# Patient Record
Sex: FEMALE | Race: BLACK OR AFRICAN AMERICAN | NOT HISPANIC OR LATINO | Employment: PART TIME | URBAN - METROPOLITAN AREA
[De-identification: names, ages, dates, MRNs, and addresses within clinical notes are randomized per-mention and may not be internally consistent; named-entity substitution may affect disease eponyms.]

---

## 2017-03-29 ENCOUNTER — OFFICE VISIT (OUTPATIENT)
Dept: MIDWIFE SERVICES | Facility: CLINIC | Age: 32
End: 2017-03-29
Payer: COMMERCIAL

## 2017-03-29 VITALS
BODY MASS INDEX: 27.83 KG/M2 | DIASTOLIC BLOOD PRESSURE: 70 MMHG | OXYGEN SATURATION: 100 % | HEART RATE: 65 BPM | TEMPERATURE: 97.8 F | SYSTOLIC BLOOD PRESSURE: 104 MMHG | HEIGHT: 66 IN | WEIGHT: 173.2 LBS

## 2017-03-29 DIAGNOSIS — Z30.432 ENCOUNTER FOR IUD REMOVAL: ICD-10-CM

## 2017-03-29 DIAGNOSIS — Z00.00 ROUTINE GENERAL MEDICAL EXAMINATION AT A HEALTH CARE FACILITY: Primary | ICD-10-CM

## 2017-03-29 DIAGNOSIS — Z31.69 ENCOUNTER FOR PRECONCEPTION CONSULTATION: ICD-10-CM

## 2017-03-29 PROCEDURE — 58301 REMOVE INTRAUTERINE DEVICE: CPT | Performed by: ADVANCED PRACTICE MIDWIFE

## 2017-03-29 PROCEDURE — 99385 PREV VISIT NEW AGE 18-39: CPT | Mod: 25 | Performed by: ADVANCED PRACTICE MIDWIFE

## 2017-03-29 NOTE — MR AVS SNAPSHOT
"              After Visit Summary   3/29/2017    Mark Cervantes    MRN: [id number]           Patient Information     Date Of Birth          1985        Visit Information        Provider Department      3/29/2017 10:45 AM Jazmin Valiente CNM [facility] [place]        Today's Diagnoses     Routine general medical examination at a health care facility    -  1    Encounter for IUD removal        Encounter for preconception consultation           Follow-ups after your visit        Follow-up notes from your care team     Return if symptoms worsen or fail to improve.      Who to contact     If you have questions or need follow up information about today's clinic visit or your schedule please contact [facility] directly at [phone].  Normal or non-critical lab and imaging results will be communicated to you by Space [employer]pehart, letter or phone within 4 business days after the clinic has received the results. If you do not hear from us within 7 days, please contact the clinic through [employer] or phone. If you have a critical or abnormal lab result, we will notify you by phone as soon as possible.  Submit refill requests through [employer] or call your pharmacy and they will forward the refill request to us. Please allow 3 business days for your refill to be completed.          Additional Information About Your Visit        MyChart Information     EndoEvolution lets you send messages to your doctor, view your test results, renew your prescriptions, schedule appointments and more. To sign up, go to [email or web address] . Click on \"Log in\" on the left side of the screen, which will take you to the Welcome page. Then click on \"Sign up Now\" on the right side of the page.     You will be asked to enter the access code listed below, as well as some personal information. Please follow the directions to create your username and password.     Your access code is: [email or web address]  Expires: 6/27/2017 11:33 AM   " "  Your access code will  in 90 days. If you need help or a new code, please call your [place] clinic or [phone].        Care EveryWhere ID     This is your Care EveryWhere ID. This could be used by other organizations to access your [place] medical records  [id number]        Your Vitals Were     Pulse Temperature Height Last Period Pulse Oximetry Breastfeeding?    65 97.8  F (36.6  C) (Oral) 5' 5.5\" (1.664 m) 2017 100% No    BMI (Body Mass Index)                   28.38 kg/m2            Blood Pressure from Last 3 Encounters:   17 104/70   09/27/15 106/66   07/14/15 120/77    Weight from Last 3 Encounters:   17 173 lb 3.2 oz (78.6 kg)   07/14/15 189 lb (85.7 kg)   14 167 lb 1.6 oz (75.8 kg)              We Performed the Following     REMOVE INTRAUTERINE DEVICE          Today's Medication Changes          These changes are accurate as of: 3/29/17 11:33 AM.  If you have any questions, ask your nurse or doctor.               Stop taking these medicines if you haven't already. Please contact your care team if you have questions.     ibuprofen 400 MG tablet   Commonly known as:  ADVIL/MOTRIN   Stopped by:  Jazmin Valiente CNM           PRENATAL 1 PO   Stopped by:  Jazmin Valiente CNM           VITAMIN D (CHOLECALCIFEROL) PO   Stopped by:  Jazmin Valiente CNM                    Primary Care Provider Office Phone # Fax #    Kathy Ely [phone]       [facility] [address] [facility] [address]  [facility] [address]        Thank you!     Thank you for choosing [facility]  for your care. Our goal is always to provide you with excellent care. Hearing back from our patients is one way we can continue to improve our services. Please take a few minutes to complete the written survey that you may receive in the mail after your visit with us. Thank you!             Your Updated Medication List - Protect others around you: Learn how to " safely use, store and throw away your medicines at www.disposemymeds.org.          This list is accurate as of: 3/29/17 11:33 AM.  Always use your most recent med list.                   Brand Name Dispense Instructions for use    SLOW  (45 FE) MG Tbcr   Generic drug:  ferrous sulfate      Take 142 mg by mouth daily Reported on 3/29/2017
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
## 2017-03-29 NOTE — Clinical Note
Pap smear done on this date: 9/2015 (approximately), by this group: [facility], results were NIL.

## 2017-12-07 ENCOUNTER — HOSPITAL ENCOUNTER (EMERGENCY)
Facility: CLINIC | Age: 32
Discharge: HOME | End: 2017-12-07
Attending: EMERGENCY MEDICINE | Admitting: EMERGENCY MEDICINE
Payer: COMMERCIAL

## 2017-12-07 ENCOUNTER — OFFICE VISIT (OUTPATIENT)
Dept: URGENT CARE | Facility: URGENT CARE | Age: 32
End: 2017-12-07
Payer: COMMERCIAL

## 2017-12-07 VITALS
RESPIRATION RATE: 18 BRPM | OXYGEN SATURATION: 98 % | HEIGHT: 66 IN | HEART RATE: 81 BPM | SYSTOLIC BLOOD PRESSURE: 105 MMHG | TEMPERATURE: 98.7 F | WEIGHT: 192 LBS | DIASTOLIC BLOOD PRESSURE: 68 MMHG | BODY MASS INDEX: 30.86 KG/M2

## 2017-12-07 VITALS
TEMPERATURE: 98.5 F | WEIGHT: 196 LBS | OXYGEN SATURATION: 98 % | HEART RATE: 94 BPM | BODY MASS INDEX: 32.12 KG/M2 | SYSTOLIC BLOOD PRESSURE: 108 MMHG | DIASTOLIC BLOOD PRESSURE: 65 MMHG | RESPIRATION RATE: 16 BRPM

## 2017-12-07 DIAGNOSIS — S05.02XA ABRASION OF LEFT CORNEA, INITIAL ENCOUNTER: ICD-10-CM

## 2017-12-07 DIAGNOSIS — H53.9 VISUAL DISTURBANCE: Primary | ICD-10-CM

## 2017-12-07 PROCEDURE — 99283 EMERGENCY DEPT VISIT LOW MDM: CPT

## 2017-12-07 PROCEDURE — 99214 OFFICE O/P EST MOD 30 MIN: CPT

## 2017-12-07 RX ORDER — PROPARACAINE HYDROCHLORIDE 5 MG/ML
1 SOLUTION/ DROPS OPHTHALMIC ONCE
Status: DISCONTINUED | OUTPATIENT
Start: 2017-12-07 | End: 2017-12-07 | Stop reason: HOSPADM

## 2017-12-07 RX ORDER — TOBRAMYCIN 3 MG/ML
2 SOLUTION/ DROPS OPHTHALMIC 4 TIMES DAILY
Qty: 3 ML | Refills: 0 | Status: SHIPPED | OUTPATIENT
Start: 2017-12-07 | End: 2017-12-14

## 2017-12-07 ASSESSMENT — ENCOUNTER SYMPTOMS
EYE PAIN: 1
PHOTOPHOBIA: 1
FEVER: 0

## 2017-12-07 ASSESSMENT — VISUAL ACUITY
OD: 20/50
OS: 20/70

## 2017-12-07 NOTE — ED AVS SNAPSHOT
Emergency Department    [address] [facility] [address]    Phone:  [phone]    Fax:  [phone]                                       Mark Cervantes   MRN: [id number]    Department:   Emergency Department   Date of Visit:  12/7/2017           Patient Information     Date Of Birth          1985        Your diagnoses for this visit were:     Abrasion of left cornea, initial encounter        You were seen by Sera Pemberton MD.      Follow-up Information     Follow up with Orquidea Varela MD.    Specialty:  Ophthalmology    Why:  As needed, If symptoms worsen    Contact information:    [facility]  [address] [facility]  [address]  [facility] [id number] [phone]          Follow up with  Emergency Department.    Specialty:  EMERGENCY MEDICINE    Why:  As needed, If symptoms worsen    Contact information:    [address] [facility] [id number] [phone]        Discharge Instructions         Contact Lens Injury    Your contact lens can cause injury to the cornea. The cornea is the clear part in the front of the eye. This injury can occur if you sleep with a hard or soft contact lens in place. Or it can happen if you wear a contact lens longer than advised. There is also a greater risk of injury if your eyes dry out too much while wearing a contact lens.  The cornea is very painful when injured, but it usually heals quickly. It usually improves within 24 to 48 hours. Your healthcare provider may apply an eye patch. This is to reduce pain and speed up the healing process. An antibiotic ointment or eye drops may also be used. Healing is complete when the pain stops and there are no other symptoms, such as eye redness, tearing or discharge, or blurred vision.  Home care    Don't wear contacts until you are pain free.    A cold pack may be applied over the eye for 20 minutes at a time to reduce pain. To make a cold pack, put ice cubes in a  plastic bag that seals at the top. Wrap the bag in a clean, thin towel or cloth.        Acetaminophen or ibuprofen can be used for pain, unless another medicine was prescribed. (Note: If you have chronic liver or kidney disease, or have ever had a stomach ulcer or gastrointestinal bleeding, talk with your healthcare provider before using these medicines.)    If an eye patch was applied:    Apply the cold pack directly over the eye patch as described above.    If you were given a follow-up appointment for patch removal and re-exam, don t miss it. An eye patch should not be left in place for more than 48 hours, unless you are advised to do so by your healthcare provider.    Don t drive a motor vehicle or operate machinery with the patch in place. You will have trouble judging distances with only one eye.  Follow-up care  Follow up with your healthcare provider, or as advised.  When to seek medical advice  Call your healthcare provider right away if any of these occur:    Increasing eye pain or pain that doesn't get better after 24 hours    Discharge from the eye    Increasing redness of the eye or swelling of the eyelids    Worsening vision  Date Last Reviewed: 7/1/2017 2000-2017 [employer]. [address] [place] [address]. All rights reserved. This information is not intended as a substitute for professional medical care. Always follow your healthcare professional's instructions.          24 Hour Appointment Hotline       To make an appointment at any [facility], call [phone] ([phone]). If you don't have a family doctor or clinic, we will help you find one. [facility] clinics are conveniently located to serve the needs of you and your family.             Review of your medicines      START taking        Dose / Directions Last dose taken    tobramycin 0.3 % ophthalmic solution   Commonly known as:  TOBREX   Dose:  2 drop   Quantity:  3 mL        Place 2 drops Into the left  eye 4 times daily for 7 days   Refills:  0          Our records show that you are taking the medicines listed below. If these are incorrect, please call your family doctor or clinic.        Dose / Directions Last dose taken    SLOW  (45 FE) MG Tbcr   Dose:  142 mg   Generic drug:  ferrous sulfate        Take 142 mg by mouth daily Reported on 3/29/2017   Refills:  0                Prescriptions were sent or printed at these locations (1 Prescription)                   Other Prescriptions                Printed at Department/Unit printer (1 of 1)         tobramycin (TOBREX) 0.3 % ophthalmic solution                Orders Needing Specimen Collection     None      Pending Results     No orders found from 12/5/2017 to 12/8/2017.            Pending Culture Results     No orders found from 12/5/2017 to 12/8/2017.            Pending Results Instructions     If you had any lab results that were not finalized at the time of your Discharge, you can call the ED Lab Result RN at [phone]. You will be contacted by this team for any positive Lab results or changes in treatment. The nurses are available 7 days a week from 10A to 6:30P.  You can leave a message 24 hours per day and they will return your call.        Test Results From Your Hospital Stay               Clinical Quality Measure: Blood Pressure Screening     Your blood pressure was checked while you were in the emergency department today. The last reading we obtained was  BP: 109/65 . Please read the guidelines below about what these numbers mean and what you should do about them.  If your systolic blood pressure (the top number) is less than 120 and your diastolic blood pressure (the bottom number) is less than 80, then your blood pressure is normal. There is nothing more that you need to do about it.  If your systolic blood pressure (the top number) is 120-139 or your diastolic blood pressure (the bottom number) is 80-89, your blood pressure may be higher  "than it should be. You should have your blood pressure rechecked within a year by a primary care provider.  If your systolic blood pressure (the top number) is 140 or greater or your diastolic blood pressure (the bottom number) is 90 or greater, you may have high blood pressure. High blood pressure is treatable, but if left untreated over time it can put you at risk for heart attack, stroke, or kidney failure. You should have your blood pressure rechecked by a primary care provider within the next 4 weeks.  If your provider in the emergency department today gave you specific instructions to follow-up with your doctor or provider even sooner than that, you should follow that instruction and not wait for up to 4 weeks for your follow-up visit.        Thank you for choosing [facility]       Thank you for choosing [facility] for your care. Our goal is always to provide you with excellent care. Hearing back from our patients is one way we can continue to improve our services. Please take a few minutes to complete the written survey that you may receive in the mail after you visit with us. Thank you!        Remedify Information     Rem[employer]ify lets you send messages to your doctor, view your test results, renew your prescriptions, schedule appointments and more. To sign up, go to [email or web address] . Click on \"Log in\" on the left side of the screen, which will take you to the Welcome page. Then click on \"Sign up Now\" on the right side of the page.     You will be asked to enter the access code listed below, as well as some personal information. Please follow the directions to create your username and password.     Your access code is: [email or web address]  Expires: 3/7/2018  8:10 PM     Your access code will  in 90 days. If you need help or a new code, please call your [facility] clinic or [phone].        Care EveryWhere ID     This is your Care EveryWhere ID. This could be used by other organizations to access your [facility] " medical records  [id number]        Equal Access to Services     PEDRITO CAST : Ignacio Maldonado, lakisha tierney, johnathan balubena. [facility] [phone].    ATENCIÓN: Si habla español, tiene a batista disposición servicios gratuitos de asistencia lingüística. Llame al [phone].    We comply with applicable federal civil rights laws and [place] laws. We do not discriminate on the basis of race, color, national origin, age, disability, sex, sexual orientation, or gender identity.            After Visit Summary       This is your record. Keep this with you and show to your community pharmacist(s) and doctor(s) at your next visit.

## 2017-12-07 NOTE — ED AVS SNAPSHOT
Emergency Department    [address] [place] [address]    Phone:  [phone]    Fax:  [phone]                                       Mark Cervantes   MRN: [id number]    Department:   Emergency Department   Date of Visit:  12/7/2017           After Visit Summary Signature Page     I have received my discharge instructions, and my questions have been answered. I have discussed any challenges I see with this plan with the nurse or doctor.    ..........................................................................................................................................  Patient/Patient Representative Signature      ..........................................................................................................................................  Patient Representative Print Name and Relationship to Patient    ..................................................               ................................................  Date                                            Time    ..........................................................................................................................................  Reviewed by Signature/Title    ...................................................              ..............................................  Date                                                            Time

## 2017-12-08 NOTE — ED PROVIDER NOTES
"  History     Chief Complaint:  Eye Pain    HPI   Mark Cervantes is a 32 year old female who presents with concerns for left eye pain. Today the patient awoke with left eye pain and removed her contact which was worn overnight. Throughout the day her eye has been hurting her and is worsened with movement and bright light. The patient had a prenatal appointment this morning and her eye pain made it difficult to drive. She attempted eye drops and resting though these were ineffective. She was seen at urgent care and referred here to the ED.     Allergies:  No Known Drug Allergies    Medications:    Slow Fe    Past Medical History:    Anemia  Diabetes mellitus  Tuberculosis    Past Surgical History:    Gyn surgery: ovarian cyst    Family History:    Eye surgery    Social History:  Smoking Status: never  Smokeless Tobacco: never  Alcohol Use: no    Marital Status:   [2]    Review of Systems   Constitutional: Negative for fever.   Eyes: Positive for photophobia, pain and visual disturbance.   All other systems reviewed and are negative.    Physical Exam   First Vitals:  BP: 109/65  Pulse: 80  Temp: 98.7  F (37.1  C)  Resp: 20  Height: 167.6 cm (5' 6\")  Weight: 87.1 kg (192 lb)  SpO2: 100 %  Visual Acuity-Left: 20/70  Visual Acuity-Right: 20/50      Physical Exam  General/Appearance: appears stated age, well-groomed, appears uncomfortable  Eyes: EOMI, mild L scleral injection, no icterus, PERRL, constant tearing from L eye, no periorbital edema/erythema/warmth, VA L 20/70 and R 20/50, not wearing contacts currently, 1mm increased fluoroscein uptake at 9 o'clock  ENT: MMM  Neck: supple, nl ROM, no stiffness  Skin: warm and well-perfused, no rash, no edema, no ecchymosis, nl turgor  Neuro: GCS 15, alert and oriented, no gross focal neuro deficits    Emergency Department Course   Interventions:  Fluorescein 1 mg  Emergency Department Course:  Nursing notes and vitals reviewed. I performed an exam of the patient as " documented above.     Medicine administered as documented above.     2035 I rechecked the patient and discussed the results of her workup thus far.     Findings and plan explained to the Patient. Patient discharged home with instructions regarding supportive care, medications, and reasons to return. The importance of close follow-up was reviewed. The patient was prescribed Tobrex.    I personally answered all related questions prior to discharge.     Impression & Plan    Medical Decision Making:  This pt presents for evaluation of eye pain. A broad differential diagnosis was considered including bacterial conjunctivitis, viral conjunctivitis, foreign body, corneal abrasion/ulcer, chemical vs allergic conjunctivitis, HSV, herpes zoster opthalmicus, endopthalmitis, (leila)orbital cellulitis, etc.  Exam is consistent with a corneal abrasion. Remainder of opthalmologic exam is neg for concerning findings such as focal swelling, erythema/warmth, pain with eye movement or light, visualized FB, amada sign, anterior chamber abnormalities. The patient was treated with antibiotics and is to follow-up with Optho for a 24 hour recheck if sxs worsen.  They were instructed to return for visual acuity changes, fever, orbital swelling or any other new and concerning sxs.  Patient is a contact lens wearer so  strict instructions were given to not wear contacts until cleared by optho.  She also is pregnant so Tobramycin gtt were chosen.    Diagnosis:    ICD-10-CM    1. Abrasion of left cornea, initial encounter S05.02XA        Disposition:  discharged to home    Discharge Medications:  Discharge Medication List as of 12/7/2017  8:10 PM      START taking these medications    Details   tobramycin (TOBREX) 0.3 % ophthalmic solution Place 2 drops Into the left eye 4 times daily for 7 days, Disp-3 mL, R-0, Local Print             I, Dangelo Delcid, delroy serving as a scribe on 12/7/2017 at 7:37 PM to personally document services performed by  Sera Pemberton* based on my observations and the provider's statements to me.       Dangelo Declid  12/7/2017    EMERGENCY DEPARTMENT       Sera Pemberton MD  12/07/17 [id number]

## 2017-12-08 NOTE — DISCHARGE INSTRUCTIONS
Contact Lens Injury    Your contact lens can cause injury to the cornea. The cornea is the clear part in the front of the eye. This injury can occur if you sleep with a hard or soft contact lens in place. Or it can happen if you wear a contact lens longer than advised. There is also a greater risk of injury if your eyes dry out too much while wearing a contact lens.  The cornea is very painful when injured, but it usually heals quickly. It usually improves within 24 to 48 hours. Your healthcare provider may apply an eye patch. This is to reduce pain and speed up the healing process. An antibiotic ointment or eye drops may also be used. Healing is complete when the pain stops and there are no other symptoms, such as eye redness, tearing or discharge, or blurred vision.  Home care    Don't wear contacts until you are pain free.    A cold pack may be applied over the eye for 20 minutes at a time to reduce pain. To make a cold pack, put ice cubes in a plastic bag that seals at the top. Wrap the bag in a clean, thin towel or cloth.        Acetaminophen or ibuprofen can be used for pain, unless another medicine was prescribed. (Note: If you have chronic liver or kidney disease, or have ever had a stomach ulcer or gastrointestinal bleeding, talk with your healthcare provider before using these medicines.)    If an eye patch was applied:    Apply the cold pack directly over the eye patch as described above.    If you were given a follow-up appointment for patch removal and re-exam, don t miss it. An eye patch should not be left in place for more than 48 hours, unless you are advised to do so by your healthcare provider.    Don t drive a motor vehicle or operate machinery with the patch in place. You will have trouble judging distances with only one eye.  Follow-up care  Follow up with your healthcare provider, or as advised.  When to seek medical advice  Call your healthcare provider right away if any of these  occur:    Increasing eye pain or pain that doesn't get better after 24 hours    Discharge from the eye    Increasing redness of the eye or swelling of the eyelids    Worsening vision  Date Last Reviewed: 7/1/2017 2000-2017 [employer] [address] [place] [address], [place] [address]. All rights reserved. This information is not intended as a substitute for professional medical care. Always follow your healthcare professional's instructions.

## 2017-12-22 DIAGNOSIS — D50.8 OTHER IRON DEFICIENCY ANEMIA: Primary | ICD-10-CM

## 2017-12-22 PROBLEM — Z3A.28 28 WEEKS GESTATION OF PREGNANCY: Status: ACTIVE | Noted: 2017-12-22

## 2017-12-25 NOTE — PROGRESS NOTES
[facility] Urgent Care Progress Note        Zaira Roche MD, MPH  12-7-2017        History:      Mark Cervantes is a pleasant 32 year old year old female with Irritation and redness, pain and blurry vision of the left eye without whitish/yellowish Drainage. No change in visual accuity. No fever or illness. No cough or shortness of breath. No headache or neck pain. No trauma to the eye. She reports photophobia . No headache is referred. No ocular trauma is referred. No nasal drainage. She wears contact lenses.         Assessment and Plan:      Left ocular pain and visual disturbance:  The patient is directed to [facility] ER for further evaluation. The patient expresses understanding and agrees w this plan.                   Physical Exam:      /65  Pulse 94  Temp 98.5  F (36.9  C)  Resp 16  Wt 196 lb (88.9 kg)  LMP 05/30/2016  SpO2 98%  BMI 32.12 kg/m2     Constitutional: Patient is in no distress The patient is pleasant and cooperative.   HEENT: Head:  Head is atraumatic, normocephalic.    Eyes: Pupils are equal, round and reactive to light and accomodation.  Sclera is non-icteric. No conjunctival injection, or exudate noted. Extraocular motion is intact. Visual acuity is intact bilaterally. Left eye is sensitive to light.  Ears:  External acoustic canals are patent and clear.  There is no erythema and bulging( exudate)  of the ( R/L ) tympanic membrane(s ).   Nose:  No Nasal congestion w/o drainage or mucosal ulceration is noted.  Throat:  Oral mucosa is moist.  No oral lesions are noted.  No posterior pharyngeal hyperemia nor exudate noted.     Neck Supple.  There is no cervical lymphadenopathy.  No nuchal rigidity noted.  There is no meningismus.     Cardiovascular: Heart is regular to rate and rhythm.  No murmur is noted.     Lungs: Clear in the anterior and posterior pulmonary fields.   Abdomen: Soft and non-tender.    Back No flank tenderness is noted.   Extremeties No edema, no calf  tenderness.   Neuro: No focal deficit.   Skin No petechiae or purpura is noted.  There is no rash.   Mood Normal              Data:      All new lab and imaging data was reviewed.   Results for orders placed or performed during the hospital encounter of 09/24/15   CBC with platelets differential   Result Value Ref Range    WBC 15.1 (H) 4.0 - 11.0 10e9/L    RBC Count 4.40 3.8 - 5.2 10e12/L    Hemoglobin 11.7 11.7 - 15.7 g/dL    Hematocrit 35.0 35.0 - 47.0 %    MCV 80 78 - 100 fl    MCH 26.6 26.5 - 33.0 pg    MCHC 33.4 31.5 - 36.5 g/dL    RDW 16.4 (H) 10.0 - 15.0 %    Platelet Count 184 150 - 450 10e9/L    Diff Method Automated Method     % Neutrophils 76.8 %    % Lymphocytes 11.7 %    % Monocytes 10.8 %    % Eosinophils 0.1 %    % Basophils 0.1 %    % Immature Granulocytes 0.5 %    Absolute Neutrophil 11.6 (H) 1.6 - 8.3 10e9/L    Absolute Lymphocytes 1.8 0.8 - 5.3 10e9/L    Absolute Monocytes 1.6 (H) 0.0 - 1.3 10e9/L    Absolute Eosinophils 0.0 0.0 - 0.7 10e9/L    Absolute Basophils 0.0 0.0 - 0.2 10e9/L    Abs Immature Granulocytes 0.1 0 - 0.4 10e9/L   ABO/Rh type and screen   Result Value Ref Range    ABO O     RH(D)  Pos     Antibody Screen Neg     Test Valid Only At [facility]     Specimen Expires 09/28/2015    Group B strep PCR   Result Value Ref Range    Group B Strep PCR positive

## 2018-01-03 ENCOUNTER — INFUSION THERAPY VISIT (OUTPATIENT)
Dept: INFUSION THERAPY | Facility: CLINIC | Age: 33
End: 2018-01-03
Attending: OBSTETRICS & GYNECOLOGY
Payer: COMMERCIAL

## 2018-01-03 VITALS
OXYGEN SATURATION: 96 % | DIASTOLIC BLOOD PRESSURE: 66 MMHG | RESPIRATION RATE: 16 BRPM | SYSTOLIC BLOOD PRESSURE: 112 MMHG | TEMPERATURE: 98.4 F | HEART RATE: 82 BPM

## 2018-01-03 DIAGNOSIS — Z3A.28 28 WEEKS GESTATION OF PREGNANCY: ICD-10-CM

## 2018-01-03 DIAGNOSIS — D50.8 OTHER IRON DEFICIENCY ANEMIA: Primary | ICD-10-CM

## 2018-01-03 PROCEDURE — 25000128 H RX IP 250 OP 636: Performed by: OBSTETRICS & GYNECOLOGY

## 2018-01-03 PROCEDURE — 96365 THER/PROPH/DIAG IV INF INIT: CPT

## 2018-01-03 PROCEDURE — 96366 THER/PROPH/DIAG IV INF ADDON: CPT

## 2018-01-03 RX ADMIN — IRON SUCROSE 300 MG: 20 INJECTION, SOLUTION INTRAVENOUS at 11:29

## 2018-01-03 ASSESSMENT — PAIN SCALES - GENERAL: PAINLEVEL: NO PAIN (0)

## 2018-01-03 NOTE — PROGRESS NOTES
Infusion Nursing Note:  Mark Cervantes presents today for Venofer.    Patient seen by provider today: No   present during visit today: Not Applicable.    Note: N/A.    Intravenous Access:  Peripheral IV placed.    Treatment Conditions:  Not Applicable.      Post Infusion Assessment:  Patient tolerated infusion without incident.  Blood return noted pre and post infusion.  Site patent and intact, free from redness, edema or discomfort.  No evidence of extravasations.  Access discontinued per protocol.    Discharge Plan:   Patient declined prescription refills.  Discharge instructions reviewed with: Patient.  Patient verbalized understanding of discharge instructions and all questions answered.  Copy of AVS reviewed with patient.  Patient will return 1/8/18 for next appointment.  Patient discharged in stable condition accompanied by: self.  Departure Mode: Ambulatory.    Rola Lezama RN

## 2018-01-03 NOTE — MR AVS SNAPSHOT
"              After Visit Summary   1/3/2018    Mark Cervantes    MRN: [id number]           Patient Information     Date Of Birth          1985        Visit Information        Provider Department      1/3/2018 10:30 AM  INFUSION CHAIR 17 [facility]        Today's Diagnoses     Other iron deficiency anemia    -  1    28 weeks gestation of pregnancy           Follow-ups after your visit        Your next 10 appointments already scheduled     Jan 08, 2018 10:30 AM CST   Level 2 with  INFUSION CHAIR 19   [facility] ([facility])    [facility]  [address]  [facility] [id number] [phone]              Who to contact     If you have questions or need follow up information about today's clinic visit or your schedule please contact [facility] directly at [phone].  Normal or non-critical lab and imaging results will be communicated to you by [employer], letter or phone within 4 business days after the clinic has received the results. If you do not hear from us within 7 days, please contact the clinic through [employer] or phone. If you have a critical or abnormal lab result, we will notify you by phone as soon as possible.  Submit refill requests through [employer] or call your pharmacy and they will forward the refill request to us. Please allow 3 business days for your refill to be completed.          Additional Information About Your Visit        MyChart Information     [employer] lets you send messages to your doctor, view your test results, renew your prescriptions, schedule appointments and more. To sign up, go to [email or web address] . Click on \"Log in\" on the left side of the screen, which will take you to the Welcome page. Then click on \"Sign up Now\" on the right side of the page.     You will be asked to enter the access code listed below, as well as some " personal information. Please follow the directions to create your username and password.     Your access code is: [email or web address]  Expires: 3/7/2018  8:10 PM     Your access code will  in 90 days. If you need help or a new code, please call your [facility] or [phone].        Care EveryWhere ID     This is your Care EveryWhere ID. This could be used by other organizations to access your [place] medical records  [id number]        Your Vitals Were     Pulse Temperature Respirations Last Period Pulse Oximetry       82 98.4  F (36.9  C) (Oral) 16 2016 96%        Blood Pressure from Last 3 Encounters:   18 112/66   17 105/68   17 108/65    Weight from Last 3 Encounters:   17 87.1 kg (192 lb)   17 88.9 kg (196 lb)   17 78.6 kg (173 lb 3.2 oz)              Today, you had the following     No orders found for display       Primary Care Provider Office Phone # Fax #    Kathy Ely [phone]       [facility] [address]  [place] [address]        Equal Access to Services     MAY [facility] HospitalALESSANDRA : Hadii aad ku hadasho Soomaali, waaxda luqadaha, qaybta kaalmada adeegyada, johnathan white . So [facility] [phone].    ATENCIÓN: Si habla español, tiene a batista disposición servicios gratuitos de asistencia lingüística. Llame al [phone].    We comply with applicable federal civil rights laws and [place] laws. We do not discriminate on the basis of race, color, national origin, age, disability, sex, sexual orientation, or gender identity.            Thank you!     Thank you for choosing [facility]  for your care. Our goal is always to provide you with excellent care. Hearing back from our patients is one way we can continue to improve our services. Please take a few minutes to complete the written survey that you may receive in the mail after your visit with us. Thank you!              Your Updated Medication List - Protect others around you: Learn how to safely use, store and throw away your medicines at www.disposemymeds.org.          This list is accurate as of: 1/3/18  1:16 PM.  Always use your most recent med list.                   Brand Name Dispense Instructions for use Diagnosis    SLOW  (45 FE) MG Tbcr   Generic drug:  ferrous sulfate      Take 142 mg by mouth daily Reported on 3/29/2017

## 2018-01-08 ENCOUNTER — INFUSION THERAPY VISIT (OUTPATIENT)
Dept: INFUSION THERAPY | Facility: CLINIC | Age: 33
End: 2018-01-08
Attending: OBSTETRICS & GYNECOLOGY
Payer: COMMERCIAL

## 2018-01-08 VITALS
RESPIRATION RATE: 16 BRPM | DIASTOLIC BLOOD PRESSURE: 62 MMHG | SYSTOLIC BLOOD PRESSURE: 105 MMHG | HEART RATE: 80 BPM | TEMPERATURE: 98.6 F

## 2018-01-08 DIAGNOSIS — D50.8 OTHER IRON DEFICIENCY ANEMIA: Primary | ICD-10-CM

## 2018-01-08 DIAGNOSIS — Z3A.28 28 WEEKS GESTATION OF PREGNANCY: ICD-10-CM

## 2018-01-08 PROCEDURE — 96365 THER/PROPH/DIAG IV INF INIT: CPT

## 2018-01-08 PROCEDURE — 96366 THER/PROPH/DIAG IV INF ADDON: CPT

## 2018-01-08 PROCEDURE — 25000128 H RX IP 250 OP 636: Performed by: OBSTETRICS & GYNECOLOGY

## 2018-01-08 RX ADMIN — IRON SUCROSE 300 MG: 20 INJECTION, SOLUTION INTRAVENOUS at 12:58

## 2018-01-08 RX ADMIN — SODIUM CHLORIDE 250 ML: 9 INJECTION, SOLUTION INTRAVENOUS at 12:58

## 2018-01-08 ASSESSMENT — PAIN SCALES - GENERAL: PAINLEVEL: NO PAIN (0)

## 2018-01-08 NOTE — PROGRESS NOTES
Infusion Nursing Note:  Mark Cervantes presents today for Venofer.    Patient seen by provider today: Yes: Dr. Navarro   present during visit today: Not Applicable.    Note: No concerns or changes to report. 33 weeks pregnant..    Intravenous Access:  Peripheral IV placed.    Treatment Conditions:  Not Applicable.      Post Infusion Assessment:  Patient tolerated infusion without incident.  Blood return noted pre and post infusion.  Site patent and intact, free from redness, edema or discomfort.  No evidence of extravasations.  Access discontinued per protocol.    Discharge Plan:   Discharge instructions reviewed with: Patient.  Patient and/or family verbalized understanding of discharge instructions and all questions answered.  Copy of AVS reviewed with patient and/or family.  Patient will return: None needed  Patient discharged in stable condition accompanied by: self.  Departure Mode: Ambulatory.    Alaina Whitmore RN

## 2018-01-08 NOTE — MR AVS SNAPSHOT
"              After Visit Summary   2018    Mark Cervantes    MRN: [id number]           Patient Information     Date Of Birth          1985        Visit Information        Provider Department      2018 10:30 AM  INFUSION CHAIR 19 [facility]        Today's Diagnoses     Other iron deficiency anemia    -  1    28 weeks gestation of pregnancy           Follow-ups after your visit        Who to contact     If you have questions or need follow up information about today's clinic visit or your schedule please contact [facility] directly at [phone].  Normal or non-critical lab and imaging results will be communicated to you by [employer], letter or phone within 4 business days after the clinic has received the results. If you do not hear from us within 7 days, please contact the clinic through [employer] or phone. If you have a critical or abnormal lab result, we will notify you by phone as soon as possible.  Submit refill requests through [employer] or call your pharmacy and they will forward the refill request to us. Please allow 3 business days for your refill to be completed.          Additional Information About Your Visit        MyChart Information     [employer] lets you send messages to your doctor, view your test results, renew your prescriptions, schedule appointments and more. To sign up, go to [email or web address] . Click on \"Log in\" on the left side of the screen, which will take you to the Welcome page. Then click on \"Sign up Now\" on the right side of the page.     You will be asked to enter the access code listed below, as well as some personal information. Please follow the directions to create your username and password.     Your access code is: [email or web address]  Expires: 3/7/2018  8:10 PM     Your access code will  in 90 days. If you need help or a new code, please call your [place] clinic or [phone].        Care " EveryWhere ID     This is your Care EveryWhere ID. This could be used by other organizations to access your [place] medical records  [id number]        Your Vitals Were     Pulse Temperature Respirations Last Period          80 98.6  F (37  C) (Oral) 16 05/30/2016         Blood Pressure from Last 3 Encounters:   01/08/18 105/62   01/03/18 112/66   12/07/17 105/68    Weight from Last 3 Encounters:   12/07/17 87.1 kg (192 lb)   12/07/17 88.9 kg (196 lb)   03/29/17 78.6 kg (173 lb 3.2 oz)              Today, you had the following     No orders found for display         Today's Medication Changes          These changes are accurate as of: 1/8/18  2:37 PM.  If you have any questions, ask your nurse or doctor.               Stop taking these medicines if you haven't already. Please contact your care team if you have questions.     SLOW  (45 FE) MG Tbcr   Generic drug:  ferrous sulfate                    Primary Care Provider Office Phone # Fax #    Kathy Ely [phone]       [facility] [address]  [place] [address]        Equal Access to Services     MAY CAST AH: Hadii latricia Maldonado, waaxda niall, qaybta kaalmada kenya, johnathan winchester. So [facility] [phone].    ATENCIÓN: Si habla español, tiene a batista disposición servicios gratuitos de asistencia lingüística. Ll[facility]ville Hospital [phone].    We comply with applicable federal civil rights laws and [place] laws. We do not discriminate on the basis of race, color, national origin, age, disability, sex, sexual orientation, or gender identity.            Thank you!     Thank you for choosing [facility]  for your care. Our goal is always to provide you with excellent care. Hearing back from our patients is one way we can continue to improve our services. Please take a few minutes to complete the written survey that you may receive in the mail after your  visit with us. Thank you!             Your Updated Medication List - Protect others around you: Learn how to safely use, store and throw away your medicines at www.disposemymeds.org.          This list is accurate as of: 1/8/18  2:37 PM.  Always use your most recent med list.                   Brand Name Dispense Instructions for use Diagnosis    ONE-A-DAY WOMENS PRENATAL 1 PO      Take by mouth daily        VITRON-C PO      Take by mouth daily

## 2018-01-26 ENCOUNTER — TELEPHONE (OUTPATIENT)
Dept: INFUSION THERAPY | Facility: CLINIC | Age: 33
End: 2018-01-26

## 2018-01-30 DIAGNOSIS — D50.8 OTHER IRON DEFICIENCY ANEMIA: Primary | ICD-10-CM

## 2018-01-31 ENCOUNTER — INFUSION THERAPY VISIT (OUTPATIENT)
Dept: INFUSION THERAPY | Facility: CLINIC | Age: 33
End: 2018-01-31
Attending: OBSTETRICS & GYNECOLOGY
Payer: COMMERCIAL

## 2018-01-31 VITALS
DIASTOLIC BLOOD PRESSURE: 66 MMHG | OXYGEN SATURATION: 99 % | RESPIRATION RATE: 16 BRPM | SYSTOLIC BLOOD PRESSURE: 110 MMHG | HEART RATE: 75 BPM | TEMPERATURE: 98.1 F

## 2018-01-31 DIAGNOSIS — Z3A.28 28 WEEKS GESTATION OF PREGNANCY: ICD-10-CM

## 2018-01-31 DIAGNOSIS — D50.8 OTHER IRON DEFICIENCY ANEMIA: Primary | ICD-10-CM

## 2018-01-31 PROCEDURE — 96366 THER/PROPH/DIAG IV INF ADDON: CPT

## 2018-01-31 PROCEDURE — 25000128 H RX IP 250 OP 636: Performed by: OBSTETRICS & GYNECOLOGY

## 2018-01-31 PROCEDURE — 96365 THER/PROPH/DIAG IV INF INIT: CPT

## 2018-01-31 RX ADMIN — IRON SUCROSE 300 MG: 20 INJECTION, SOLUTION INTRAVENOUS at 10:56

## 2018-01-31 ASSESSMENT — PAIN SCALES - GENERAL: PAINLEVEL: NO PAIN (0)

## 2018-01-31 NOTE — PROGRESS NOTES
Infusion Nursing Note:  Mark Cervantes presents today for Venofer.    Patient seen by provider today: No   present during visit today: Not Applicable.    Note: 37 weeks pregnant. No concerns to report..    Intravenous Access:  Peripheral IV placed.    Treatment Conditions:  Not Applicable.      Post Infusion Assessment:  Patient tolerated infusion without incident.  Blood return noted pre and post infusion.  Site patent and intact, free from redness, edema or discomfort.  No evidence of extravasations.  Access discontinued per protocol.    Discharge Plan:   Discharge instructions reviewed with: Patient.  Patient and/or family verbalized understanding of discharge instructions and all questions answered.  Copy of AVS reviewed with patient and/or family.  Patient will return only as needed for next appointment.  Patient discharged in stable condition accompanied by: self.  Departure Mode: Ambulatory.    Alaina Whitmore RN

## 2018-01-31 NOTE — MR AVS SNAPSHOT
"              After Visit Summary   2018    Mark Cervantes    MRN: [id number]           Patient Information     Date Of Birth          1985        Visit Information        Provider Department      2018 10:30 AM  INFUSION CHAIR [facility]        Today's Diagnoses     Other iron deficiency anemia    -  1    28 weeks gestation of pregnancy           Follow-ups after your visit        Who to contact     If you have questions or need follow up information about today's clinic visit or your schedule please contact [facility] directly at [phone].  Normal or non-critical lab and imaging results will be communicated to you by Colabohart, letter or phone within 4 business days after the clinic has received the results. If you do not hear from us within 7 days, please contact the clinic through NantMobilet or phone. If you have a critical or abnormal lab result, we will notify you by phone as soon as possible.  Submit refill requests through [employer] or call your pharmacy and they will forward the refill request to us. Please allow 3 business days for your refill to be completed.          Additional Information About Your Visit        MyChart Information     [employer] lets you send messages to your doctor, view your test results, renew your prescriptions, schedule appointments and more. To sign up, go to [email or web address] . Click on \"Log in\" on the left side of the screen, which will take you to the Welcome page. Then click on \"Sign up Now\" on the right side of the page.     You will be asked to enter the access code listed below, as well as some personal information. Please follow the directions to create your username and password.     Your access code is: [email or web address]  Expires: 3/7/2018  8:10 PM     Your access code will  in 90 days. If you need help or a new code, please call your [facility] clinic or [phone].        Care " EveryWhere ID     This is your Care EveryWhere ID. This could be used by other organizations to access your [facility] medical records  [id number]        Your Vitals Were     Pulse Temperature Respirations Last Period Pulse Oximetry       75 98.1  F (36.7  C) 16 03/27/2017 99%        Blood Pressure from Last 3 Encounters:   01/31/18 110/66   01/08/18 105/62   01/03/18 112/66    Weight from Last 3 Encounters:   12/07/17 87.1 kg (192 lb)   12/07/17 88.9 kg (196 lb)   03/29/17 78.6 kg (173 lb 3.2 oz)              Today, you had the following     No orders found for display       Primary Care Provider Office Phone # Fax #    Kathy Ely [phone]       [facility] [address]  [place] [address]        Equal Access to Services     [facility]: Hadii aad ku hadasho Solauriali, waaxda luqadaha, qaybta kaalmada adeegyada, waxay edmundoin haybashirn salvatore white . So [facility] [phone].    ATENCIÓN: Si habla español, tiene a batista disposición servicios gratuitos de asistencia lingüística. Eric al [phone].    We comply with applicable federal civil rights laws and [place] laws. We do not discriminate on the basis of race, color, national origin, age, disability, sex, sexual orientation, or gender identity.            Thank you!     Thank you for choosing [facility]  for your care. Our goal is always to provide you with excellent care. Hearing back from our patients is one way we can continue to improve our services. Please take a few minutes to complete the written survey that you may receive in the mail after your visit with us. Thank you!             Your Updated Medication List - Protect others around you: Learn how to safely use, store and throw away your medicines at www.disposemymeds.org.          This list is accurate as of 1/31/18 12:30 PM.  Always use your most recent med list.                   Brand Name Dispense Instructions for use  Diagnosis    ONE-A-DAY WOMENS PRENATAL 1 PO      Take by mouth daily        VITRON-C PO      Take by mouth daily

## 2018-02-01 LAB — GROUP B STREP PCR: POSITIVE

## 2018-03-04 ENCOUNTER — HOSPITAL ENCOUNTER (OUTPATIENT)
Facility: CLINIC | Age: 33
Discharge: HOME | End: 2018-03-04
Attending: OBSTETRICS & GYNECOLOGY | Admitting: OBSTETRICS & GYNECOLOGY
Payer: COMMERCIAL

## 2018-03-04 VITALS — TEMPERATURE: 98.4 F | SYSTOLIC BLOOD PRESSURE: 134 MMHG | DIASTOLIC BLOOD PRESSURE: 85 MMHG

## 2018-03-04 PROCEDURE — 59025 FETAL NON-STRESS TEST: CPT

## 2018-03-04 RX ORDER — ONDANSETRON 2 MG/ML
4 INJECTION INTRAMUSCULAR; INTRAVENOUS EVERY 6 HOURS PRN
Status: DISCONTINUED | OUTPATIENT
Start: 2018-03-04 | End: 2018-03-04 | Stop reason: HOSPADM

## 2018-03-04 NOTE — IP AVS SNAPSHOT
[address], [address]    [facility] [address]    Phone:  [phone]                                       After Visit Summary   3/4/2018    Mark Cervantes    MRN: [id number]           After Visit Summary Signature Page     I have received my discharge instructions, and my questions have been answered. I have discussed any challenges I see with this plan with the nurse or doctor.    ..........................................................................................................................................  Patient/Patient Representative Signature      ..........................................................................................................................................  Patient Representative Print Name and Relationship to Patient    ..................................................               ................................................  Date                                            Time    ..........................................................................................................................................  Reviewed by Signature/Title    ...................................................              ..............................................  Date                                                            Time

## 2018-03-04 NOTE — IP AVS SNAPSHOT
MRN:[id number]                      After Visit Summary   3/4/2018    Mark Cervantes    MRN: [id number]           Thank you!     Thank you for choosing [facility] for your care. Our goal is always to provide you with excellent care. Hearing back from our patients is one way we can continue to improve our services. Please take a few minutes to complete the written survey that you may receive in the mail after you visit with us. Thank you!        Patient Information     Date Of Birth          1985        About your hospital stay     You were admitted on:  2018 You last received care in the:  [facility]    You were discharged on:  2018       Who to Call     For medical emergencies, please call 911.  For non-urgent questions about your medical care, please call your primary care provider or clinic, [phone]          Attending Provider     Provider Specialty    Jie Navarro MD OB/Gyn       Primary Care Provider Office Phone # Fax #    Jie Navarro [phone]      Further instructions from your care team       Data: [facility]   Reason for maternal/fetal assessment per patient is favorable cervix for induction. Patient is a . Prenatal record reviewed.      Gestational Age 41w0d. VSS. Cervix: posterior.  Fetal movement present. Patient denies cramping, backache, vaginal discharge, pelvic pressure, UTI symptoms, GI problems, bloody show, vaginal bleeding, edema, headache, visual disturbances, epigastric or URQ pain, abdominal pain, rupture of membranes. Support persons  present.  Action: Verbal consent for EFM. Triage assessment completed. EFM applied for reactive NST. Uterine assessment no ctx. Fetal assessment: Presumed adequate fetal oxygenation documented (see flow record). Patient education pamphlets given on fetal movement counts . Patient instructed to report change in fetal movement, vaginal leaking  "of fluid or bleeding, abdominal pain, or any concerns related to the pregnancy to her nurse/physician.   Response: Dr. Valenzuela informed of SVE. Plan per provider is return in AM for scheduled induction with Dr Sullivan. Patient verbalized understanding of education and verbalized agreement with plan. Discharged ambulatory at 1910.    Face to face time: 0-15 minutes      Pending Results     No orders found from 3/2/2018 to 3/5/2018.            Admission Information     Date & Time Provider Department Dept. Phone    3/4/2018 Jie Navarro MD [facility] [phone]      Your Vitals Were     Blood Pressure Temperature Last Period             134/85 98.4  F (36.9  C) 2017         [facility] Information     [employer] lets you send messages to your doctor, view your test results, renew your prescriptions, schedule appointments and more. To sign up, go to [email or web address] . Click on \"Log in\" on the left side of the screen, which will take you to the Welcome page. Then click on \"Sign up Now\" on the right side of the page.     You will be asked to enter the access code listed below, as well as some personal information. Please follow the directions to create your username and password.     Your access code is: [email or web address]  Expires: 3/7/2018  8:10 PM     Your access code will  in 90 days. If you need help or a new code, please call your [facility] clinic or [phone].        Care EveryWhere ID     This is your Care EveryWhere ID. This could be used by other organizations to access your [facility] medical records  [id number]        Equal Access to Services     [facility]: Hadii latricia smith Sodeborah, waaxda luqadaha, qaybta kaaljohnathan shultz. So [facility] [phone].    ATENCIÓN: Si habla español, tiene a batista disposición servicios gratuitos de asistencia lingüística. Llame al [phone].    We comply with applicable federal civil rights laws and [place] " laws. We do not discriminate on the basis of race, color, national origin, age, disability, sex, sexual orientation, or gender identity.               Review of your medicines      UNREVIEWED medicines. Ask your doctor about these medicines        Dose / Directions    ONE-A-DAY WOMENS PRENATAL 1 PO        Take by mouth daily   Refills:  0       VITRON-C PO        Take by mouth daily   Refills:  0                Protect others around you: Learn how to safely use, store and throw away your medicines at www.disposemymeds.org.             Medication List: This is a list of all your medications and when to take them. Check marks below indicate your daily home schedule. Keep this list as a reference.      Medications           Morning Afternoon Evening Bedtime As Needed    ONE-A-DAY WOMENS PRENATAL 1 PO   Take by mouth daily                                VITRON-C PO   Take by mouth daily

## 2018-03-05 ENCOUNTER — ANESTHESIA EVENT (OUTPATIENT)
Dept: OBGYN | Facility: CLINIC | Age: 33
End: 2018-03-05
Payer: COMMERCIAL

## 2018-03-05 ENCOUNTER — HOSPITAL ENCOUNTER (INPATIENT)
Facility: CLINIC | Age: 33
LOS: 3 days | Discharge: HOME HEALTH | End: 2018-03-08
Attending: OBSTETRICS & GYNECOLOGY | Admitting: OBSTETRICS & GYNECOLOGY
Payer: COMMERCIAL

## 2018-03-05 ENCOUNTER — ANESTHESIA (OUTPATIENT)
Dept: OBGYN | Facility: CLINIC | Age: 33
End: 2018-03-05
Payer: COMMERCIAL

## 2018-03-05 PROBLEM — Z34.90 PREGNANCY: Status: ACTIVE | Noted: 2018-03-05

## 2018-03-05 LAB
ABO + RH BLD: NORMAL
ABO + RH BLD: NORMAL
ALT SERPL W P-5'-P-CCNC: 15 U/L (ref 0–50)
AST SERPL W P-5'-P-CCNC: 20 U/L (ref 0–45)
BLD GP AB SCN SERPL QL: NORMAL
BLOOD BANK CMNT PATIENT-IMP: NORMAL
BUN SERPL-MCNC: 9 MG/DL (ref 7–30)
CREAT SERPL-MCNC: 0.51 MG/DL (ref 0.52–1.04)
ERYTHROCYTE [DISTWIDTH] IN BLOOD BY AUTOMATED COUNT: 19.9 % (ref 10–15)
GFR SERPL CREATININE-BSD FRML MDRD: >90 ML/MIN/1.7M2
GLUCOSE BLDC GLUCOMTR-MCNC: 118 MG/DL (ref 70–99)
GLUCOSE BLDC GLUCOMTR-MCNC: 75 MG/DL (ref 70–99)
GLUCOSE BLDC GLUCOMTR-MCNC: 77 MG/DL (ref 70–99)
GLUCOSE BLDC GLUCOMTR-MCNC: 90 MG/DL (ref 70–99)
GLUCOSE SERPL-MCNC: 90 MG/DL (ref 70–99)
HCT VFR BLD AUTO: 35 % (ref 35–47)
HGB BLD-MCNC: 11.4 G/DL (ref 11.7–15.7)
HGB BLD-MCNC: NORMAL G/DL (ref 11.7–15.7)
MCH RBC QN AUTO: 25.6 PG (ref 26.5–33)
MCHC RBC AUTO-ENTMCNC: 32.6 G/DL (ref 31.5–36.5)
MCV RBC AUTO: 79 FL (ref 78–100)
PLATELET # BLD AUTO: 172 10E9/L (ref 150–450)
RBC # BLD AUTO: 4.46 10E12/L (ref 3.8–5.2)
SPECIMEN EXP DATE BLD: NORMAL
T PALLIDUM IGG+IGM SER QL: NEGATIVE
URATE SERPL-MCNC: 5.9 MG/DL (ref 2.6–6)
WBC # BLD AUTO: 8.6 10E9/L (ref 4–11)

## 2018-03-05 PROCEDURE — 82565 ASSAY OF CREATININE: CPT | Performed by: OBSTETRICS & GYNECOLOGY

## 2018-03-05 PROCEDURE — 86850 RBC ANTIBODY SCREEN: CPT | Performed by: OBSTETRICS & GYNECOLOGY

## 2018-03-05 PROCEDURE — 27110038 ZZH RX 271: Performed by: ANESTHESIOLOGY

## 2018-03-05 PROCEDURE — 10907ZC DRAINAGE OF AMNIOTIC FLUID, THERAPEUTIC FROM PRODUCTS OF CONCEPTION, VIA NATURAL OR ARTIFICIAL OPENING: ICD-10-PCS | Performed by: OBSTETRICS & GYNECOLOGY

## 2018-03-05 PROCEDURE — 3E033VJ INTRODUCTION OF OTHER HORMONE INTO PERIPHERAL VEIN, PERCUTANEOUS APPROACH: ICD-10-PCS | Performed by: OBSTETRICS & GYNECOLOGY

## 2018-03-05 PROCEDURE — 72200001 ZZH LABOR CARE VAGINAL DELIVERY SINGLE

## 2018-03-05 PROCEDURE — 84520 ASSAY OF UREA NITROGEN: CPT | Performed by: OBSTETRICS & GYNECOLOGY

## 2018-03-05 PROCEDURE — 25000132 ZZH RX MED GY IP 250 OP 250 PS 637: Performed by: OBSTETRICS & GYNECOLOGY

## 2018-03-05 PROCEDURE — 85027 COMPLETE CBC AUTOMATED: CPT | Performed by: OBSTETRICS & GYNECOLOGY

## 2018-03-05 PROCEDURE — 25000125 ZZHC RX 250: Performed by: OBSTETRICS & GYNECOLOGY

## 2018-03-05 PROCEDURE — 25000125 ZZHC RX 250: Performed by: ANESTHESIOLOGY

## 2018-03-05 PROCEDURE — 86780 TREPONEMA PALLIDUM: CPT | Performed by: OBSTETRICS & GYNECOLOGY

## 2018-03-05 PROCEDURE — 36415 COLL VENOUS BLD VENIPUNCTURE: CPT | Performed by: OBSTETRICS & GYNECOLOGY

## 2018-03-05 PROCEDURE — 84450 TRANSFERASE (AST) (SGOT): CPT | Performed by: OBSTETRICS & GYNECOLOGY

## 2018-03-05 PROCEDURE — 82947 ASSAY GLUCOSE BLOOD QUANT: CPT | Performed by: OBSTETRICS & GYNECOLOGY

## 2018-03-05 PROCEDURE — 25000128 H RX IP 250 OP 636: Performed by: OBSTETRICS & GYNECOLOGY

## 2018-03-05 PROCEDURE — 86901 BLOOD TYPING SEROLOGIC RH(D): CPT | Performed by: OBSTETRICS & GYNECOLOGY

## 2018-03-05 PROCEDURE — 3E0R3BZ INTRODUCTION OF ANESTHETIC AGENT INTO SPINAL CANAL, PERCUTANEOUS APPROACH: ICD-10-PCS | Performed by: ANESTHESIOLOGY

## 2018-03-05 PROCEDURE — 86900 BLOOD TYPING SEROLOGIC ABO: CPT | Performed by: OBSTETRICS & GYNECOLOGY

## 2018-03-05 PROCEDURE — 12000037 ZZH R&B POSTPARTUM INTERMEDIATE

## 2018-03-05 PROCEDURE — 84460 ALANINE AMINO (ALT) (SGPT): CPT | Performed by: OBSTETRICS & GYNECOLOGY

## 2018-03-05 PROCEDURE — 37000011 ZZH ANESTHESIA WARD SERVICE

## 2018-03-05 PROCEDURE — 0HQ9XZZ REPAIR PERINEUM SKIN, EXTERNAL APPROACH: ICD-10-PCS | Performed by: OBSTETRICS & GYNECOLOGY

## 2018-03-05 PROCEDURE — 25000128 H RX IP 250 OP 636: Performed by: ANESTHESIOLOGY

## 2018-03-05 PROCEDURE — 00000146 ZZHCL STATISTIC GLUCOSE BY METER IP

## 2018-03-05 PROCEDURE — 84550 ASSAY OF BLOOD/URIC ACID: CPT | Performed by: OBSTETRICS & GYNECOLOGY

## 2018-03-05 PROCEDURE — 00HU33Z INSERTION OF INFUSION DEVICE INTO SPINAL CANAL, PERCUTANEOUS APPROACH: ICD-10-PCS | Performed by: ANESTHESIOLOGY

## 2018-03-05 RX ORDER — NICOTINE POLACRILEX 4 MG
15-30 LOZENGE BUCCAL
Status: DISCONTINUED | OUTPATIENT
Start: 2018-03-05 | End: 2018-03-05

## 2018-03-05 RX ORDER — OXYTOCIN/0.9 % SODIUM CHLORIDE 30/500 ML
340 PLASTIC BAG, INJECTION (ML) INTRAVENOUS CONTINUOUS PRN
Status: DISCONTINUED | OUTPATIENT
Start: 2018-03-05 | End: 2018-03-08 | Stop reason: HOSPADM

## 2018-03-05 RX ORDER — FENTANYL CITRATE 50 UG/ML
50-100 INJECTION, SOLUTION INTRAMUSCULAR; INTRAVENOUS
Status: DISCONTINUED | OUTPATIENT
Start: 2018-03-05 | End: 2018-03-05

## 2018-03-05 RX ORDER — OXYTOCIN 10 [USP'U]/ML
10 INJECTION, SOLUTION INTRAMUSCULAR; INTRAVENOUS
Status: DISCONTINUED | OUTPATIENT
Start: 2018-03-05 | End: 2018-03-05

## 2018-03-05 RX ORDER — OXYTOCIN/0.9 % SODIUM CHLORIDE 30/500 ML
1-24 PLASTIC BAG, INJECTION (ML) INTRAVENOUS CONTINUOUS
Status: DISCONTINUED | OUTPATIENT
Start: 2018-03-05 | End: 2018-03-05

## 2018-03-05 RX ORDER — BISACODYL 10 MG
10 SUPPOSITORY, RECTAL RECTAL DAILY PRN
Status: DISCONTINUED | OUTPATIENT
Start: 2018-03-07 | End: 2018-03-08 | Stop reason: HOSPADM

## 2018-03-05 RX ORDER — SODIUM CHLORIDE 9 MG/ML
INJECTION, SOLUTION INTRAVENOUS CONTINUOUS
Status: DISCONTINUED | OUTPATIENT
Start: 2018-03-05 | End: 2018-03-05

## 2018-03-05 RX ORDER — PENICILLIN G POTASSIUM 5000000 [IU]/1
5 INJECTION, POWDER, FOR SOLUTION INTRAMUSCULAR; INTRAVENOUS ONCE
Status: COMPLETED | OUTPATIENT
Start: 2018-03-05 | End: 2018-03-05

## 2018-03-05 RX ORDER — ACETAMINOPHEN 325 MG/1
650 TABLET ORAL EVERY 4 HOURS PRN
Status: DISCONTINUED | OUTPATIENT
Start: 2018-03-05 | End: 2018-03-08 | Stop reason: HOSPADM

## 2018-03-05 RX ORDER — OXYCODONE AND ACETAMINOPHEN 5; 325 MG/1; MG/1
1 TABLET ORAL
Status: DISCONTINUED | OUTPATIENT
Start: 2018-03-05 | End: 2018-03-05

## 2018-03-05 RX ORDER — ACETAMINOPHEN 325 MG/1
650 TABLET ORAL EVERY 4 HOURS PRN
Status: DISCONTINUED | OUTPATIENT
Start: 2018-03-05 | End: 2018-03-05

## 2018-03-05 RX ORDER — MISOPROSTOL 200 UG/1
400 TABLET ORAL
Status: DISCONTINUED | OUTPATIENT
Start: 2018-03-05 | End: 2018-03-08 | Stop reason: HOSPADM

## 2018-03-05 RX ORDER — LIDOCAINE HYDROCHLORIDE AND EPINEPHRINE 15; 5 MG/ML; UG/ML
INJECTION, SOLUTION EPIDURAL PRN
Status: DISCONTINUED | OUTPATIENT
Start: 2018-03-05 | End: 2018-03-06 | Stop reason: HOSPADM

## 2018-03-05 RX ORDER — OXYTOCIN/0.9 % SODIUM CHLORIDE 30/500 ML
100-340 PLASTIC BAG, INJECTION (ML) INTRAVENOUS CONTINUOUS PRN
Status: COMPLETED | OUTPATIENT
Start: 2018-03-05 | End: 2018-03-05

## 2018-03-05 RX ORDER — OXYTOCIN 10 [USP'U]/ML
10 INJECTION, SOLUTION INTRAMUSCULAR; INTRAVENOUS
Status: DISCONTINUED | OUTPATIENT
Start: 2018-03-05 | End: 2018-03-08 | Stop reason: HOSPADM

## 2018-03-05 RX ORDER — DEXTROSE MONOHYDRATE 25 G/50ML
25-50 INJECTION, SOLUTION INTRAVENOUS
Status: DISCONTINUED | OUTPATIENT
Start: 2018-03-05 | End: 2018-03-08 | Stop reason: HOSPADM

## 2018-03-05 RX ORDER — ROPIVACAINE HYDROCHLORIDE 2 MG/ML
10 INJECTION, SOLUTION EPIDURAL; INFILTRATION; PERINEURAL ONCE
Status: COMPLETED | OUTPATIENT
Start: 2018-03-05 | End: 2018-03-05

## 2018-03-05 RX ORDER — DEXTROSE, SODIUM CHLORIDE, SODIUM LACTATE, POTASSIUM CHLORIDE, AND CALCIUM CHLORIDE 5; .6; .31; .03; .02 G/100ML; G/100ML; G/100ML; G/100ML; G/100ML
INJECTION, SOLUTION INTRAVENOUS CONTINUOUS
Status: DISCONTINUED | OUTPATIENT
Start: 2018-03-05 | End: 2018-03-05

## 2018-03-05 RX ORDER — SODIUM CHLORIDE, SODIUM LACTATE, POTASSIUM CHLORIDE, CALCIUM CHLORIDE 600; 310; 30; 20 MG/100ML; MG/100ML; MG/100ML; MG/100ML
INJECTION, SOLUTION INTRAVENOUS CONTINUOUS
Status: DISCONTINUED | OUTPATIENT
Start: 2018-03-05 | End: 2018-03-05

## 2018-03-05 RX ORDER — HYDROCORTISONE 2.5 %
CREAM (GRAM) TOPICAL 3 TIMES DAILY PRN
Status: DISCONTINUED | OUTPATIENT
Start: 2018-03-05 | End: 2018-03-08 | Stop reason: HOSPADM

## 2018-03-05 RX ORDER — DEXTROSE MONOHYDRATE 25 G/50ML
25-50 INJECTION, SOLUTION INTRAVENOUS
Status: DISCONTINUED | OUTPATIENT
Start: 2018-03-05 | End: 2018-03-05

## 2018-03-05 RX ORDER — ROPIVACAINE HYDROCHLORIDE 2 MG/ML
INJECTION, SOLUTION EPIDURAL; INFILTRATION; PERINEURAL
Status: COMPLETED
Start: 2018-03-05 | End: 2018-03-05

## 2018-03-05 RX ORDER — ONDANSETRON 2 MG/ML
4 INJECTION INTRAMUSCULAR; INTRAVENOUS EVERY 6 HOURS PRN
Status: DISCONTINUED | OUTPATIENT
Start: 2018-03-05 | End: 2018-03-05

## 2018-03-05 RX ORDER — CARBOPROST TROMETHAMINE 250 UG/ML
250 INJECTION, SOLUTION INTRAMUSCULAR
Status: DISCONTINUED | OUTPATIENT
Start: 2018-03-05 | End: 2018-03-05

## 2018-03-05 RX ORDER — LANOLIN 100 %
OINTMENT (GRAM) TOPICAL
Status: DISCONTINUED | OUTPATIENT
Start: 2018-03-05 | End: 2018-03-08 | Stop reason: HOSPADM

## 2018-03-05 RX ORDER — NICOTINE POLACRILEX 4 MG
15-30 LOZENGE BUCCAL
Status: DISCONTINUED | OUTPATIENT
Start: 2018-03-05 | End: 2018-03-08 | Stop reason: HOSPADM

## 2018-03-05 RX ORDER — AMOXICILLIN 250 MG
1 CAPSULE ORAL 2 TIMES DAILY PRN
Status: DISCONTINUED | OUTPATIENT
Start: 2018-03-05 | End: 2018-03-08 | Stop reason: HOSPADM

## 2018-03-05 RX ORDER — EPHEDRINE SULFATE 50 MG/ML
5 INJECTION, SOLUTION INTRAMUSCULAR; INTRAVENOUS; SUBCUTANEOUS
Status: DISCONTINUED | OUTPATIENT
Start: 2018-03-05 | End: 2018-03-05

## 2018-03-05 RX ORDER — EPHEDRINE SULFATE 50 MG/ML
INJECTION, SOLUTION INTRAMUSCULAR; INTRAVENOUS; SUBCUTANEOUS
Status: DISCONTINUED
Start: 2018-03-05 | End: 2018-03-05 | Stop reason: WASHOUT

## 2018-03-05 RX ORDER — NALOXONE HYDROCHLORIDE 0.4 MG/ML
.1-.4 INJECTION, SOLUTION INTRAMUSCULAR; INTRAVENOUS; SUBCUTANEOUS
Status: DISCONTINUED | OUTPATIENT
Start: 2018-03-05 | End: 2018-03-05

## 2018-03-05 RX ORDER — LIDOCAINE 40 MG/G
CREAM TOPICAL
Status: DISCONTINUED | OUTPATIENT
Start: 2018-03-05 | End: 2018-03-05

## 2018-03-05 RX ORDER — IBUPROFEN 400 MG/1
800 TABLET, FILM COATED ORAL
Status: DISCONTINUED | OUTPATIENT
Start: 2018-03-05 | End: 2018-03-05

## 2018-03-05 RX ORDER — IBUPROFEN 600 MG/1
600 TABLET, FILM COATED ORAL EVERY 6 HOURS PRN
Status: DISCONTINUED | OUTPATIENT
Start: 2018-03-05 | End: 2018-03-06

## 2018-03-05 RX ORDER — NALBUPHINE HYDROCHLORIDE 10 MG/ML
2.5-5 INJECTION, SOLUTION INTRAMUSCULAR; INTRAVENOUS; SUBCUTANEOUS EVERY 6 HOURS PRN
Status: DISCONTINUED | OUTPATIENT
Start: 2018-03-05 | End: 2018-03-05

## 2018-03-05 RX ORDER — METHYLERGONOVINE MALEATE 0.2 MG/ML
200 INJECTION INTRAVENOUS
Status: DISCONTINUED | OUTPATIENT
Start: 2018-03-05 | End: 2018-03-05

## 2018-03-05 RX ORDER — AMOXICILLIN 250 MG
2 CAPSULE ORAL 2 TIMES DAILY PRN
Status: DISCONTINUED | OUTPATIENT
Start: 2018-03-05 | End: 2018-03-08 | Stop reason: HOSPADM

## 2018-03-05 RX ORDER — NALOXONE HYDROCHLORIDE 0.4 MG/ML
.1-.4 INJECTION, SOLUTION INTRAMUSCULAR; INTRAVENOUS; SUBCUTANEOUS
Status: DISCONTINUED | OUTPATIENT
Start: 2018-03-05 | End: 2018-03-08 | Stop reason: HOSPADM

## 2018-03-05 RX ORDER — HYDROCODONE BITARTRATE AND ACETAMINOPHEN 5; 325 MG/1; MG/1
1 TABLET ORAL EVERY 4 HOURS PRN
Status: DISCONTINUED | OUTPATIENT
Start: 2018-03-05 | End: 2018-03-08 | Stop reason: HOSPADM

## 2018-03-05 RX ORDER — OXYTOCIN/0.9 % SODIUM CHLORIDE 30/500 ML
100 PLASTIC BAG, INJECTION (ML) INTRAVENOUS CONTINUOUS
Status: DISCONTINUED | OUTPATIENT
Start: 2018-03-05 | End: 2018-03-08 | Stop reason: HOSPADM

## 2018-03-05 RX ADMIN — OXYTOCIN-SODIUM CHLORIDE 0.9% IV SOLN 30 UNIT/500ML 1 MILLI-UNITS/MIN: 30-0.9/5 SOLUTION at 11:11

## 2018-03-05 RX ADMIN — SODIUM CHLORIDE 2.5 MILLION UNITS: 9 INJECTION, SOLUTION INTRAVENOUS at 12:05

## 2018-03-05 RX ADMIN — ROPIVACAINE HYDROCHLORIDE 10 ML: 2 INJECTION, SOLUTION EPIDURAL; INFILTRATION at 15:06

## 2018-03-05 RX ADMIN — LIDOCAINE HYDROCHLORIDE AND EPINEPHRINE 3 ML: 15; 5 INJECTION, SOLUTION EPIDURAL at 15:03

## 2018-03-05 RX ADMIN — ACETAMINOPHEN 650 MG: 325 TABLET, FILM COATED ORAL at 21:03

## 2018-03-05 RX ADMIN — OXYTOCIN-SODIUM CHLORIDE 0.9% IV SOLN 30 UNIT/500ML 340 ML/HR: 30-0.9/5 SOLUTION at 17:33

## 2018-03-05 RX ADMIN — SODIUM CHLORIDE 2.5 MILLION UNITS: 9 INJECTION, SOLUTION INTRAVENOUS at 15:54

## 2018-03-05 RX ADMIN — SODIUM CHLORIDE, POTASSIUM CHLORIDE, SODIUM LACTATE AND CALCIUM CHLORIDE 1000 ML: 600; 310; 30; 20 INJECTION, SOLUTION INTRAVENOUS at 14:30

## 2018-03-05 RX ADMIN — PENICILLIN G POTASSIUM 5 MILLION UNITS: 5000000 POWDER, FOR SOLUTION INTRAMUSCULAR; INTRAPLEURAL; INTRATHECAL; INTRAVENOUS at 08:13

## 2018-03-05 RX ADMIN — SODIUM CHLORIDE, POTASSIUM CHLORIDE, SODIUM LACTATE AND CALCIUM CHLORIDE: 600; 310; 30; 20 INJECTION, SOLUTION INTRAVENOUS at 08:13

## 2018-03-05 RX ADMIN — SENNOSIDES AND DOCUSATE SODIUM 1 TABLET: 8.6; 5 TABLET ORAL at 21:03

## 2018-03-05 RX ADMIN — Medication 12 ML/HR: at 15:19

## 2018-03-05 RX ADMIN — SODIUM CHLORIDE, SODIUM LACTATE, POTASSIUM CHLORIDE, CALCIUM CHLORIDE, AND DEXTROSE MONOHYDRATE: 600; 310; 30; 20; 5 INJECTION, SOLUTION INTRAVENOUS at 15:23

## 2018-03-05 RX ADMIN — IBUPROFEN 600 MG: 600 TABLET ORAL at 21:03

## 2018-03-05 NOTE — PROGRESS NOTES
Patient is resting comfortably, does not feel contractions yet    -156/79-92    VE 3/50/-3 AROM clear at 12pm   bpm moderate variability +accelerations, no decelerations  [place] contractions every 5-10 min    Pitocin at 2 miu/min    A/P: 32 year old  at 41 weeks 1 day, GDMA1, here for IOL  Continue pitocin induction  GBS+, will continue antibiotics.  Discussed epidural for pain control, upon patient request  Mildly elevated BP, normal labs, will continue to monitor.    Jennifer L. Schwab M.D.

## 2018-03-05 NOTE — PROGRESS NOTES
Pt comfortable with epidural.  SVE 7/90/-2  FHT's reactive  Pit on 16 Miu  Glu all WNL  Continue present management.

## 2018-03-05 NOTE — DISCHARGE INSTRUCTIONS
Data: [facility].   Reason for maternal/fetal assessment per patient is favorable cervix for induction. Patient is a . Prenatal record reviewed.      Gestational Age 41w0d. VSS. Cervix: posterior.  Fetal movement present. Patient denies cramping, backache, vaginal discharge, pelvic pressure, UTI symptoms, GI problems, bloody show, vaginal bleeding, edema, headache, visual disturbances, epigastric or URQ pain, abdominal pain, rupture of membranes. Support persons  present.  Action: Verbal consent for EFM. Triage assessment completed. EFM applied for reactive NST. Uterine assessment no ctx. Fetal assessment: Presumed adequate fetal oxygenation documented (see flow record). Patient education pamphlets given on fetal movement counts . Patient instructed to report change in fetal movement, vaginal leaking of fluid or bleeding, abdominal pain, or any concerns related to the pregnancy to her nurse/physician.   Response: Dr. Valenzuela informed of SVE. Plan per provider is return in AM for scheduled induction with Dr Sullivan. Patient verbalized understanding of education and verbalized agreement with plan. Discharged ambulatory at 1910.    Face to face time: 0-15 minutes

## 2018-03-05 NOTE — IP AVS SNAPSHOT
MRN:[id number]                      After Visit Summary   3/5/2018    Mark Cervantes    MRN: [id number]           Thank you!     Thank you for choosing [facility] for your care. Our goal is always to provide you with excellent care. Hearing back from our patients is one way we can continue to improve our services. Please take a few minutes to complete the written survey that you may receive in the mail after you visit with us. Thank you!        Patient Information     Date Of Birth          1985        Designated Caregiver       Most Recent Value    Caregiver    Name of designated caregiver Delfino Vargas    Phone number of caregiver [phone]      About your hospital stay     You were admitted on:  March 5, 2018 You last received care in the:  [address]    You were discharged on:  March 8, 2018        Reason for your hospital stay       Maternity care                  Who to Call     For medical emergencies, please call 911.  For non-urgent questions about your medical care, please call your primary care provider or clinic, [phone]          Attending Provider     Provider Specialty    Jie Navarro MD OB/Gyn       Primary Care Provider Office Phone # Fax #    Jie Navarro [phone]      After Care Instructions     Activity       Review discharge instructions            Diet       Resume previous diet            Discharge Instructions - Hypertensive disorders patients       High Blood pressure patients to follow up in clinic or via home care within one week for a blood pressure check            Discharge Instructions - Postpartum visit       Schedule postpartum visit with your provider and return to clinic in 6 weeks.                  Follow-up Appointments     Follow Up and recommended labs and tests       Follow-up in 7 days for BP check.                  Further instructions from your care team       Postpartum Vaginal Delivery  Instructions    Activity       Ask family and friends for help when you need it.    Do not place anything in your vagina for 6 weeks.    You are not restricted on other activities, but take it easy for a few weeks to allow your body to recover from delivery.  You are able to do any activities you feel up to that point.    No driving until you have stopped taking your pain medications (usually two weeks after delivery).     Call your health care provider if you have any of these symptoms:       Increased pain, swelling, redness, or fluid around your stiches from an episiotomy or perineal tear.    A fever above 100.4 F (38 C) with or without chills when placing a thermometer under your tongue.    You soak a sanitary pad with blood within 1 hour, or you see blood clots larger than a golf ball.    Bleeding that lasts more than 6 weeks.    Vaginal discharge that smells bad.    Severe pain, cramping or tenderness in your lower belly area.    A need to urinate more frequently (use the toilet more often), more urgently (use the toilet very quickly), or it burns when you urinate.    Nausea and vomiting.    Redness, swelling or pain around a vein in your leg.    Problems breastfeeding or a red or painful area on your breast.    Chest pain and cough or are gasping for air.    Problems coping with sadness, anxiety, or depression.  If you have any concerns about hurting yourself or the baby, call your provider immediately.     You have questions or concerns after you return home.     Keep your hands clean:  Always wash your hands before touching your perineal area and stitches.  This helps reduce your risk of infection.  If your hands aren't dirty, you may use an alcohol hand-rub to clean your hands. Keep your nails clean and short.      Preeclampsia   Call your doctor right away if you have any of the following:  - Edema (swelling) in your face or hands  - Rapid weight gain-about 1 pound or more in a  "day  - Headache  - Abdominal pain on your right side  - Vision problems (flashes or spots)  - You have questions or concerns once you return home.      Pending Results     No orders found from 3/3/2018 to 3/6/2018.            Statement of Approval     Ordered          18 0846  I have reviewed and agree with all the recommendations and orders detailed in this document.  EFFECTIVE NOW     Approved and electronically signed by:  Parrish Whitaker MD             Admission Information     Date & Time Provider Department Dept. Phone    3/5/2018 Jie Navarro MD [address] [phone]      Your Vitals Were     Blood Pressure Pulse Temperature Respirations Height Weight    144/89 83 99.1  F (37.3  C) (Oral) 16 1.676 m (5' 6\") 98 kg (216 lb)    Last Period Pulse Oximetry BMI (Body Mass Index)             2017 97% 34.86 kg/m2         MyChart Information     [employer] lets you send messages to your doctor, view your test results, renew your prescriptions, schedule appointments and more. To sign up, go to [email or web address] . Click on \"Log in\" on the left side of the screen, which will take you to the Welcome page. Then click on \"Sign up Now\" on the right side of the page.     You will be asked to enter the access code listed below, as well as some personal information. Please follow the directions to create your username and password.     Your access code is: [email or web address]  Expires: 2018 10:39 AM     Your access code will  in 90 days. If you need help or a new code, please call your [facility] clinic or [phone].        Care EveryWhere ID     This is your Care EveryWhere ID. This could be used by other organizations to access your [facility] medical records  [id number]        Equal Access to Services     [facility] : Ignacio Maldonado, wagrabiel tierney, qaybta kaalcamila zambrano, johnathan best [facility] [phone].    ATENCIÓN: " Si habla linda, tiene a batista disposición servicios gratuitos de asistencia lingüística. Eric meza [phone].    We comply with applicable federal civil rights laws and [place] laws. We do not discriminate on the basis of race, color, national origin, age, disability, sex, sexual orientation, or gender identity.               Review of your medicines      START taking        Dose / Directions    labetalol 200 MG tablet   Commonly known as:  NORMODYNE   Notes to Patient:  Take this medication 12 hours apart, around 8 AM and 8PM each day.        Dose:  200 mg   Take 1 tablet (200 mg) by mouth every 12 hours   Quantity:  60 tablet   Refills:  1         CONTINUE these medicines which have NOT CHANGED        Dose / Directions    ONE-A-DAY WOMENS PRENATAL 1 PO        Take by mouth daily   Refills:  0       VITRON-C PO        Take by mouth daily   Refills:  0            Where to get your medicines      Some of these will need a paper prescription and others can be bought over the counter. Ask your nurse if you have questions.     Bring a paper prescription for each of these medications     labetalol 200 MG tablet                Protect others around you: Learn how to safely use, store and throw away your medicines at www.disposemymeds.org.             Medication List: This is a list of all your medications and when to take them. Check marks below indicate your daily home schedule. Keep this list as a reference.      Medications           Morning Afternoon Evening Bedtime As Needed    labetalol 200 MG tablet   Commonly known as:  NORMODYNE   Take 1 tablet (200 mg) by mouth every 12 hours   Last time this was given:  200 mg on 3/8/2018  8:35 AM   Next Dose Due:  8 PM tonight   Notes to Patient:  Take this medication 12 hours apart, around 8 AM and 8PM each day.                                      ONE-A-DAY WOMENS PRENATAL 1 PO   Take by mouth daily                                   VITRON-C PO   Take by mouth daily

## 2018-03-05 NOTE — H&P
Significant change in general health status.  Elevated BP's noted.   See prenatal record and notation in the progress note.    EFW: 9lb

## 2018-03-05 NOTE — L&D DELIVERY NOTE
Delivery Summary    Mark Cervantes MRN# [id number]   Age: 32 year old YOB: 1985     ASSESSMENT & PLAN: 41 week with GDM for IOL. She had some elevated BP's.  PIH labs normal.  Her BP's were improved when she was in lateral position.  She received her GBS abx and AROM performed.  She received labor epidural.  FHT's remained reactive.  Pitocin max of 16 MIU.  She made good progress.  She pushed well.  She has a 1st degree lac at the introitus repaired  With 3 interrupted 3-0 chromic and 1st degree near the clitoris reapproximated with 3-0 chromic.        Labor Event Times    Labor onset date:  3/5/18 Onset time:   3:00 PM   Dilation complete date:  3/5/18 Complete time:   5:00 PM   Start pushing date/time:  3/5/2018 1715            Labor Events     labor?:  No    steroids:  None   Labor Type:  Induction, AROM   Predominate monitoring during 1st stage:  continuous electronic fetal monitoring      Antibiotics received during labor?:  Yes   Reason for Antibiotics:  GBS   Antibiotics received for GBS:  Penicillin   Antibiotics Given (GBS):  Greater than 4 hours prior to delivery      Rupture date/time: 3/5/18 1200   Rupture type:  Artificial Rupture of Membranes   Fluid color:  Clear      Induction:  AROM   Induction date/time:     Cervical ripening date/time:     Indications for induction:  Post-term Gestation      1:1 continuous labor support provided by?:  RN          Delivery/Placenta Date and Time    Delivery Date:  3/5/18 Delivery Time:   5:19 PM   Placenta Date/Time:  3/5/2018  5:29 PM   Oxytocin given at the time of delivery:  after delivery of placenta      Vaginal Counts    Initial count performed by 2 team members:   Two Team Members   Dr. Melanie Chiang RN          [employer]   Initial counts       Added to count       Final counts          Placed during labor Accounted for at the end of labor   No    No    No       Final count  "performed by 2 team members:   Two Team Members   Dr. Melanie Chiang RN         Final count correct?:  Yes         Apgars    Living status:  Living    1 Minute 5 Minute 10 Minute 15 Minute 20 Minute   Skin color: 1  1       Heart rate: 2  2       Reflex irritability: 2  2       Muscle tone: 2  2       Respiratory effort: 2  2       Total: 9  9          Apgars assigned by:  MARAL SHAH RN      Cord    Vessels:  3 Vessels Complications:  None   Cord Blood Disposition:  Lab Gases Sent?:  No         [place] Measurements    Weight:  8 lb 5.3 oz Length:  1' 9\"   Head circumference:  35.6 cm       Skin to Skin and Feeding Plan    Skin to skin initiation date/time: 3/5/18 1727   Skin to skin with:  Mother   Skin to skin end date/time:     How do you plan to feed your baby:  Breastfeeding      Labor Events and Shoulder Dystocia    Fetal Tracing Prior to Delivery:  Category 1   Shoulder dystocia present?:  Neg            Delivery (Maternal) (Provider to Complete) (271014)    Episiotomy:  None   Perineal lacerations:  1st    Vaginal laceration?:  No    Cervical laceration?:  No    Est. blood loss (mL):  300         Mother's Information  Mother: Mark Cervantes #[id number]    Start of Mother's Information     IO Blood Loss  18 1500 - 18 1742    Mom's I/O Activity            End of Mother's Information  Mother: Mark Cervantes #[id number]            Delivery - Provider to Complete ([id number])    Delivering clinician:  JIE MACEDO   Attempted Delivery Types (Choose all that apply):  Spontaneous Vaginal Delivery   Delivery Type (Choose the 1 that will go to the Birth History):  Vaginal, Spontaneous Delivery                           Placenta    Delayed Cord Clamping:  Done   Date/Time:  3/5/2018  5:29 PM   Removal:  Spontaneous   Disposition:  Hospital disposal      Anesthesia    Method:  Epidural         Presentation and Position    Presentation:  Vertex    Occiput Anterior                    Jie " MD Melanie

## 2018-03-05 NOTE — ANESTHESIA PROCEDURE NOTES
Peripheral nerve/Neuraxial procedure note : epidural catheter  Pre-Procedure  Performed by ZHEN BUSTOS  Location: OB      Pre-Anesthestic Checklist: patient identified, IV checked, risks and benefits discussed, informed consent and pre-op evaluation    Timeout  Correct Patient: Yes   Correct Procedure: Yes   Correct Site: Yes   Correct Laterality: N/A   Correct Position: Yes   Site Marked: N/A   .   Procedure Documentation    .    Procedure:    Epidural catheter.  Insertion Site:L3-4  (midline approach) Injection technique: LORT saline   Local skin infiltrated with mL of 1% lidocaine.       Patient Prep;mask, sterile gloves, povidone-iodine 7.5% surgical scrub, patient draped.  .  Needle: Touhy needle Needle Gauge: 17.    Needle Length (Inches) 3.5  # of attempts: 1 and # of redirects:  .   . .  Catheter threaded easily  5 cm epidural space.  .   .    Assessment/Narrative  Paresthesias: No.  .  .  Aspiration negative for heme or CSF  . Test dose of 3 mL lidocaine 1.5% w/ 1:200,000 epinephrine at. Test dose negative for signs of intravascular, subdural or intrathecal injection. Comments:  10cc 0.2% ropivicaine in divided doses

## 2018-03-05 NOTE — PLAN OF CARE
3/4/18 1830-  EDC 18- 41 weeks- Pt of Dr Sullivan- Scheduled for induction in AM 3/5/18- SVE  To check if cervix is favorable for induction in AM-if unfavorable than admit tonight for Cervical ripening.  - SVE 2+/70/-2 soft DR Valenzuela notified of SVE -will D/C to home tonight and will return for induction in AM. Pt acknowledges D/C instructions with verbal understanding.

## 2018-03-05 NOTE — IP AVS SNAPSHOT
[address]    [place] [address]    Phone:  [phone]                                       After Visit Summary   3/5/2018    Mark Cervantes    MRN: [id number]           After Visit Summary Signature Page     I have received my discharge instructions, and my questions have been answered. I have discussed any challenges I see with this plan with the nurse or doctor.    ..........................................................................................................................................  Patient/Patient Representative Signature      ..........................................................................................................................................  Patient Representative Print Name and Relationship to Patient    ..................................................               ................................................  Date                                            Time    ..........................................................................................................................................  Reviewed by Signature/Title    ...................................................              ..............................................  Date                                                            Time

## 2018-03-05 NOTE — ANESTHESIA PREPROCEDURE EVALUATION
Physical Exam      Airway     Dental     Cardiovascular       Pulmonary     Other findings: GDM, diet-controlled                Anesthesia Plan      History & Physical Review      ASA Status:  .  OB Epidural Asa: 2            Postoperative Care      Consents  Anesthetic plan, risks, benefits and alternatives discussed with:  Patient..                          .

## 2018-03-06 LAB — GLUCOSE BLDC GLUCOMTR-MCNC: 80 MG/DL (ref 70–99)

## 2018-03-06 PROCEDURE — 25000132 ZZH RX MED GY IP 250 OP 250 PS 637: Performed by: OBSTETRICS & GYNECOLOGY

## 2018-03-06 PROCEDURE — 00000146 ZZHCL STATISTIC GLUCOSE BY METER IP

## 2018-03-06 PROCEDURE — 12000037 ZZH R&B POSTPARTUM INTERMEDIATE

## 2018-03-06 RX ORDER — LABETALOL 200 MG/1
200 TABLET, FILM COATED ORAL EVERY 12 HOURS SCHEDULED
Status: DISCONTINUED | OUTPATIENT
Start: 2018-03-06 | End: 2018-03-08 | Stop reason: HOSPADM

## 2018-03-06 RX ADMIN — ACETAMINOPHEN 650 MG: 325 TABLET, FILM COATED ORAL at 03:32

## 2018-03-06 RX ADMIN — ACETAMINOPHEN 650 MG: 325 TABLET, FILM COATED ORAL at 19:16

## 2018-03-06 RX ADMIN — ACETAMINOPHEN 650 MG: 325 TABLET, FILM COATED ORAL at 10:28

## 2018-03-06 RX ADMIN — ACETAMINOPHEN 650 MG: 325 TABLET, FILM COATED ORAL at 23:22

## 2018-03-06 RX ADMIN — LABETALOL HYDROCHLORIDE 200 MG: 200 TABLET, FILM COATED ORAL at 23:22

## 2018-03-06 RX ADMIN — SENNOSIDES AND DOCUSATE SODIUM 1 TABLET: 8.6; 5 TABLET ORAL at 10:28

## 2018-03-06 RX ADMIN — ACETAMINOPHEN 650 MG: 325 TABLET, FILM COATED ORAL at 14:40

## 2018-03-06 RX ADMIN — IBUPROFEN 600 MG: 600 TABLET ORAL at 03:32

## 2018-03-06 RX ADMIN — SENNOSIDES AND DOCUSATE SODIUM 1 TABLET: 8.6; 5 TABLET ORAL at 19:16

## 2018-03-06 NOTE — LACTATION NOTE
Initial visit.    Advised to breastfeed exclusively, on demand, avoid pacifiers, bottles and formula unless medically indicated.  Encouraged rooming in, skin to skin, feeding on demand 8-12x/day or sooner if baby cues.  Explained benefits of holding and skin to skin.  Encouraged lots of skin to skin.   Continues to nurse well per mom.   Encouraged to review  booklet.    Will follow as needed.   Oma Cornelius RNC, IBCLC

## 2018-03-06 NOTE — PROGRESS NOTES
PPD #1  Induction, GDM    No complaints, no headache.  First son, very excited about it.    bp 140-150/65-94      Current bp 139/94  98.1    Blood sugar this AM 80    Doing well  bp labile,     Plans home tomorrow  Monitor bp closely

## 2018-03-06 NOTE — ANESTHESIA POSTPROCEDURE EVALUATION
Patient: Mark Cervantes    * No procedures listed *    Diagnosis:* No pre-op diagnosis entered *  Diagnosis Additional Information: No value filed.    Anesthesia Type:  No value filed.    Note:  Anesthesia Post Evaluation    Patient location during evaluation: floor  Patient participation: Able to fully participate in evaluation  Level of consciousness: awake and awake and alert  Pain management: adequate  Airway patency: patent  Cardiovascular status: acceptable  Respiratory status: acceptable  Hydration status: acceptable  PONV: none     Anesthetic complications: None    Comments: No epidural related complaints        Last vitals:  Vitals:    03/06/18 0754 03/06/18 1026 03/06/18 1300   BP: (!) 139/94 123/79 137/85   Pulse:      Resp: 16     Temp: 37.1  C (98.7  F)     SpO2:            Electronically Signed By: Lilian Saez  March 6, 2018  4:14 PM

## 2018-03-06 NOTE — PROGRESS NOTES
PPD 1    No c/o  No HA    Afebrile  BPs elevated since admission yesterday morning  139/94 today  Many 140s/high 80s since delivery    Fundus nt  Ext nt  Normal DTR    Rh positive  Rubella immune    A/p    PPD 1     yesterday @ 1719    BPs elevated since admission  Preeclampsia labs @ admission were unremarkable    Will follow BPs throughout the day; disc possible need for anti-HTN rx if HTN persists    Note no previous history of HTN or gestational HTN    Nicolas SÁNCHEZ

## 2018-03-07 PROCEDURE — 25000132 ZZH RX MED GY IP 250 OP 250 PS 637: Performed by: OBSTETRICS & GYNECOLOGY

## 2018-03-07 PROCEDURE — 12000037 ZZH R&B POSTPARTUM INTERMEDIATE

## 2018-03-07 RX ADMIN — SENNOSIDES AND DOCUSATE SODIUM 1 TABLET: 8.6; 5 TABLET ORAL at 12:09

## 2018-03-07 RX ADMIN — ACETAMINOPHEN 650 MG: 325 TABLET, FILM COATED ORAL at 03:10

## 2018-03-07 RX ADMIN — LABETALOL HYDROCHLORIDE 200 MG: 200 TABLET, FILM COATED ORAL at 08:23

## 2018-03-07 RX ADMIN — ACETAMINOPHEN 650 MG: 325 TABLET, FILM COATED ORAL at 06:53

## 2018-03-07 RX ADMIN — ACETAMINOPHEN 650 MG: 325 TABLET, FILM COATED ORAL at 21:02

## 2018-03-07 RX ADMIN — SENNOSIDES AND DOCUSATE SODIUM 1 TABLET: 8.6; 5 TABLET ORAL at 21:03

## 2018-03-07 RX ADMIN — ACETAMINOPHEN 650 MG: 325 TABLET, FILM COATED ORAL at 12:09

## 2018-03-07 RX ADMIN — LABETALOL HYDROCHLORIDE 200 MG: 200 TABLET, FILM COATED ORAL at 19:57

## 2018-03-07 NOTE — PROGRESS NOTES
PPD #2    No complaints today, no headache.      BP was up last evening, 140/97 and 147/97, was started on labetalol 200 mg bid just before midnight.    /78 at 0311 hrs after first dose of labetalol    IMP: Hypertension post delivery, just started labetalol    Plan: continue to monitor bp today to see if the current dose of labetalol is appropriate.    Plan home tomorrow if bp is acceptable.

## 2018-03-08 VITALS
TEMPERATURE: 99.1 F | HEART RATE: 83 BPM | HEIGHT: 66 IN | WEIGHT: 216 LBS | SYSTOLIC BLOOD PRESSURE: 144 MMHG | BODY MASS INDEX: 34.72 KG/M2 | DIASTOLIC BLOOD PRESSURE: 89 MMHG | OXYGEN SATURATION: 97 % | RESPIRATION RATE: 16 BRPM

## 2018-03-08 PROCEDURE — 25000132 ZZH RX MED GY IP 250 OP 250 PS 637: Performed by: OBSTETRICS & GYNECOLOGY

## 2018-03-08 RX ORDER — LABETALOL 200 MG/1
200 TABLET, FILM COATED ORAL EVERY 12 HOURS
Qty: 60 TABLET | Refills: 1 | Status: SHIPPED | OUTPATIENT
Start: 2018-03-08 | End: 2019-11-14

## 2018-03-08 RX ADMIN — LABETALOL HYDROCHLORIDE 200 MG: 200 TABLET, FILM COATED ORAL at 08:35

## 2018-03-08 RX ADMIN — ACETAMINOPHEN 650 MG: 325 TABLET, FILM COATED ORAL at 01:49

## 2018-03-08 RX ADMIN — SENNOSIDES AND DOCUSATE SODIUM 1 TABLET: 8.6; 5 TABLET ORAL at 08:36

## 2018-03-08 NOTE — DISCHARGE INSTRUCTIONS
Postpartum Vaginal Delivery Instructions    Activity       Ask family and friends for help when you need it.    Do not place anything in your vagina for 6 weeks.    You are not restricted on other activities, but take it easy for a few weeks to allow your body to recover from delivery.  You are able to do any activities you feel up to that point.    No driving until you have stopped taking your pain medications (usually two weeks after delivery).     Call your health care provider if you have any of these symptoms:       Increased pain, swelling, redness, or fluid around your stiches from an episiotomy or perineal tear.    A fever above 100.4 F (38 C) with or without chills when placing a thermometer under your tongue.    You soak a sanitary pad with blood within 1 hour, or you see blood clots larger than a golf ball.    Bleeding that lasts more than 6 weeks.    Vaginal discharge that smells bad.    Severe pain, cramping or tenderness in your lower belly area.    A need to urinate more frequently (use the toilet more often), more urgently (use the toilet very quickly), or it burns when you urinate.    Nausea and vomiting.    Redness, swelling or pain around a vein in your leg.    Problems breastfeeding or a red or painful area on your breast.    Chest pain and cough or are gasping for air.    Problems coping with sadness, anxiety, or depression.  If you have any concerns about hurting yourself or the baby, call your provider immediately.     You have questions or concerns after you return home.     Keep your hands clean:  Always wash your hands before touching your perineal area and stitches.  This helps reduce your risk of infection.  If your hands aren't dirty, you may use an alcohol hand-rub to clean your hands. Keep your nails clean and short.      Preeclampsia   Call your doctor right away if you have any of the following:  - Edema (swelling) in your face or hands  - Rapid weight gain-about 1 pound or more in a  day  - Headache  - Abdominal pain on your right side  - Vision problems (flashes or spots)  - You have questions or concerns once you return home.

## 2018-03-08 NOTE — DISCHARGE SUMMARY
[facility] Discharge Summary    Mark Cervantes MRN# [id number]   Age: 32 year old YOB: 1985     Date of Admission:  3/5/2018  Date of Discharge::  3/8/2018  Admitting Physician:  Jie Navarro MD  Discharge Physician:  Parrish Whitaker MD     Home clinic: [facility]          Admission Diagnoses:   Intrauterine pregnancy at 41 weeks gestation  Gestational Diabetes  Induction of labor secondary to postdates and GDM          Discharge Diagnosis:   Normal spontaneous vaginal delivery\  Postpartum hypertension.          Procedures:   Procedure(s): Repair of first degree perineal laceration       No other procedures performed during this admission           Medications Prior to Admission:     Prescriptions Prior to Admission   Medication Sig Dispense Refill Last Dose     Iron-Vitamin C (VITRON-C PO) Take by mouth daily   Past Week at Unknown time     Prenat-Fe Carbonyl-FA-Omega 3 (ONE-A-DAY WOMENS PRENATAL 1 PO) Take by mouth daily   Past Week at Unknown time             Discharge Medications:     Current Discharge Medication List      START taking these medications    Details   labetalol (NORMODYNE) 200 MG tablet Take 1 tablet (200 mg) by mouth every 12 hours  Qty: 60 tablet, Refills: 1    Associated Diagnoses: Postpartum hypertension         CONTINUE these medications which have NOT CHANGED    Details   Iron-Vitamin C (VITRON-C PO) Take by mouth daily      Prenat-Fe Carbonyl-FA-Omega 3 (ONE-A-DAY WOMENS PRENATAL 1 PO) Take by mouth daily                   Consultations:   No consultations were requested during this admission          Brief History of Labor:   Admitted at 41 weeks gestation for induction of labor secondary to postdates and GDM. Uncomplicated labor and delivery. See delivery note for details.           Hospital Course:   The patient's hospital course was unremarkable except for some elevated BPs post delivery. She was started on oral Labetalol 200 mg PO BID. BPs on  discharge acceptable. On discharge, her pain was well controlled. Vaginal bleeding is similar to peak menstrual flow.  Voiding without difficulty.  Ambulating well and tolerating a normal diet.  No fever.  Breastfeeding well.  Infant is stable.  She was discharged on post-partum day #3.    Post-partum hemoglobin:   Hemoglobin   Date Value Ref Range Status   03/05/2018 Canceled, Test credited 11.7 - 15.7 g/dL Corrected     Comment:     Duplicate request  CORRECTED ON 03/05 AT 0923: PREVIOUSLY REPORTED AS 11.4     03/05/2018 11.4 (L) 11.7 - 15.7 g/dL Final             Discharge Instructions and Follow-Up:   Discharge diet: Regular   Discharge activity: Pelvic rest: abstain from intercourse and do not use tampons for 6 week(s)   Discharge follow-up: Follow up in 7 days for BP check and with Dr. Navarro in 6 weeks for postpartum visit   Wound care: Drink plenty of fluids  Ice to area for comfort           Discharge Disposition:   Discharged to home      Attestation:  Total time: 30 minutes    Parrish Whitaker MD

## 2018-03-08 NOTE — PLAN OF CARE
Dr. Navarro on the phone at 1010 and updated re: BPs and lab results. Pitocin orders received. Dr. Schwab then called at 1012 and updated re: pt's BPs,lab work, fht's, UC pattern, and antibiotics. Order received from Dr. Schwab to start Pitocin at 1100 and she will come AROM at 1200.  Discussed POC with pt who states understanding.  
Multip 41+1 weeks presents to MAC for medical induction of labor for gestational diabetes, diet controlled, and post dates. Monitor consent obtained and applied and discussed POC with pt who states understanding. BPs slightly elevated this morning. Pt denies any HA, epigastric pain, or visual disturbances. Antibiotics started for GBS positive. Dr Navarro paged and updated re: elevated BPs, reactive fht's with occ contractions, order received for pre-eclampsia labs. Dr. Schwab will be here to round on patient later morning and AROM patient. Discussed the POC with pt who states understanding.   
Patient arrived on floor accompanied by her mother with infant in arms.  She has had three other children here is  familiar with routine and safety protocol.  Nurse reviewed basic safety routines and introduced recent changes like the [place] Depression Questionnaire and the 5 digit codes.  All questions answered.    
Problem: Labor (Cervical Ripen, Induct, Augment) (Adult,Obstetrics,Pediatric)  Goal: Signs and Symptoms of Listed Potential Problems Will be Absent, Minimized or Managed (Labor)  Signs and symptoms of listed potential problems will be absent, minimized or managed by discharge/transition of care (reference Labor (Cervical Ripen, Induct, Augment) (Adult,Obstetrics,Pediatric) CPG).   Outcome: Completed Date Met: 18   viable male, apgars 9 and 9, by Dr. Navarro. See delivery summary.      
Problem: Patient Care Overview  Goal: Plan of Care/Patient Progress Review  Outcome: Adequate for Discharge Date Met: 03/08/18  D: BP remains 130s-140s/80s, assessments WDL. Denies headache, epigastric pain, blurred vision.  I: Pt. received complete discharge paperwork and home medications as filled by discharge pharmacy.  Pt. was given times of last dose for all discharge medications in writing on discharge medication sheets.  Discharge teaching included home medication, pain management, activity restrictions, postpartum cares, and signs and symptoms of infection.    A: Discharge outcomes on care plan met.  Mother states understanding and comfort with self and baby cares.  P: Pt. discharged to home.  Pt. was discharged with baby. Baby was discharged from care yesterday but has been rooming in.  Pt. was accompanied by , nurse and baby, and left with personal belongings.  Home care sent.  Pt. to follow up with OB per MD order, Dr. Whitaker recommended BP check in clinic next week.  Pt. had no further questions at the time of discharge and no unmet needs were identified.      
Problem: Patient Care Overview  Goal: Plan of Care/Patient Progress Review  Outcome: Improving  Patient had elevated B/P this am  139/94.  Dr. Valenzuela requested B/P's be done every 2-3 hours while patient awake.  B/P's have improved, 123/79 and 137/85.  Ibuprofen stopped and patient using Tylenol for cramping.  Fundus firm with no free flow or clots.  Voiding without difficulty.  Will continue to assist as needed.      
Problem: Patient Care Overview  Goal: Plan of Care/Patient Progress Review  Outcome: Improving  Patient is up independently.  She is bonding well with her son and working on breast feeding.  She is tolerating diet.  She had urine retention in recovery and was straight cathed for 600 at 1935.  She has been up to try to urinate independently.  All questions answered.        
Problem: Patient Care Overview  Goal: Plan of Care/Patient Progress Review  Outcome: Improving  Tylenol controlling pain and heating pad helping with cramps.  Bonding well with baby.  Independent with self and  care.        
Problem: Patient Care Overview  Goal: Plan of Care/Patient Progress Review  Outcome: Improving  VSS.  Pain well controlled, requesting prn pain medications as needed.  Up independently in room.  PO labetalol given for high blood pressures per Dr. Valenzuela.  Working on breastfeeding as well as bottle feeing formula to  and  cares. On pathway. Continue to monitor and notify MD as needed.      
Problem: Patient Care Overview  Goal: Plan of Care/Patient Progress Review  Outcome: Improving  VSS. BP's slightly elevated within parameters. PO lebetalol given at 2000. Pain managed with Tylenol. Bleeding is scant. Up independently. Bottle/Breastfeeding. Baby in room with grandmother room agreement signed. Will continue to monitor.      
Problem: Patient Care Overview  Goal: Plan of Care/Patient Progress Review  Outcome: No Change  Pt up in room, Fundus firm @U -1, w/scant rubra flow, Labetolol given BP re-check 123/75, Tylenol & stool softener given, continue to monitor.      
Problem: Postpartum (Vaginal Delivery) (Adult,Obstetrics,Pediatric)  Goal: Signs and Symptoms of Listed Potential Problems Will be Absent, Minimized or Managed (Postpartum)  Signs and symptoms of listed potential problems will be absent, minimized or managed by discharge/transition of care (reference Postpartum (Vaginal Delivery) (Adult,Obstetrics,Pediatric) CPG).   Outcome: No Change  VSS. Pain controlled with tylenol and ibuprofen. Straight cathed x1, then patient able to void 300 ml on own x1. Encouraged to keep drinking fluids. Breastfeeding and bottle feeding. Encouraged to call for latch checks, questions and concerns. Will continue to monitor.       
Pt assisted up to bathroom at 2030. Pt able to ambulate to bathroom with assist from RN. Pt able to void a small amount of urine. Pericare done by pt and taught by RN. Pt assisted back to bed by RN. Straight catheter for 600ml of urine. Pericare done by RN. Pt assisted to wheelchair for transfer to room 412. Pt transferred to room 412 by RN. Infant transferred in mother's arms. Report given to Veronika HICKS RN. Pt and infant care endorsed in stable condition.   
Spoke with Dr. Valenzuela and discussed the blood pressure of 140/97, which was still below the 155/100 parameter.    MD would like to have patient start Labetalol 200mg twice a day.  First dose as soon as possible.  TORB while Dr. Valenzuela on phone. Read each line to the MD and he confirmed dosage and frequency.   Reviewed the order with oncoming RN Selam PURCELL  Will continue to monitor patient and notify MD as needed.    
Breath sounds clear and equal bilaterally.

## 2018-03-08 NOTE — PROGRESS NOTES
[facility] Obstetrics Post-Partum Progress Note          Assessment and Plan:    Assessment:   Post-partum day #3  Normal spontaneous vaginal delivery  Postpartum hypertension  L&D complications: None      Doing well.  No excessive bleeding  Pain well-controlled.  BP stable/acceptable on Labetalol      Plan:   Discharge later today           Interval History:   Doing well.  Pain is well-controlled.  No fevers.  No history of foul-smelling vaginal discharge.  Good appetite.  Denies chest pain, shortness of breath, nausea or vomiting.  Vaginal bleeding is similar to a heavy menstrual flow.  Ambulatory.  Breastfeeding well.          Significant Problems:    None          Review of Systems:    The patient denies any chest pain, shortness of breath, excessive pain, fever, chills, purulent drainage from the wound, nausea or vomiting.          Medications:   All medications related to the patient's surgery have been reviewed          Physical Exam:     All vitals stable  Patient Vitals for the past 24 hrs:   BP Temp Temp src Pulse Heart Rate Resp   03/08/18 0835 144/89 99.1  F (37.3  C) Oral - 77 16   03/08/18 0500 133/88 - - - - -   03/08/18 0151 122/70 98.1  F (36.7  C) Oral 83 - 16   03/07/18 2229 138/85 98  F (36.7  C) Oral 91 91 16   03/07/18 1957 131/86 98.6  F (37  C) Oral 65 65 16   03/07/18 1956 131/86 - - - 65 -   03/07/18 1746 136/87 - - 69 - -   03/07/18 1533 128/80 - - - - -   03/07/18 1530 134/81 98.8  F (37.1  C) Oral 70 - -   03/07/18 1000 123/75 - - - 75 18     Uterine fundus is firm, non-tender and at the level of the umbilicus          Data:     All laboratory data related to this surgery reviewed  Hemoglobin   Date Value Ref Range Status   03/05/2018 Canceled, Test credited 11.7 - 15.7 g/dL Corrected     Comment:     Duplicate request  CORRECTED ON 03/05 AT 0923: PREVIOUSLY REPORTED AS 11.4     03/05/2018 11.4 (L) 11.7 - 15.7 g/dL Final   09/25/2015 11.7 11.7 - 15.7 g/dL Final   10/24/2013 10.1  (L) 11.7 - 15.7 g/dL Final   10/23/2013 11.2 (L) 11.7 - 15.7 g/dL Final     No imaging studies have been ordered    Parrish Whitaker MD

## 2019-08-09 ENCOUNTER — OFFICE VISIT (OUTPATIENT)
Dept: URGENT CARE | Facility: URGENT CARE | Age: 34
End: 2019-08-09
Payer: COMMERCIAL

## 2019-08-09 VITALS
RESPIRATION RATE: 14 BRPM | DIASTOLIC BLOOD PRESSURE: 80 MMHG | BODY MASS INDEX: 27.32 KG/M2 | HEIGHT: 66 IN | SYSTOLIC BLOOD PRESSURE: 112 MMHG | HEART RATE: 80 BPM | TEMPERATURE: 98.3 F | WEIGHT: 170 LBS

## 2019-08-09 DIAGNOSIS — K04.7 DENTAL INFECTION: Primary | ICD-10-CM

## 2019-08-09 DIAGNOSIS — K08.89 PAIN, DENTAL: ICD-10-CM

## 2019-08-09 PROCEDURE — 99213 OFFICE O/P EST LOW 20 MIN: CPT | Performed by: FAMILY MEDICINE

## 2019-08-09 RX ORDER — AMOXICILLIN 875 MG
875 TABLET ORAL 2 TIMES DAILY
Qty: 20 TABLET | Refills: 0 | Status: SHIPPED | OUTPATIENT
Start: 2019-08-09 | End: 2019-11-14

## 2019-08-09 RX ORDER — NAPROXEN SODIUM 220 MG
220 TABLET ORAL 2 TIMES DAILY WITH MEALS
Qty: 30 TABLET | Refills: 0 | Status: SHIPPED | OUTPATIENT
Start: 2019-08-09 | End: 2019-11-14

## 2019-08-09 RX ORDER — OMEGA-3 FATTY ACIDS/FISH OIL 300-1000MG
200 CAPSULE ORAL EVERY 4 HOURS PRN
COMMUNITY
End: 2019-12-16

## 2019-08-09 ASSESSMENT — MIFFLIN-ST. JEOR: SCORE: 1482.86

## 2019-08-10 NOTE — PROGRESS NOTES
"SUBJECTIVE:   Mark Cervantes is a 34 year old female presenting with   Chief Complaint   Patient presents with     Urgent Care     Mouth Problem     right side of mouth/face and ear pain since yesterday, may be tooth related.      Symptoms started yesterday morning.   She reports feeling like something was in her gums and trying to floss to get it out followed by swishing with mouth wash and not getting any relief.  Some bleeding with flossing. .  Then over course of the day that tooth on the upper left side started throbbing and her gum swelling up.  No drainage from the gums other than with the flossing.  Today the tooth still hurts and her right ear, side of the face and neck are aching now as well.  Eating only soft foods. Took ibuprofen yesterday and today with pain relief.  No fevers.   Has known broken crown that tooth and was supposed to have tooth removed, but hasn't done so yet.      ROS:  5-Point Review of Systems Negative-- Except as stated above.    OBJECTIVE  /80   Pulse 80   Temp 98.3  F (36.8  C) (Oral)   Resp 14   Ht 1.676 m (5' 6\")   Wt 77.1 kg (170 lb)   LMP 07/28/2019   Breastfeeding? No   BMI 27.44 kg/m    GENERAL:  Awake, alert and interactive. No acute distress.  HEENT:   NC/AT, EOMI, clear conjunctiva.  No facial swelling.  Nose clear.  Oropharynx moist and clear.  Fair dentition throughout.  2nd to last upper left molar is in poor condition with swelling of the gum surrounding this tooth and tenderness to palpation over the tooth and the gums.  No discharge noted.  Tooth is not loose.   Rest of gums look benign.  Right TM and EAC benign.  NECK: supple and free of adenopathy      ASSESSMENT/PLAN    ICD-10-CM    1. Dental infection K04.7 amoxicillin (AMOXIL) 875 MG tablet   2. Pain, dental K08.89 naproxen sodium (ANAPROX) 220 MG tablet     Damaged tooth with apparent infection.  Will start on abx tonight.  Pain relief with otc nsaids, will continue this and add oral numbing gel " prn.   F/u with dentist on Monday.   We discussed the expected course, medications, and symptomatic cares in detail.       Patient Instructions   Start the antibiotic tonight.    For your discomfort -- Tylenol, ibuprofen or aleve (take with food) and topical numbing gels for oral pain.  Gentle salt water swishes if desired.   Avoid hard to chew foods.   Follow up with your dentist.  If any new or worsening symptoms develop outside of office hours, follow up with an emergency/after hours dentist or ER as needed.

## 2019-08-10 NOTE — PATIENT INSTRUCTIONS
Start the antibiotic tonight.    For your discomfort -- Tylenol, ibuprofen or aleve (take with food) and topical numbing gels for oral pain.  Gentle salt water swishes if desired.   Avoid hard to chew foods.   Follow up with your dentist.  If any new or worsening symptoms develop outside of office hours, follow up with an emergency/after hours dentist or ER as needed.

## 2019-11-14 ENCOUNTER — OFFICE VISIT (OUTPATIENT)
Dept: OBGYN | Facility: CLINIC | Age: 34
End: 2019-11-14
Payer: COMMERCIAL

## 2019-11-14 VITALS
BODY MASS INDEX: 27.94 KG/M2 | WEIGHT: 178 LBS | SYSTOLIC BLOOD PRESSURE: 103 MMHG | HEIGHT: 67 IN | HEART RATE: 88 BPM | DIASTOLIC BLOOD PRESSURE: 71 MMHG

## 2019-11-14 DIAGNOSIS — Z00.00 VISIT FOR PREVENTIVE HEALTH EXAMINATION: Primary | ICD-10-CM

## 2019-11-14 LAB
CLUE CELLS: NEGATIVE
ERYTHROCYTE [DISTWIDTH] IN BLOOD BY AUTOMATED COUNT: 14.9 % (ref 10–15)
HCT VFR BLD AUTO: 37.6 % (ref 35–47)
HGB BLD-MCNC: 11.9 G/DL (ref 11.7–15.7)
MCH RBC QN AUTO: 25.4 PG (ref 26.5–33)
MCHC RBC AUTO-ENTMCNC: 31.6 G/DL (ref 31.5–36.5)
MCV RBC AUTO: 80 FL (ref 78–100)
PLATELET # BLD AUTO: 390 10E9/L (ref 150–450)
RBC # BLD AUTO: 4.69 10E12/L (ref 3.8–5.2)
TRICHOMONAS (WET PREP): NEGATIVE
TSH SERPL DL<=0.005 MIU/L-ACNC: 0.75 MU/L (ref 0.4–4)
WBC # BLD AUTO: 7.9 10E9/L (ref 4–11)
YEAST (WET PREP): NEGATIVE

## 2019-11-14 PROCEDURE — 87591 N.GONORRHOEAE DNA AMP PROB: CPT | Performed by: OBSTETRICS & GYNECOLOGY

## 2019-11-14 PROCEDURE — 84443 ASSAY THYROID STIM HORMONE: CPT | Performed by: OBSTETRICS & GYNECOLOGY

## 2019-11-14 PROCEDURE — 87210 SMEAR WET MOUNT SALINE/INK: CPT | Mod: ZF | Performed by: STUDENT IN AN ORGANIZED HEALTH CARE EDUCATION/TRAINING PROGRAM

## 2019-11-14 PROCEDURE — 36415 COLL VENOUS BLD VENIPUNCTURE: CPT | Performed by: OBSTETRICS & GYNECOLOGY

## 2019-11-14 PROCEDURE — 82306 VITAMIN D 25 HYDROXY: CPT | Performed by: OBSTETRICS & GYNECOLOGY

## 2019-11-14 PROCEDURE — 85027 COMPLETE CBC AUTOMATED: CPT | Performed by: OBSTETRICS & GYNECOLOGY

## 2019-11-14 PROCEDURE — 87491 CHLMYD TRACH DNA AMP PROBE: CPT | Performed by: OBSTETRICS & GYNECOLOGY

## 2019-11-14 PROCEDURE — G0463 HOSPITAL OUTPT CLINIC VISIT: HCPCS | Mod: ZF

## 2019-11-14 ASSESSMENT — ANXIETY QUESTIONNAIRES
1. FEELING NERVOUS, ANXIOUS, OR ON EDGE: NOT AT ALL
GAD7 TOTAL SCORE: 2
3. WORRYING TOO MUCH ABOUT DIFFERENT THINGS: NOT AT ALL
6. BECOMING EASILY ANNOYED OR IRRITABLE: SEVERAL DAYS
2. NOT BEING ABLE TO STOP OR CONTROL WORRYING: NOT AT ALL
7. FEELING AFRAID AS IF SOMETHING AWFUL MIGHT HAPPEN: NOT AT ALL
5. BEING SO RESTLESS THAT IT IS HARD TO SIT STILL: NOT AT ALL

## 2019-11-14 ASSESSMENT — PATIENT HEALTH QUESTIONNAIRE - PHQ9
5. POOR APPETITE OR OVEREATING: SEVERAL DAYS
SUM OF ALL RESPONSES TO PHQ QUESTIONS 1-9: 5

## 2019-11-14 ASSESSMENT — PAIN SCALES - GENERAL: PAINLEVEL: NO PAIN (0)

## 2019-11-14 ASSESSMENT — MIFFLIN-ST. JEOR: SCORE: 1527.09

## 2019-11-14 NOTE — LETTER
11/19/2019         Mark Cervantes   [address]  [place] [address]        Dear Ms. Cervantes:    The results of your recent test(s) listed below were normal.     Results for orders placed or performed in visit on 11/14/19   CBC with platelets     Status: Abnormal   Result Value Ref Range    WBC 7.9 4.0 - 11.0 10e9/L    RBC Count 4.69 3.8 - 5.2 10e12/L    Hemoglobin 11.9 11.7 - 15.7 g/dL    Hematocrit 37.6 35.0 - 47.0 %    MCV 80 78 - 100 fl    MCH 25.4 (L) 26.5 - 33.0 pg    MCHC 31.6 31.5 - 36.5 g/dL    RDW 14.9 10.0 - 15.0 %    Platelet Count 390 150 - 450 10e9/L   TSH with free T4 reflex     Status: None   Result Value Ref Range    TSH 0.75 0.40 - 4.00 mU/L   25- OH-Vitamin D     Status: None   Result Value Ref Range    Vitamin D Deficiency screening 36 20 - 75 ug/L   Wet Prep POCT     Status: None   Result Value Ref Range    Trichomonas Wet Prep Negative Negative    Clue cells Negative Negative    Yeast Wet Prep Negative Negative   Gonorrhea PCR     Status: None   Result Value Ref Range    Specimen Descrip Endocervical     N Gonorrhea PCR Negative NEG^Negative   Chlamydia trachomatis PCR (Clinic Collect)     Status: None   Result Value Ref Range    Specimen Description Endocervical     Chlamydia Trachomatis PCR Negative NEG^Negative         Please note that test explanations are brief and do not reflect all diagnostic uses.  If you have any questions or concerns, please call the clinic at [phone].  Follow up in clinic with Dr. Trejo as needed or if your symptoms continue.     Sincerely,      Estuardo Prado MD sent on behalf of  Tabitha Trejo MD

## 2019-11-14 NOTE — PROGRESS NOTES
Progress Note    SUBJECTIVE:  Mark Cervantes is an 34 year old, , who requests an Annual Preventive Exam.     Doing ok today, though generalized fatigue for the past few months. She is trying to get pregnant and a few months ago she had a period that was 12 days late, but after 12 days did have very heavy bleeding. Never had positive pregnancy test, but wondering if perhaps she miscarried because the bleeding seemed different. This was associated with a very bad headache, this has since resolved.      LMP 11/7 (7 days, consistent, at the beginning it's heavy). She has started to exercise a week ago, but doesn't think this has helped with her energy    Pt denies nausea/vomiting, chest pain and trouble breathing. She has had diarrhea for the past week, thinks she has been sick, but usually no diarrhea or constipation. Denies dyspareunia. Endorses vaginal itching and used hot water douching to clean which she hoped would help    Last  2015, NIL  History of abnormal Pap smear:   NO - age 30-65 PAP every 5 years with negative HPV co-testing recommended  She will be due next year    Mammogram current: not applicable    Last Colonoscopy: not applicable    HISTORY:  ibuprofen (ADVIL/MOTRIN) 200 MG capsule, Take 200 mg by mouth every 4 hours as needed for fever  Iron-Vitamin C (VITRON-C PO), Take by mouth daily  Prenat-Fe Carbonyl-FA-Omega 3 (ONE-A-DAY WOMENS PRENATAL 1 PO), Take by mouth daily    No current facility-administered medications on file prior to visit.     No Known Allergies  Immunization History   Administered Date(s) Administered     Influenza Vaccine IM > 6 months Valent IIV4 2015       OB History    Para Term  AB Living   4 4 4 0 0 4   SAB TAB Ectopic Multiple Live Births   0 0 0 0 4     Past Medical History:   Diagnosis Date     Anemia      Diabetes mellitus (H)     Gestational diabetes diet controlled     Tuberculosis     skin test + but chest xray ok     Past Surgical  "History:   Procedure Laterality Date     GYN SURGERY      ovarian cyst   s/p left salpingectomy      Family History   Problem Relation Age of Onset     Eye Surgery No family hx of      Social History     Socioeconomic History     Marital status:      Spouse name: None     Number of children: None     Years of education: None     Highest education level: None   Occupational History     None   Social Needs     Financial resource strain: None     Food insecurity:     Worry: None     Inability: None     Transportation needs:     Medical: None     Non-medical: None   Tobacco Use     Smoking status: Never Smoker     Smokeless tobacco: Never Used   Substance and Sexual Activity     Alcohol use: No     Drug use: No     Sexual activity: Yes     Partners: Male   Lifestyle     Physical activity:     Days per week: None     Minutes per session: None     Stress: None   Relationships     Social connections:     Talks on phone: None     Gets together: None     Attends Cheondoism service: None     Active member of club or organization: None     Attends meetings of clubs or organizations: None     Relationship status: None     Intimate partner violence:     Fear of current or ex partner: None     Emotionally abused: None     Physically abused: None     Forced sexual activity: None   Other Topics Concern     None   Social History Narrative     None       ROS  ROS is per HPI, all else negative    PHQ-9 SCORE 11/14/2019   PHQ-9 Total Score 5     ARMANDO-7 SCORE 11/14/2019   Total Score 2         EXAM:  Blood pressure 103/71, pulse 88, height 1.689 m (5' 6.5\"), weight 80.7 kg (178 lb), last menstrual period 11/07/2019, not currently breastfeeding. Body mass index is 28.3 kg/m .  General - pleasant female in no acute distress.  Skin - no suspicious lesions or rashes  EENT-  PERRLA, euthyroid with out palpable nodules  Neck - supple without lymphadenopathy.  Lungs - clear to auscultation bilaterally.  Heart - regular rate and rhythm " without murmur.  Abdomen - soft, nontender, nondistended, no masses or organomegaly noted.  Musculoskeletal - no gross deformities.  Neurological - normal strength, sensation, and mental status.    Breast Exam:  Breast: Without visible skin changes. No dimpling or lesions seen.   Breasts non-tender with palpation, no dominant mass, nodularity, or nipple discharge noted bilaterally. Axillary nodes negative.      Pelvic Exam:  EG/BUS: Normal genital architecture without lesions, erythema or abnormal secretions Bartholin's, Urethra, [place]'s normal   Urethral meatus: normal   Urethra: no masses, tenderness, or scarring   Bladder: no masses or tenderness   Vagina: moist, pink, rugae with clear secretions. Wet prep and gc/ct collected  Cervix: Multiparous, and no lesions, small ectropion   Uterus: anteverted, and midposition,  Adnexa: Within normal limits and No masses, nodularity, tenderness  Rectum:anus normal       ASSESSMENT:  Encounter Diagnosis   Name Primary?     Visit for preventive health examination Yes   34 year old  here for annual exam. With new vaginal itching. Attempting to get pregnant. New fatigue    PLAN:   Orders Placed This Encounter   Procedures     CBC with platelets     TSH with free T4 reflex     25- OH-Vitamin D     Wet Prep POCT     Wet prep negative. Encouraged patient to avoid douching and discussed that this is likely the cause of her discomfort. Discussed vulvovaginal hygiene    Encouraged pt to continue prenatal vitamin and PO Fe supplementation if attempting pregnancy. Previously patient required Fe Infusion.    Due to fatigue, will order CBC, TSH.     Patient would like results forwarded to Health system when available. S/p wet prep discussion    Return to clinic in one year.  Follow-up as needed.    Patient discussed with Dr Bermudez.     Tabitha Trejo MD  Obstetrics and Gynecology, PGY-3  2019 ,    The patient was seen in resident continuity clinic by Dr. Edwards.  I  have reviewed the history and exam, the assessment and plan were jointly made.     Carie Bermudez MD, FACOG

## 2019-11-14 NOTE — PATIENT INSTRUCTIONS
Nice to meet you today!  Follow up at the lab after the visit to test blood   Will send [facility] message with results!      PREVENTIVE HEALTH RECOMMENDATIONS:   Most women need a yearly breast and pelvic exam.    A PAP screen, a test done DURING a pelvic exam, is NO longer recommended yearly.    March 2013, screening guidelines recommended by ACOG for PAP screen are:    1) First pap at age 21.    2) Pap every 3 years until age 30.    3) After age 30, pap every 3 years or Pap with HR HPV screen every 5 years until age 65.  4) Women do NOT need a vaginal Pap screen after a hysterectomy (surgical removal of the uterus) when they have not had cancer.    Exceptions:  1) Yearly pap if HIV+ or immunosuppressed secondary to organ transplant  2) ZACHERY II-III continue routine screening for 20 years.    I encourage you continue looking for opportunities to choose a healthy lifestyle:       * Choose to eat a heart healthy diet. Check out the FOOD PLATE guidelines at: http://www.[place] World Airwaysplate.gov/ for helpful hints on weight and cholesterol management.  Balance your caloric intake with exercise to maintain a BMI in the 22 to 26 range. For bone health: Eat calcium-rich foods like yogurt, broccoli or take chewable calcium pills (500 to 600 mg) twice a day with food.       * Exercise for at least an average of 30 minutes a day, 5 days of the week. This will help you control your weight, release stress, and help prevent disease.      * Take a Vitamin D3 supplement daily fall through spring and during summer unless you [id number]' full body sun exposure to skin without sunscreen.      * DO wear sunscreen to prevent skin cancer after the first 15-30 minutes.      * Identify stressors in your life, find ways to release the stress, and, make time for yourself. PLEASE ask for help if mood changes last longer than two weeks.     * Limit alcohol to one drink per day.  No smoking.  Avoid second hand smoke. If you smoke, ask for help to stop.        *  If you are in a sexual relationship, talk with your partner about possible infection risks and take action to protect yourself from exposure to a sexual infection.    Please request an infection screen for STIs (sexually transmitted infections) if you are less than age 26 OR believe that you may be at risk.     Get a flu shot each year. Get a tetanus shot every 10 years. EVERYONE needs a pertussis (Whooping cough) booster.    See your dentist twice a year for an exam and preventive care cleaning.     Consider the following screen tests:    1) cholesterol test every 5 years.     2) yearly mammogram after age 40 unless you have identified risks.    3) colonoscopy every 10 years after age 50 unless you have identified risks.    4) diabetes blood test screening if you are at risk for diabetes.      Additional information that you may also find helpful:  The Internet now gives us access to LOTS of information -- some of it helpful, research documented and also plenty of harmful, anecdotal information that may not pertain to your situtaion. Consider visiting the following websites for accurate health information:    www.vitamindcouncil.org/ : Info and ongoing research re Vitamin D    www.fairview.org : Up to date and easily searchable information on multiple topics.    www.medlineplus.gov : medication info, interactive tutorials, watch real surgeries online    www.cdc.gov : public health info, travel advisories, epidemics (H1N1)    www.[place]/std.org: current research re diagnosis, treatment and prevention of sexually contacted infections.    www.health.state.[place] : [place] dept of heatl, public health issues in [place], N1N1    www.familydoctor.org : good info from the Academy of Family Physicians

## 2019-11-15 LAB
C TRACH DNA SPEC QL NAA+PROBE: NEGATIVE
DEPRECATED CALCIDIOL+CALCIFEROL SERPL-MC: 36 UG/L (ref 20–75)
N GONORRHOEA DNA SPEC QL NAA+PROBE: NEGATIVE
SPECIMEN SOURCE: NORMAL
SPECIMEN SOURCE: NORMAL

## 2019-11-15 ASSESSMENT — ANXIETY QUESTIONNAIRES: GAD7 TOTAL SCORE: 2

## 2019-12-16 ENCOUNTER — OFFICE VISIT (OUTPATIENT)
Dept: OBGYN | Facility: CLINIC | Age: 34
End: 2019-12-16
Attending: OBSTETRICS & GYNECOLOGY
Payer: COMMERCIAL

## 2019-12-16 VITALS
WEIGHT: 186.9 LBS | HEART RATE: 67 BPM | HEIGHT: 67 IN | BODY MASS INDEX: 29.34 KG/M2 | DIASTOLIC BLOOD PRESSURE: 65 MMHG | SYSTOLIC BLOOD PRESSURE: 99 MMHG

## 2019-12-16 DIAGNOSIS — N76.0 VAGINITIS AND VULVOVAGINITIS: Primary | ICD-10-CM

## 2019-12-16 LAB
ALBUMIN UR-MCNC: NEGATIVE MG/DL
APPEARANCE UR: CLEAR
BILIRUB UR QL STRIP: NEGATIVE
COLOR UR AUTO: YELLOW
GLUCOSE UR STRIP-MCNC: NEGATIVE MG/DL
HGB UR QL STRIP: NEGATIVE
KETONES UR STRIP-MCNC: NEGATIVE MG/DL
LEUKOCYTE ESTERASE UR QL STRIP: ABNORMAL
NITRATE UR QL: NEGATIVE
PH UR STRIP: 5.5 PH (ref 5–7)
SP GR UR STRIP: 1.02 (ref 1–1.03)
UROBILINOGEN UR STRIP-ACNC: 0.2 EU/DL (ref 0.2–1)

## 2019-12-16 PROCEDURE — G0463 HOSPITAL OUTPT CLINIC VISIT: HCPCS | Mod: ZF

## 2019-12-16 PROCEDURE — 81003 URINALYSIS AUTO W/O SCOPE: CPT

## 2019-12-16 PROCEDURE — 87591 N.GONORRHOEAE DNA AMP PROB: CPT | Performed by: OBSTETRICS & GYNECOLOGY

## 2019-12-16 PROCEDURE — 87491 CHLMYD TRACH DNA AMP PROBE: CPT | Performed by: OBSTETRICS & GYNECOLOGY

## 2019-12-16 PROCEDURE — 87086 URINE CULTURE/COLONY COUNT: CPT | Performed by: OBSTETRICS & GYNECOLOGY

## 2019-12-16 RX ORDER — MICONAZOLE NITRATE 2 %
1 CREAM WITH APPLICATOR VAGINAL AT BEDTIME
Qty: 45 G | Refills: 0 | Status: SHIPPED | OUTPATIENT
Start: 2019-12-16 | End: 2019-12-23

## 2019-12-16 ASSESSMENT — MIFFLIN-ST. JEOR: SCORE: 1567.46

## 2019-12-16 ASSESSMENT — ANXIETY QUESTIONNAIRES
6. BECOMING EASILY ANNOYED OR IRRITABLE: NOT AT ALL
1. FEELING NERVOUS, ANXIOUS, OR ON EDGE: NOT AT ALL
IF YOU CHECKED OFF ANY PROBLEMS ON THIS QUESTIONNAIRE, HOW DIFFICULT HAVE THESE PROBLEMS MADE IT FOR YOU TO DO YOUR WORK, TAKE CARE OF THINGS AT HOME, OR GET ALONG WITH OTHER PEOPLE: NOT DIFFICULT AT ALL
GAD7 TOTAL SCORE: 0
3. WORRYING TOO MUCH ABOUT DIFFERENT THINGS: NOT AT ALL
5. BEING SO RESTLESS THAT IT IS HARD TO SIT STILL: NOT AT ALL
7. FEELING AFRAID AS IF SOMETHING AWFUL MIGHT HAPPEN: NOT AT ALL
2. NOT BEING ABLE TO STOP OR CONTROL WORRYING: NOT AT ALL

## 2019-12-16 ASSESSMENT — PATIENT HEALTH QUESTIONNAIRE - PHQ9
5. POOR APPETITE OR OVEREATING: NOT AT ALL
SUM OF ALL RESPONSES TO PHQ QUESTIONS 1-9: 0

## 2019-12-16 NOTE — NURSING NOTE
Chief Complaint   Patient presents with     Dysuria     Possible UTI or vaginal infection. Is having burning in her genital area.       See KYRIE Brennan 12/16/2019

## 2019-12-16 NOTE — LETTER
1/10/2020         Mark Cervantes   [address]  [place] [address]        Dear Ms. Cervantes:    The results of your recent test(s) listed below were normal.     Results for orders placed or performed in visit on 12/16/19   Clinitek Urine Macroscopic POCT     Status: Abnormal   Result Value Ref Range    Color Urine Yellow     Appearance Urine Clear     Glucose Urine Negative NEG^Negative mg/dL    Bilirubin Urine Negative NEG^Negative    Ketones Urine Negative NEG^Negative mg/dL    Specific Gravity Urine 1.020 1.003 - 1.035    Blood Urine Negative NEG^Negative    pH Urine 5.5 5.0 - 7.0 pH    Protein Albumin Urine Negative NEG^Negative mg/dL    Urobilinogen Urine 0.2 0.2 - 1.0 EU/dL    Nitrite Urine Negative NEG^Negative    Leukocyte Esterase Urine Small (A) NEG^Negative   Chlamydia PCR (Clinic Collect)     Status: None   Result Value Ref Range    Specimen Description Cervix     Chlamydia Trachomatis PCR Negative NEG^Negative   Gonorrhea PCR     Status: None   Result Value Ref Range    Specimen Descrip Cervix     N Gonorrhea PCR Negative NEG^Negative   Urine Culture Aerobic Bacterial     Status: None   Result Value Ref Range    Specimen Description Unspecified Urine     Special Requests Specimen received in preservative     Culture Micro       <10,000 colonies/mL  mixed urogenital juwan  Susceptibility testing not routinely done           Please note that test explanations are brief and do not reflect all diagnostic uses.  If you have any questions or concerns, please call the clinic at [phone].      Sincerely,      Shira Richards MD

## 2019-12-16 NOTE — PROGRESS NOTES
"Notes significant vaginal discharge. Wonders if yeast. Not itching but painful and pain with urination. No concern for STI.     BP 99/65 (BP Location: Left arm, Patient Position: Chair)   Pulse 67   Ht 1.689 m (5' 6.5\")   Wt 84.8 kg (186 lb 14.4 oz)   LMP 12/01/2019   Breastfeeding No   BMI 29.71 kg/m       Gen: AA nad  GYN: white discharge over clitoris and labia.    Spec exam: White discharge in vaginal vault, SWABS COLLECTED    Wet Prep: + yeast, neg Trich or BV.      35 yo with yeast vaginitis.      Orders Placed This Encounter   Procedures     Clinitek Urine Macroscopic POCT     Wet Prep POCT     Monistat rx sent.   Gc/CH ordered   F/u prn.    Shira Richards MD, FACOG  [facility]  OB/GYN    12/20/2019  12:56 PM            "

## 2019-12-16 NOTE — LETTER
"12/16/2019    RE: Mark Cervantes  [address]  [place] [address]     Dear Colleague,    Thank you for referring your patient, Mark Cervantes, to the [facility] at [facility]. Please see a copy of my visit note below.    Notes significant vaginal discharge. Wonders if yeast. Not itching but painful and pain with urination. No concern for STI.     BP 99/65 (BP Location: Left arm, Patient Position: Chair)   Pulse 67   Ht 1.689 m (5' 6.5\")   Wt 84.8 kg (186 lb 14.4 oz)   LMP 12/01/2019   Breastfeeding No   BMI 29.71 kg/m        Gen: AA nad  GYN: white discharge over clitoris and labia.    Spec exam: White discharge in vaginal vault, SWABS COLLECTED    Wet Prep: + yeast, neg Trich or BV.      33 yo with yeast vaginitis.  Orders Placed This Encounter   Procedures     Clinitek Urine Macroscopic POCT     Wet Prep POCT     Monistat rx sent.   Gc/CH ordered   F/u prn.    Shira Richards MD, FACOG  [facility]  OB/GYN  12/20/2019  12:56 PM    "

## 2019-12-17 LAB
BACTERIA SPEC CULT: NORMAL
C TRACH DNA SPEC QL NAA+PROBE: NEGATIVE
Lab: NORMAL
N GONORRHOEA DNA SPEC QL NAA+PROBE: NEGATIVE
SPECIMEN SOURCE: NORMAL

## 2019-12-17 ASSESSMENT — ANXIETY QUESTIONNAIRES: GAD7 TOTAL SCORE: 0

## 2020-03-31 ENCOUNTER — ANCILLARY PROCEDURE (OUTPATIENT)
Dept: ULTRASOUND IMAGING | Facility: CLINIC | Age: 35
End: 2020-03-31
Attending: ADVANCED PRACTICE MIDWIFE
Payer: COMMERCIAL

## 2020-03-31 ENCOUNTER — TRANSCRIBE ORDERS (OUTPATIENT)
Dept: OBGYN | Facility: CLINIC | Age: 35
End: 2020-03-31

## 2020-03-31 ENCOUNTER — OFFICE VISIT (OUTPATIENT)
Dept: OBGYN | Facility: CLINIC | Age: 35
End: 2020-03-31
Attending: ADVANCED PRACTICE MIDWIFE
Payer: COMMERCIAL

## 2020-03-31 VITALS
HEIGHT: 67 IN | SYSTOLIC BLOOD PRESSURE: 107 MMHG | DIASTOLIC BLOOD PRESSURE: 72 MMHG | BODY MASS INDEX: 31.2 KG/M2 | HEART RATE: 87 BPM | WEIGHT: 198.8 LBS

## 2020-03-31 DIAGNOSIS — Z86.32 HISTORY OF GESTATIONAL DIABETES MELLITUS (GDM): ICD-10-CM

## 2020-03-31 DIAGNOSIS — Z86.79 HISTORY OF HYPERTENSION: ICD-10-CM

## 2020-03-31 DIAGNOSIS — O26.90 PREGNANCY RELATED CONDITION, ANTEPARTUM: Primary | ICD-10-CM

## 2020-03-31 DIAGNOSIS — O09.90 HIGH RISK PREGNANCY, ANTEPARTUM: ICD-10-CM

## 2020-03-31 DIAGNOSIS — Z34.91 ENCOUNTER FOR SUPERVISION OF NORMAL PREGNANCY IN FIRST TRIMESTER, UNSPECIFIED GRAVIDITY: ICD-10-CM

## 2020-03-31 DIAGNOSIS — O09.92 HRP (HIGH RISK PREGNANCY), SECOND TRIMESTER: Primary | ICD-10-CM

## 2020-03-31 DIAGNOSIS — Z36.89 ENCOUNTER FOR FETAL ANATOMIC SURVEY: ICD-10-CM

## 2020-03-31 DIAGNOSIS — R76.8 HEPATITIS C ANTIBODY TEST POSITIVE: ICD-10-CM

## 2020-03-31 PROBLEM — Z3A.28 28 WEEKS GESTATION OF PREGNANCY: Status: RESOLVED | Noted: 2017-12-22 | Resolved: 2020-03-31

## 2020-03-31 PROBLEM — Z34.90 PREGNANCY: Status: RESOLVED | Noted: 2018-03-05 | Resolved: 2020-03-31

## 2020-03-31 LAB
ABO + RH BLD: NORMAL
ABO + RH BLD: NORMAL
ALT SERPL W P-5'-P-CCNC: 19 U/L (ref 0–50)
ANION GAP SERPL CALCULATED.3IONS-SCNC: 9 MMOL/L (ref 3–14)
AST SERPL W P-5'-P-CCNC: 20 U/L (ref 0–45)
BASOPHILS # BLD AUTO: 0 10E9/L (ref 0–0.2)
BASOPHILS NFR BLD AUTO: 0.2 %
BLD GP AB SCN SERPL QL: NORMAL
BLOOD BANK CMNT PATIENT-IMP: NORMAL
BUN SERPL-MCNC: 6 MG/DL (ref 7–30)
CALCIUM SERPL-MCNC: 8.7 MG/DL (ref 8.5–10.1)
CHLORIDE SERPL-SCNC: 107 MMOL/L (ref 94–109)
CO2 SERPL-SCNC: 22 MMOL/L (ref 20–32)
CREAT SERPL-MCNC: 0.34 MG/DL (ref 0.52–1.04)
CREAT UR-MCNC: 60 MG/DL
DEPRECATED CALCIDIOL+CALCIFEROL SERPL-MC: 35 UG/L (ref 20–75)
DIFFERENTIAL METHOD BLD: ABNORMAL
EOSINOPHIL # BLD AUTO: 0.5 10E9/L (ref 0–0.7)
EOSINOPHIL NFR BLD AUTO: 4.3 %
ERYTHROCYTE [DISTWIDTH] IN BLOOD BY AUTOMATED COUNT: 14.8 % (ref 10–15)
GFR SERPL CREATININE-BSD FRML MDRD: >90 ML/MIN/{1.73_M2}
GLUCOSE SERPL-MCNC: 90 MG/DL (ref 70–99)
HBA1C MFR BLD: 5.6 % (ref 0–5.6)
HBV SURFACE AB SERPL IA-ACNC: 1.61 M[IU]/ML
HBV SURFACE AG SERPL QL IA: NONREACTIVE
HCT VFR BLD AUTO: 33.4 % (ref 35–47)
HCV AB SERPL QL IA: REACTIVE
HGB BLD-MCNC: 11 G/DL (ref 11.7–15.7)
HIV 1+2 AB+HIV1 P24 AG SERPL QL IA: NONREACTIVE
IMM GRANULOCYTES # BLD: 0.1 10E9/L (ref 0–0.4)
IMM GRANULOCYTES NFR BLD: 0.7 %
LYMPHOCYTES # BLD AUTO: 2.6 10E9/L (ref 0.8–5.3)
LYMPHOCYTES NFR BLD AUTO: 24 %
MCH RBC QN AUTO: 26.5 PG (ref 26.5–33)
MCHC RBC AUTO-ENTMCNC: 32.9 G/DL (ref 31.5–36.5)
MCV RBC AUTO: 81 FL (ref 78–100)
MONOCYTES # BLD AUTO: 0.6 10E9/L (ref 0–1.3)
MONOCYTES NFR BLD AUTO: 5.3 %
NEUTROPHILS # BLD AUTO: 7 10E9/L (ref 1.6–8.3)
NEUTROPHILS NFR BLD AUTO: 65.5 %
NRBC # BLD AUTO: 0 10*3/UL
NRBC BLD AUTO-RTO: 0 /100
PLATELET # BLD AUTO: 230 10E9/L (ref 150–450)
POTASSIUM SERPL-SCNC: 3.8 MMOL/L (ref 3.4–5.3)
PROT UR-MCNC: 0.08 G/L
PROT/CREAT 24H UR: 0.13 G/G CR (ref 0–0.2)
RBC # BLD AUTO: 4.15 10E12/L (ref 3.8–5.2)
SODIUM SERPL-SCNC: 138 MMOL/L (ref 133–144)
SPECIMEN EXP DATE BLD: NORMAL
T PALLIDUM AB SER QL: NONREACTIVE
URATE SERPL-MCNC: 3 MG/DL (ref 2.6–6)
WBC # BLD AUTO: 10.7 10E9/L (ref 4–11)

## 2020-03-31 PROCEDURE — 86762 RUBELLA ANTIBODY: CPT | Performed by: ADVANCED PRACTICE MIDWIFE

## 2020-03-31 PROCEDURE — 82306 VITAMIN D 25 HYDROXY: CPT | Performed by: ADVANCED PRACTICE MIDWIFE

## 2020-03-31 PROCEDURE — 83036 HEMOGLOBIN GLYCOSYLATED A1C: CPT | Performed by: ADVANCED PRACTICE MIDWIFE

## 2020-03-31 PROCEDURE — 76801 OB US < 14 WKS SINGLE FETUS: CPT

## 2020-03-31 PROCEDURE — 80048 BASIC METABOLIC PNL TOTAL CA: CPT | Performed by: ADVANCED PRACTICE MIDWIFE

## 2020-03-31 PROCEDURE — 86901 BLOOD TYPING SEROLOGIC RH(D): CPT | Performed by: ADVANCED PRACTICE MIDWIFE

## 2020-03-31 PROCEDURE — 87340 HEPATITIS B SURFACE AG IA: CPT | Performed by: ADVANCED PRACTICE MIDWIFE

## 2020-03-31 PROCEDURE — 86780 TREPONEMA PALLIDUM: CPT | Performed by: ADVANCED PRACTICE MIDWIFE

## 2020-03-31 PROCEDURE — 36415 COLL VENOUS BLD VENIPUNCTURE: CPT | Performed by: ADVANCED PRACTICE MIDWIFE

## 2020-03-31 PROCEDURE — 87522 HEPATITIS C REVRS TRNSCRPJ: CPT | Performed by: ADVANCED PRACTICE MIDWIFE

## 2020-03-31 PROCEDURE — 87389 HIV-1 AG W/HIV-1&-2 AB AG IA: CPT | Performed by: ADVANCED PRACTICE MIDWIFE

## 2020-03-31 PROCEDURE — 86850 RBC ANTIBODY SCREEN: CPT | Performed by: ADVANCED PRACTICE MIDWIFE

## 2020-03-31 PROCEDURE — 87086 URINE CULTURE/COLONY COUNT: CPT | Performed by: ADVANCED PRACTICE MIDWIFE

## 2020-03-31 PROCEDURE — 86803 HEPATITIS C AB TEST: CPT | Performed by: ADVANCED PRACTICE MIDWIFE

## 2020-03-31 PROCEDURE — 84550 ASSAY OF BLOOD/URIC ACID: CPT | Performed by: ADVANCED PRACTICE MIDWIFE

## 2020-03-31 PROCEDURE — 84460 ALANINE AMINO (ALT) (SGPT): CPT | Performed by: ADVANCED PRACTICE MIDWIFE

## 2020-03-31 PROCEDURE — 84156 ASSAY OF PROTEIN URINE: CPT | Performed by: ADVANCED PRACTICE MIDWIFE

## 2020-03-31 PROCEDURE — 84450 TRANSFERASE (AST) (SGOT): CPT | Performed by: ADVANCED PRACTICE MIDWIFE

## 2020-03-31 PROCEDURE — G0463 HOSPITAL OUTPT CLINIC VISIT: HCPCS | Mod: 25,ZF

## 2020-03-31 PROCEDURE — 86900 BLOOD TYPING SEROLOGIC ABO: CPT | Performed by: ADVANCED PRACTICE MIDWIFE

## 2020-03-31 PROCEDURE — 85025 COMPLETE CBC W/AUTO DIFF WBC: CPT | Performed by: ADVANCED PRACTICE MIDWIFE

## 2020-03-31 PROCEDURE — 86706 HEP B SURFACE ANTIBODY: CPT | Performed by: ADVANCED PRACTICE MIDWIFE

## 2020-03-31 ASSESSMENT — MIFFLIN-ST. JEOR: SCORE: 1621.44

## 2020-03-31 NOTE — PROGRESS NOTES
S OB Intake  and NOB Visit  SUBJECTIVE  34 year old woman presents to clinic for initiation of OB care andnew OB visit.   Patient's last menstrual period was 2019.  at 17w2d by Estimated Date of Delivery: Sep 6, 2020 based on LMP.   - Reviewed dating ultrasound . Pregnancy is planned.   Feels well. Has started PNV.      She has not had bleeding since her LMP.   She has not had nausea. Weight loss has not occurred.      OTHER CONCERNS: none      GENETICS  - Genetic/Infection questionnaire completed, risks include: None. Pt  does not have a recent known exposure to Parvo or CMV so IgG/IgM testing WILL NOT be ordered.   Have you traveled during the pregnancy?No  Have your sexual partner(s) travelled during the pregnancy?No      - Current Medications    Current Outpatient Medications   Medication Sig Dispense Refill     Prenat-Fe Carbonyl-FA-Omega 3 (ONE-A-DAY WOMENS PRENATAL 1 PO) Take by mouth daily       VITAMIN D PO            - Co-morbids    Past Medical History:   Diagnosis Date     Anemia      Diabetes mellitus (H)     Gestational diabetes diet controlled     Tuberculosis     skin test + but chest xray ok       - Risk for GDM -  has Personal history of GDM WILL NOT have an early GCT due to gestational age, agrees to  Hgb A1C    - High Risk for Pre E-  Has History of Pre Eclampsia so WILL start low dose aspirin (81mg) starting between 12 and 28 weeks to prevent early onset preeclampsia.       The patient  does not have a history of spontaneous  birth so  WILL NOT consider progesterone starting at 16-20 weeks and/or serial transvaginal cervical length ultrasounds from 16-24 weeks.     -The patient does not have a history of immunosuppresion or HIV so Toxoplasma IgG/IgM WILL NOT be ordered.    PERSONAL/SOCIAL HISTORY  Partner is involved,     lives with their family.  Employment: Full time. Her job involves light activity works as [employer] at [employer]  History of anxiety or depression   no  Additional items: None      PSYCHIATRIC:  Denies mood changes.    PHQ-9 score:    PHQ-9 SCORE 12/16/2019   PHQ-9 Total Score 0     ARMANDO-7 SCORE 11/14/2019 12/16/2019   Total Score 2 0         Past History:  Her past medical history   Past Medical History:   Diagnosis Date     Anemia      Diabetes mellitus (H)     Gestational diabetes diet controlled     Tuberculosis     skin test + but chest xray ok   .   She has a history of  hypertension and gestational diabetes, diet controlled  Since her last LMP she denies use of alcohol, tobacco and street drugs.  HISTORY:  Family History   Problem Relation Age of Onset     Eye Surgery No family hx of      Social History     Socioeconomic History     Marital status:      Spouse name: Not on file     Number of children: Not on file     Years of education: Not on file     Highest education level: Not on file   Occupational History     Not on file   Social Needs     Financial resource strain: Not on file     Food insecurity     Worry: Not on file     Inability: Not on file     Transportation needs     Medical: Not on file     Non-medical: Not on file   Tobacco Use     Smoking status: Never Smoker     Smokeless tobacco: Never Used   Substance and Sexual Activity     Alcohol use: No     Drug use: No     Sexual activity: Yes     Partners: Male   Lifestyle     Physical activity     Days per week: Not on file     Minutes per session: Not on file     Stress: Not on file   Relationships     Social connections     Talks on phone: Not on file     Gets together: Not on file     Attends Quaker service: Not on file     Active member of club or organization: Not on file     Attends meetings of clubs or organizations: Not on file     Relationship status: Not on file     Intimate partner violence     Fear of current or ex partner: Not on file     Emotionally abused: Not on file     Physically abused: Not on file     Forced sexual activity: Not on file   Other Topics Concern      "Not on file   Social History Narrative     Not on file     Current Outpatient Medications   Medication Sig     Prenat-Fe Carbonyl-FA-Omega 3 (ONE-A-DAY WOMENS PRENATAL 1 PO) Take by mouth daily     VITAMIN D PO      No current facility-administered medications for this visit.      No Known Allergies    ============================================  MEDICAL HISTORY  Past Medical History:   Diagnosis Date     Anemia      Diabetes mellitus (H)     Gestational diabetes diet controlled     Tuberculosis     skin test + but chest xray ok     Past Surgical History:   Procedure Laterality Date     GYN SURGERY      ovarian cyst       OB History    Para Term  AB Living   5 4 4 0 0 4   SAB TAB Ectopic Multiple Live Births   0 0 0 0 4      # Outcome Date GA Lbr Bryce/2nd Weight Sex Delivery Anes PTL Lv   5 Current            4 Term 18 41w1d 02:00 / 00:19 3.78 kg (8 lb 5.3 oz) M Vag-Spont EPI N JIM      Name: AMANDA GARCES1 HAMDI      Apgar1: 9  Apgar5: 9   3 Term 09/25/15 39w3d 03:30 / 00:11 3.74 kg (8 lb 3.9 oz) F Vag-Spont EPI  JIM      Apgar1: 6  Apgar5: 9   2 Term 10/23/13 39w4d 05:40 / 00:38 3.465 kg (7 lb 10.2 oz) F Vag-Spont EPI  JIM      Name: ROSENDO GARCES I      Apgar1: 9  Apgar5: 9   1 Term 12 39w6d 15:00 / 04:33 3.459 kg (7 lb 10 oz) F Vag-Vacuum EPI, Local N JIM      Name: ROSENDO GARCESDI      Apgar1: 8  Apgar5: 9      Obstetric Comments   Hx GDM with all pregnancies.  HTN after delivery in 2018-required medication.  No PPH.            GYN History- Denies Abnormal Pap Smears                        Cervical procedures: denies                        History of STI: denies    I personally reviewed the past social/family/medical and surgical history on the date of service.       ROS: 10 point ROS neg other than the symptoms noted above in the HPI.    Objective  /72 (BP Location: Left arm, Patient Position: Chair)   Pulse 87   Ht 1.689 m (5' 6.5\")   Wt 90.2 kg (198 lb 12.8 oz)   LMP " 2019   BMI 31.61 kg/m     EXAM:  GENERAL:  Pleasant pregnant female, alert, cooperative and well groomed.  SKIN:  Warm and dry, without lesions or rashes  HEAD: Symmetrical features.  MOUTH:  Buccal mucosa pink, moist without lesions.  Teeth in good repair.    NECK:  Thyroid without enlargement and nodules.  Lymph nodes not palpable.   LUNGS:  Clear to auscultation.   HEART:  RRR without murmur.  ABDOMEN: Soft without masses , tenderness or organomegaly.  No CVA tenderness.  Uterus 18cm at size equal to dates.  No scars noted.. Fetal heart tones present.  MUSCULOSKELETAL:  Full range of motion  EXTREMITIES:  No edema. No significant varicosities.   PELVIC EXAM:  GENITALIA: deferred    GC/CHLAMYDIA CULTURE OBTAINED:needs to be collected at next visit    No results found for: PAP     Assessment/Plan  34 year old , 17w2d weeks of pregnancy with KARISSA of Sep 6, 2020 by US confirms  Intrauterine pregnancy 17w2d size consistent with dates  Genetic Screening: Level 2 Ultrasound only    Orders Placed This Encounter   Procedures     25- OH-Vitamin D     CBC with Platelets Differential     Hepatitis B Surface Antigen     HIV Antigen Antibody Combo     Rubella Antibody IgG Quantitative     Treponema Abs w Reflex to RPR and Titer     Hepatitis C antibody     Hgb A1c     Hepatitis B Surface Antibody     ALT     AST     Basic metabolic panel     Uric acid     Protein  random urine with Creat Ratio     [facility] REFERRAL-PREGNANCY     ABO/Rh Type and Screen       - Oriented to Practice, types of care, and how to reach a provider.  Pt prefers MD team  - Patient received 1st trimester new OB education packet complete with aide of The Expectant Family booklet including information on genetic screening test options.  - Patient desires level II ultrasound which was ordered.    - Educational handout on the prevention of infections diseases during pregnancy provided.  - Reviewed healthy diet and foods to avoid,  exercise and activity during pregnancy; avoiding exposure to toxoplasmosis; and maintenance of a generally healthy lifestyle.   - Discussed the harms, benefits, side effects and alternative therapies for current prescribed and OTC medications. Patient was encouraged to start prenatal vitamins as tolerated.    - Reviewed use of triage nurse line and contacting the on-call provider after hours for an urgent need such as fever, vagina bleeding, bladder or vaginal infection, rupture of membranes,  or term labor.      - Reviewed best evidence for: weight gain for her weight and height for pregnancy:  Based on pre-pregnancy Body mass index is 31.61 kg/m . RECOMMENDED WEIGHT GAIN: < 15 lbs.    - Patient was sent to lab for routine OB labs including Hgb A1c and baseline pre-e labs.   - Reviewed high risk for gestational diabetes d/t Personal history of GDM, early 1hr not indicated at 17w.  Hgb A1c today  - Reviewed High risk for Pre Eclampsia d/t History of Pre Eclampsia  and ISagreeable to starting 81mg aspirin daily after 12 weeks .        - Pregnancy concerns to be addressed by provider at next OB visit include: history of gestational diabetes and history of preeclampsia.  - Instructed on iron supplement regimen.   - All pt questions discussed and answered.  Pt verbalized understanding of and agreement to plan of care.       - Continue scheduled prenatal care and prn if questions or concerns    HERBERT Santana CNM

## 2020-03-31 NOTE — LETTER
3/31/2020       RE: Mark Cervantes  [address]  [place] [address]     Dear Colleague,    Thank you for referring your patient, Mark Cervantes, to the [facility]. Please see a copy of my visit note below.      [facility] OB Intake  and NOB Visit  SUBJECTIVE  34 year old woman presents to clinic for initiation of OB care andnew OB visit.   Patient's last menstrual period was 2019.  at 17w2d by Estimated Date of Delivery: Sep 6, 2020 based on LMP.   - Reviewed dating ultrasound . Pregnancy is planned.   Feels well. Has started PNV.      She has not had bleeding since her LMP.   She has not had nausea. Weight loss has not occurred.      OTHER CONCERNS: none      GENETICS  - Genetic/Infection questionnaire completed, risks include: None. Pt  does not have a recent known exposure to Parvo or CMV so IgG/IgM testing WILL NOT be ordered.   Have you traveled during the pregnancy?No  Have your sexual partner(s) travelled during the pregnancy?No      - Current Medications    Current Outpatient Medications   Medication Sig Dispense Refill     Prenat-Fe Carbonyl-FA-Omega 3 (ONE-A-DAY WOMENS PRENATAL 1 PO) Take by mouth daily       VITAMIN D PO            - Co-morbids    Past Medical History:   Diagnosis Date     Anemia      Diabetes mellitus (H)     Gestational diabetes diet controlled     Tuberculosis     skin test + but chest xray ok       - Risk for GDM -  has Personal history of GDM WILL NOT have an early GCT due to gestational age, agrees to  Hgb A1C    - High Risk for Pre E-  Has History of Pre Eclampsia so WILL start low dose aspirin (81mg) starting between 12 and 28 weeks to prevent early onset preeclampsia.       The patient  does not have a history of spontaneous  birth so  WILL NOT consider progesterone starting at 16-20 weeks and/or serial transvaginal cervical length ultrasounds from 16-24 weeks.     -The patient  does not have a history of immunosuppresion or HIV so Toxoplasma IgG/IgM WILL NOT be ordered.    PERSONAL/SOCIAL HISTORY  Partner is involved,     lives with their family.  Employment: Full time. Her job involves light activity works as [employer] at [employer]  History of anxiety or depression  no  Additional items: None      PSYCHIATRIC:  Denies mood changes.    PHQ-9 score:    PHQ-9 SCORE 12/16/2019   PHQ-9 Total Score 0     ARMANDO-7 SCORE 11/14/2019 12/16/2019   Total Score 2 0         Past History:  Her past medical history   Past Medical History:   Diagnosis Date     Anemia      Diabetes mellitus (H)     Gestational diabetes diet controlled     Tuberculosis     skin test + but chest xray ok   .   She has a history of  hypertension and gestational diabetes, diet controlled  Since her last LMP she denies use of alcohol, tobacco and street drugs.  HISTORY:  Family History   Problem Relation Age of Onset     Eye Surgery No family hx of      Social History     Socioeconomic History     Marital status:      Spouse name: Not on file     Number of children: Not on file     Years of education: Not on file     Highest education level: Not on file   Occupational History     Not on file   Social Needs     Financial resource strain: Not on file     Food insecurity     Worry: Not on file     Inability: Not on file     Transportation needs     Medical: Not on file     Non-medical: Not on file   Tobacco Use     Smoking status: Never Smoker     Smokeless tobacco: Never Used   Substance and Sexual Activity     Alcohol use: No     Drug use: No     Sexual activity: Yes     Partners: Male   Lifestyle     Physical activity     Days per week: Not on file     Minutes per session: Not on file     Stress: Not on file   Relationships     Social connections     Talks on phone: Not on file     Gets together: Not on file     Attends Congregational service: Not on file     Active member of club or organization: Not on file     Attends  meetings of clubs or organizations: Not on file     Relationship status: Not on file     Intimate partner violence     Fear of current or ex partner: Not on file     Emotionally abused: Not on file     Physically abused: Not on file     Forced sexual activity: Not on file   Other Topics Concern     Not on file   Social History Narrative     Not on file     Current Outpatient Medications   Medication Sig     Prenat-Fe Carbonyl-FA-Omega 3 (ONE-A-DAY WOMENS PRENATAL 1 PO) Take by mouth daily     VITAMIN D PO      No current facility-administered medications for this visit.      No Known Allergies    ============================================  MEDICAL HISTORY  Past Medical History:   Diagnosis Date     Anemia      Diabetes mellitus (H)     Gestational diabetes diet controlled     Tuberculosis     skin test + but chest xray ok     Past Surgical History:   Procedure Laterality Date     GYN SURGERY      ovarian cyst       OB History    Para Term  AB Living   5 4 4 0 0 4   SAB TAB Ectopic Multiple Live Births   0 0 0 0 4      # Outcome Date GA Lbr Bryce/2nd Weight Sex Delivery Anes PTL Lv   5 Current            4 Term 18 41w1d 02:00 / 00:19 3.78 kg (8 lb 5.3 oz) M Vag-Spont EPI N JIM      Name: RAE GARCES HAMDI      Apgar1: 9  Apgar5: 9   3 Term 09/25/15 39w3d 03:30 / 00:11 3.74 kg (8 lb 3.9 oz) F Vag-Spont EPI  JIM      Apgar1: 6  Apgar5: 9   2 Term 10/23/13 39w4d 05:40 / 00:38 3.465 kg (7 lb 10.2 oz) F Vag-Spont EPI  JIM      Name: ROSENDO GARCES BEST I      Apgar1: 9  Apgar5: 9   1 Term 12 39w6d 15:00 / 04:33 3.459 kg (7 lb 10 oz) F Vag-Vacuum EPI, Local N JIM      Name: ROSENDO GARCESKYMBERLY      Apgar1: 8  Apgar5: 9      Obstetric Comments   Hx GDM with all pregnancies.  HTN after delivery in 2018-required medication.  No PPH.            GYN History- Denies Abnormal Pap Smears                        Cervical procedures: denies                        History of STI: denies    I personally reviewed  "the past social/family/medical and surgical history on the date of service.       ROS: 10 point ROS neg other than the symptoms noted above in the HPI.    Objective  /72 (BP Location: Left arm, Patient Position: Chair)   Pulse 87   Ht 1.689 m (5' 6.5\")   Wt 90.2 kg (198 lb 12.8 oz)   LMP 2019   BMI 31.61 kg/m     EXAM:  GENERAL:  Pleasant pregnant female, alert, cooperative and well groomed.  SKIN:  Warm and dry, without lesions or rashes  HEAD: Symmetrical features.  MOUTH:  Buccal mucosa pink, moist without lesions.  Teeth in good repair.    NECK:  Thyroid without enlargement and nodules.  Lymph nodes not palpable.   LUNGS:  Clear to auscultation.   HEART:  RRR without murmur.  ABDOMEN: Soft without masses , tenderness or organomegaly.  No CVA tenderness.  Uterus 18cm at size equal to dates.  No scars noted.. Fetal heart tones present.  MUSCULOSKELETAL:  Full range of motion  EXTREMITIES:  No edema. No significant varicosities.   PELVIC EXAM:  GENITALIA: deferred    GC/CHLAMYDIA CULTURE OBTAINED:needs to be collected at next visit    No results found for: PAP     Assessment/Plan  34 year old , 17w2d weeks of pregnancy with KARISSA of Sep 6, 2020 by US confirms  Intrauterine pregnancy 17w2d size consistent with dates  Genetic Screening: Level 2 Ultrasound only    Orders Placed This Encounter   Procedures     25- OH-Vitamin D     CBC with Platelets Differential     Hepatitis B Surface Antigen     HIV Antigen Antibody Combo     Rubella Antibody IgG Quantitative     Treponema Abs w Reflex to RPR and Titer     Hepatitis C antibody     Hgb A1c     Hepatitis B Surface Antibody     ALT     AST     Basic metabolic panel     Uric acid     Protein  random urine with Creat Ratio     [facility] FETAL MED CTR REFERRAL-PREGNANCY     ABO/Rh Type and Screen       - Oriented to Practice, types of care, and how to reach a provider.  Pt prefers MD team  - Patient received 1st trimester new OB education packet complete " with aide of The Expectant Family booklet including information on genetic screening test options.  - Patient desires level II ultrasound which was ordered.    - Educational handout on the prevention of infections diseases during pregnancy provided.  - Reviewed healthy diet and foods to avoid, exercise and activity during pregnancy; avoiding exposure to toxoplasmosis; and maintenance of a generally healthy lifestyle.   - Discussed the harms, benefits, side effects and alternative therapies for current prescribed and OTC medications. Patient was encouraged to start prenatal vitamins as tolerated.    - Reviewed use of triage nurse line and contacting the on-call provider after hours for an urgent need such as fever, vagina bleeding, bladder or vaginal infection, rupture of membranes,  or term labor.      - Reviewed best evidence for: weight gain for her weight and height for pregnancy:  Based on pre-pregnancy Body mass index is 31.61 kg/m . RECOMMENDED WEIGHT GAIN: < 15 lbs.    - Patient was sent to lab for routine OB labs including Hgb A1c and baseline pre-e labs.   - Reviewed high risk for gestational diabetes d/t Personal history of GDM, early 1hr not indicated at 17w.  Hgb A1c today  - Reviewed High risk for Pre Eclampsia d/t History of Pre Eclampsia  and ISagreeable to starting 81mg aspirin daily after 12 weeks .        - Pregnancy concerns to be addressed by provider at next OB visit include: history of gestational diabetes and history of preeclampsia.  - Instructed on iron supplement regimen.   - All pt questions discussed and answered.  Pt verbalized understanding of and agreement to plan of care.       - Continue scheduled prenatal care and prn if questions or concerns    HERBERT Satnana CNM    Again, thank you for allowing me to participate in the care of your patient.      Sincerely,    HERBERT Santana CNM

## 2020-04-01 ENCOUNTER — TELEPHONE (OUTPATIENT)
Dept: OBGYN | Facility: CLINIC | Age: 35
End: 2020-04-01

## 2020-04-01 DIAGNOSIS — R76.8 HEPATITIS C ANTIBODY POSITIVE IN BLOOD: Primary | ICD-10-CM

## 2020-04-01 LAB
BACTERIA SPEC CULT: NORMAL
Lab: NORMAL
SPECIMEN SOURCE: NORMAL

## 2020-04-01 NOTE — PROGRESS NOTES
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  LFTs were already done as part of baseline Pre E work up. IN order to get added on Hep C quant RNA had to addend visit as lab was unable to process add on as a future when attempted to place from lab review encounter.  HERBERT LoeraM

## 2020-04-01 NOTE — TELEPHONE ENCOUNTER
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  LFTs were already done as part of baseline Pre E work up. IN order to get added on Hep C quant RNA had to addend visit as lab was unable to process add on as a future when attempted to place from lab review encounter.  HERBERT Loera CNM       Attempted to contact pt via telephone - left voicemail for pt to call back to discuss lab results. No my chart available.     HERBERT Loera CNM      ADDENDUM 4/1/2020 1:39 PM  Was able to speak to patient after she returned call from voicemail.  Reviewed that HepC antibody screen came back positive - no noted history of Hep C in per chart.  Pt states no known history of or exposure to Hep C.  Reviewed Hep C Quant RNA in process, will contact pt once those results are received, pt agreeable.  Reviewed possible false positive vs new infection, will know more once next result received.    Pt asked about her Vitamin D level - was updated 35 is normal for pregnancy.    No other questions. HERBERT Loera CNM

## 2020-04-02 LAB — RUBV IGG SERPL IA-ACNC: 38 IU/ML

## 2020-04-03 ENCOUNTER — TELEPHONE (OUTPATIENT)
Dept: OBGYN | Facility: CLINIC | Age: 35
End: 2020-04-03

## 2020-04-03 LAB
HCV RNA SERPL NAA+PROBE-ACNC: NORMAL [IU]/ML
HCV RNA SERPL NAA+PROBE-LOG IU: NORMAL LOG IU/ML

## 2020-04-03 NOTE — RESULT ENCOUNTER NOTE
Discussed  C RNA result  with Magui Chen [facility] who recommended we talk with Dr. Richards to provide input on patients occupation as  and results and if any further steps are needed.     Spoke with Dr. Richards , who advised the antibody was likely a false positive or she may have had an exposure which she may have cleared.  She advised to repeat the C RNA test at 28 weeks of pregnancy.      Left message for Mark to return call to nursing at [phone] to discuss lab results.

## 2020-04-13 ENCOUNTER — VIRTUAL VISIT (OUTPATIENT)
Dept: MATERNAL FETAL MEDICINE | Facility: CLINIC | Age: 35
End: 2020-04-13
Attending: ADVANCED PRACTICE MIDWIFE
Payer: COMMERCIAL

## 2020-04-13 DIAGNOSIS — O09.522 SUPERVISION OF ELDERLY MULTIGRAVIDA IN SECOND TRIMESTER: Primary | ICD-10-CM

## 2020-04-13 DIAGNOSIS — O26.90 PREGNANCY RELATED CONDITION, ANTEPARTUM: ICD-10-CM

## 2020-04-13 PROCEDURE — 40000072 ZZH STATISTIC GENETIC COUNSELING, < 16 MIN: Mod: TEL,ZF | Performed by: GENETIC COUNSELOR, MS

## 2020-04-13 NOTE — PROGRESS NOTES
[facility] [place]  Genetic Counseling Consult    Patient:  Mark Cervantes YOB: 1985   Date of Service:  20        Mark was evaluated via a billable telephone visit at [facility] for genetic consultation given advanced maternal age.     The patient has been notified of the following:  This telephone visit will be conducted via a call between you and your physician/provider. We have found that certain health care needs can be provided without the need for a physical exam. This service lets us provide the care you need with a short phone conversation. If a prescription is necessary we can send it directly to your pharmacy. If lab work is needed we can place an order for that and you can then stop by our lab to have the test done at a later time.     If during the course of the call the provider feels a telephone visit is not appropriate, you will not be charged for this service.         Impression/Plan:   1. Mark has not had serum screening in this pregnancy.     2. Mark has a comprehensive (level II) ultrasound scheduled for 2020.      3. The patient declines genetic amniocentesis and maternal serum screening today, but may opt for NIPT if there are any concerning findings on her ultrasound next week.    Pregnancy History:   /Parity:    Age at Delivery: 35 year old  KARISSA: 2020, by Last Menstrual Period  Gestational Age: 19w1d    No significant complications or exposures were reported in the current pregnancy.    Mark sapp pregnancy history is significant for 4 prior full term deliveries with no reported complications.    Medical History:   Mark sapp reported medical history is not expected to impact pregnancy management or risks to fetal development.       Family History:   A three-generation pedigree was obtained, and is scanned under the  Media  tab.   The following significant findings were reported by  Mark:    Mark reports a maternal uncle with mild developmental delays/learning differences.  There is no known specific diagnosis or cause attributed to his differences.  Mark reports that in a previous pregnancy, a provider ordered testing for her x-chromosomes based on this history, and that the results were normal.  We discussed that this could indicate that she had carrier testing for fragile X syndrome, which is one of the most common causes for intellectual differences in males, and female relatives of affected men can be carriers.  Records of any carrier testing are not available for review today, but if Mark has had screening for fragile x syndrome which was negative, the pregnancy is considered low risk.      Otherwise, the reported family history is negative for multiple miscarriages, stillbirths, birth defects, cognitive impairment, known genetic conditions, and consanguinity.       Carrier Screening:       Expanded carrier screening for mutations in a large panel of genes associated with autosomal recessive conditions including cystic fibrosis, spinal muscular atrophy, and others, is now available.      The patient reports that she had previous carrier screening for a condition involving her X chromosome, based on the family history of a maternal uncle with developmental delay.  Results of this testing are not available for review, but if Mark has had carrier screening for fragile X- syndrome, and the results were negative, the pregnancy is considered low risk for being affected.        Risk Assessment for Chromosome Conditions:   We explained that the risk for fetal chromosome abnormalities increases with maternal age. We discussed specific features of common chromosome abnormalities, including Down syndrome, trisomy 13, trisomy 18, and sex chromosome trisomies.      - At age 34 at midtrimester, the risk to have a baby with Down syndrome is 1 in 274.     - At age 34 at midtrimester, the risk to  have a baby with any chromosome abnormality is 1 in 135.       Mark did not have maternal serum screening earlier in pregnancy.           Testing Options:   We discussed the following options:   Non-invasive Prenatal Testing (NIPT)    Maternal plasma cell-free DNA testing; first trimester ultrasound with nuchal translucency and nasal bone assessment is recommended, when appropriate    Screens for fetal trisomy 21, trisomy 13, trisomy 18, and sex chromosome aneuploidy    Cannot screen for open neural tube defects; maternal serum AFP after 15 weeks is recommended       Genetic Amniocentesis    Invasive procedure typically performed in the second trimester by which amniotic fluid is obtained for the purpose of chromosome analysis and/or other prenatal genetic analysis    Diagnostic results; >99% sensitivity for fetal chromosome abnormalities    AFAFP measurement tests for open neural tube defects       Comprehensive (Level II) ultrasound: Detailed ultrasound performed between 18-22 weeks gestation to screen for major birth defects and markers for aneuploidy.        We reviewed the benefits and limitations of this testing.  Screening tests provide a risk assessment specific to the pregnancy for certain fetal chromosome abnormalities, but cannot definitively diagnose or exclude a fetal chromosome abnormality.  Follow-up genetic counseling and consideration of diagnostic testing is recommended with any abnormal screening result.     Diagnostic tests carry inherent risks- including risk of miscarriage- that require careful consideration.  These tests can detect fetal chromosome abnormalities with greater than 99% certainty.  Results can be compromised by maternal cell contamination or mosaicism, and are limited by the resolution of cytogenetic G-banding technology.  There is no screening nor diagnostic test that can detect all forms of birth defects or mental disability.    It was a pleasure to be involved with Mark s  care.     Call Duration 16 minutes  Call start time 9:31  Call end time 9:46    Augustus Shankar MS, [facility]  Licensed Genetic Counselor  Phone: [phone]  Pager: [phone]

## 2020-04-16 ENCOUNTER — PRE VISIT (OUTPATIENT)
Dept: MATERNAL FETAL MEDICINE | Facility: CLINIC | Age: 35
End: 2020-04-16

## 2020-04-20 ENCOUNTER — OFFICE VISIT (OUTPATIENT)
Dept: MATERNAL FETAL MEDICINE | Facility: CLINIC | Age: 35
End: 2020-04-20
Attending: ADVANCED PRACTICE MIDWIFE
Payer: COMMERCIAL

## 2020-04-20 ENCOUNTER — HOSPITAL ENCOUNTER (OUTPATIENT)
Dept: ULTRASOUND IMAGING | Facility: CLINIC | Age: 35
End: 2020-04-20
Attending: ADVANCED PRACTICE MIDWIFE
Payer: COMMERCIAL

## 2020-04-20 DIAGNOSIS — O26.90 PREGNANCY RELATED CONDITION, ANTEPARTUM: ICD-10-CM

## 2020-04-20 DIAGNOSIS — O09.522 MULTIGRAVIDA OF ADVANCED MATERNAL AGE IN SECOND TRIMESTER: Primary | ICD-10-CM

## 2020-04-20 PROCEDURE — 76811 OB US DETAILED SNGL FETUS: CPT

## 2020-04-20 NOTE — PROGRESS NOTES
"Please see \"Imaging\" tab under \"Chart Review\" for details of today's US.    Sera Spring, DO    "

## 2020-05-18 ENCOUNTER — RESULTS ONLY (OUTPATIENT)
Dept: LAB | Age: 35
End: 2020-05-18

## 2020-05-26 LAB
COVID-19 SPIKE RBD ABY TITER: NORMAL
COVID-19 SPIKE RBD ABY: NEGATIVE

## 2020-06-30 ENCOUNTER — TELEPHONE (OUTPATIENT)
Dept: OBGYN | Facility: CLINIC | Age: 35
End: 2020-06-30

## 2020-07-07 NOTE — PROGRESS NOTES
[facility]   New OB Visit  2020      S: Mark Cervantes is a 35 year old old  at 31w3d by LMP consistent with 18w0d US who presents today to establish prenatal care.     Denies vaginal bleeding, loss of fluid, contractions. Normal fetal movement. Denies headache, vision changes, SOB, chest pain, RUQ/epigastric pain, increased edema, nausea, emesis.     Pregnancy notable for:  - Late to prenatal care  - Hep C Ab pos/Hep C RNA neg  - Hep B non-immune  - AMA  - Hx postpartum pre-eclampsia in fourth pregnancy  - Hx GDM in all previous pregnancies     Dating/Pregnancy Confirmation:   LMP: Patient's last menstrual period was 2019.  KARISSA: Sep 6, 2020  Approximate weeks gestation: 31w2d  Dating US: 3/31/2020 at 18w0d     OB History   s/p VAVD x1,  x3    OB History    Para Term  AB Living   5 4 4 0 0 4   SAB TAB Ectopic Multiple Live Births   0 0 0 0 4      # Outcome Date GA Lbr Bryce/2nd Weight Sex Delivery Anes PTL Lv   5 Current            4 Term 18 41w1d 02:00 / 00:19 3.78 kg (8 lb 5.3 oz) M Vag-Spont EPI N JIM      Name: MILI,BABY1 MARK      Apgar1: 9  Apgar5: 9   3 Term 09/25/15 39w3d 03:30 / 00:11 3.74 kg (8 lb 3.9 oz) F Vag-Spont EPI  JIM      Apgar1: 6  Apgar5: 9   2 Term 10/23/13 39w4d 05:40 / 00:38 3.465 kg (7 lb 10.2 oz) F Vag-Spont EPI  JIM      Name: ROSENDO CERVANTES I      Apgar1: 9  Apgar5: 9   1 Term 12 39w6d 15:00 / 04:33 3.459 kg (7 lb 10 oz) F Vag-Vacuum EPI, Local N JIM      Name: ROSENDO CERVANTES      Apgar1: 8  Apgar5: 9      Obstetric Comments   Hx GDM with all pregnancies.  HTN after delivery in 2018-required medication.  No PPH.        Denies hx of PPH, shoulder dysocia. Reports history of pre-eclampsia in fourth pregnancy and GDM in all previous pregnancies.     GYN History  - LMP: Patient's last menstrual period was 2019.  - Menses: regularly monthly periods  - Contraception: condoms  - Pap Smears: Last pap  NILM, HPV neg per  "patient at [facility]'s [place], no abnormal paps, no excisional procedures   - Sexual Activity: no currently sexually active with male partner   - Sexual Concerns: no sexual dysfunction, no dyspareunia, no low libido, no difficulty reaching orgasm  - Urinary complaints: Denies dysuria, urinary urgency, urinary frequency, urinary incontinence, gross hematuria.   - Hx STIs: Denies  - Hx UTIs: Denies  - GYN surgeries: laparoscopic left salpingectomy for 7 cm fallopian tube cyst 2011    Past Medical History:   Diagnosis Date     Anemia      Diabetes mellitus (H)     Gestational diabetes diet controlled     Tuberculosis     skin test + but chest xray ok     Denies history of asthma or hypertension.     Past Surgical History:   Procedure Laterality Date     GYN SURGERY      ovarian cyst     Reviewed, as above, notable for lsc left salpingectomy and no other abdominal surgeries    Family History   Problem Relation Age of Onset     Eye Surgery No family hx of      Denies any known family history of genetic disorders for patient or FOB. Denies any family history of stillbirth or unexplained  death for patient or FOB.     Meds: PNV, Vit D, Vit B12    Allergies: NKDA    Social history:   Lives locally.  Works as lab tech.   Never Smoker  Denies EtOH use  Denies illicit drug use     Preventative Health:  Seat belt usage: Always  Diet: Balanced  Exercise: Walking  Feelings of depression: No  Current or historical sexual, physical, verbal, emotional, or mental abuse: No    ROS: 10 point ROS was negative other than as noted above.    O:   /74   Pulse 98   Ht 1.689 m (5' 6.5\")   Wt 97.7 kg (215 lb 6.4 oz)   LMP 2019   BMI 34.25 kg/m      General:  Alert, no distress   HEENT:  Normocephalic, without obvious abnormality   Pulmonary:  Non-labored breathing on room air   Cardiovascular:  Regular rate   Abdomen:  Gravid, soft, non-tender, non-distended   Pelvic: Declined today. GC/CT urine collected.  "   Extremities:  Trace edema    Psychiatric: Appropriate mood and affect     FHT: 150s  Fundal Height: 33 cm     A/P: Mark Cervantes is a 35 year old old  at 31w3d by LMP consistent with 18w0d US who presents today to establish prenatal care.     # AMA  - S/p nl Level 2 US  - Recommend IOL at 39 weeks due to increased risk of stillbirth. Will discuss again at next visit.   - Growth US with next visit in 2 week- ordered  - Weekly BPPs beginning at 36 weeks- ordered    # Hx GDM  - Hgb A1C 5.6% at IOB  - GCT not completed today. Will make an appointment at lab to complete.   - Patient has not been checking blood glucose. Agreeable to checking QID BG in the meantime. Discussed goal of <95 fasting and <140 1-hr post-prandial. Patient reports that she has a blood glucose meter and testing strips from her prior pregnancy and is also able to check at work as a .     # Hx GHTN vs Pre-eclampsia without SF  - BP: normotensive   - No s/s of pre-eclampsia  - Baseline HELLP labs, UPC wnl   - Patient has not been taking daily ASA 81 mg for pre-eclampsia prophylaxis, and it is now too late to start.     # Hep C Ab pos  # Hep C RNA neg  Lab results may indicated prior cleared Hep C virus infection, false positive Hep C Ab result, or latent Hep C infection.   - Repeat Hep C RNA ordered today    # Hep B non-immune  - Recommended patient begin vaccine series today. She declined.     # PNC  - First trimester labs: Rh pos, Alejandra neg, Hgb 11.0, Plts 230, UA/UCx neg, RPR NR, HIV NR, Rubella immune, HepBSAg neg, Hep B non-immune, Hep C Ab pos/RNA neg, vit D wnl, A1C 5.6%, AST 20, ALT 19, Cr 0.34, UPC 0.13  - GC/CT collected today  - Pap Smear: Last 2015 NILM in our records. Per patient reports more recent pap at [facility]. Decline pap/hpv today.  - Immunizations: Declines TDAP today.   - Genetic screening: Level 2 US wnl. Declined serum screening.   - Imaging: Level 2 US wnl, placenta posterior  - Third trimester  labs ordered: CBC, RPR, GCT, repeat Hep C RNA    RTC in 2 weeks for CANDIS visit, Growth US    Patient discussed with Dr. Garcia.    Rodger Vargas MD  Ob/Gyn Resident, PGY-2  7/8/2020 5:27 PM     The Patient was seen in Resident Continuity Clinic by RODGER VARGAS.  I reviewed the history & exam. Assessment and plan were jointly made.    Berkley Garcia MD

## 2020-07-08 ENCOUNTER — OFFICE VISIT (OUTPATIENT)
Dept: OBGYN | Facility: CLINIC | Age: 35
End: 2020-07-08
Payer: COMMERCIAL

## 2020-07-08 VITALS
DIASTOLIC BLOOD PRESSURE: 74 MMHG | BODY MASS INDEX: 33.81 KG/M2 | HEART RATE: 98 BPM | SYSTOLIC BLOOD PRESSURE: 112 MMHG | WEIGHT: 215.4 LBS | HEIGHT: 67 IN

## 2020-07-08 DIAGNOSIS — O09.93 HRP (HIGH RISK PREGNANCY), THIRD TRIMESTER: Primary | ICD-10-CM

## 2020-07-08 PROCEDURE — 87491 CHLMYD TRACH DNA AMP PROBE: CPT | Performed by: OBSTETRICS & GYNECOLOGY

## 2020-07-08 PROCEDURE — 87591 N.GONORRHOEAE DNA AMP PROB: CPT | Performed by: OBSTETRICS & GYNECOLOGY

## 2020-07-08 ASSESSMENT — MIFFLIN-ST. JEOR: SCORE: 1691.74

## 2020-07-08 ASSESSMENT — PAIN SCALES - GENERAL: PAINLEVEL: NO PAIN (0)

## 2020-07-09 LAB
C TRACH DNA SPEC QL NAA+PROBE: NEGATIVE
N GONORRHOEA DNA SPEC QL NAA+PROBE: NEGATIVE
SPECIMEN SOURCE: NORMAL
SPECIMEN SOURCE: NORMAL

## 2020-07-10 DIAGNOSIS — O09.529 HIGH-RISK PREGNANCY, ELDERLY MULTIGRAVIDA, UNSPECIFIED TRIMESTER: ICD-10-CM

## 2020-07-10 LAB
ERYTHROCYTE [DISTWIDTH] IN BLOOD BY AUTOMATED COUNT: 15.5 % (ref 10–15)
GLUCOSE 1H P 50 G GLC PO SERPL-MCNC: 196 MG/DL (ref 60–129)
HCT VFR BLD AUTO: 33.6 % (ref 35–47)
HGB BLD-MCNC: 10.7 G/DL (ref 11.7–15.7)
MCH RBC QN AUTO: 25.7 PG (ref 26.5–33)
MCHC RBC AUTO-ENTMCNC: 31.8 G/DL (ref 31.5–36.5)
MCV RBC AUTO: 81 FL (ref 78–100)
PLATELET # BLD AUTO: 217 10E9/L (ref 150–450)
RBC # BLD AUTO: 4.16 10E12/L (ref 3.8–5.2)
WBC # BLD AUTO: 9 10E9/L (ref 4–11)

## 2020-07-10 PROCEDURE — 87522 HEPATITIS C REVRS TRNSCRPJ: CPT | Performed by: STUDENT IN AN ORGANIZED HEALTH CARE EDUCATION/TRAINING PROGRAM

## 2020-07-10 PROCEDURE — 86780 TREPONEMA PALLIDUM: CPT | Performed by: STUDENT IN AN ORGANIZED HEALTH CARE EDUCATION/TRAINING PROGRAM

## 2020-07-10 PROCEDURE — 36415 COLL VENOUS BLD VENIPUNCTURE: CPT | Performed by: STUDENT IN AN ORGANIZED HEALTH CARE EDUCATION/TRAINING PROGRAM

## 2020-07-10 PROCEDURE — 82950 GLUCOSE TEST: CPT | Performed by: STUDENT IN AN ORGANIZED HEALTH CARE EDUCATION/TRAINING PROGRAM

## 2020-07-10 PROCEDURE — 85027 COMPLETE CBC AUTOMATED: CPT | Performed by: STUDENT IN AN ORGANIZED HEALTH CARE EDUCATION/TRAINING PROGRAM

## 2020-07-11 LAB — T PALLIDUM AB SER QL: NONREACTIVE

## 2020-07-13 LAB
HCV RNA SERPL NAA+PROBE-ACNC: NORMAL [IU]/ML
HCV RNA SERPL NAA+PROBE-LOG IU: NORMAL LOG IU/ML

## 2020-07-15 ENCOUNTER — TELEPHONE (OUTPATIENT)
Dept: OBGYN | Facility: CLINIC | Age: 35
End: 2020-07-15

## 2020-07-16 ENCOUNTER — TELEPHONE (OUTPATIENT)
Dept: OBGYN | Facility: CLINIC | Age: 35
End: 2020-07-16

## 2020-07-16 DIAGNOSIS — R73.09 ELEVATED GLUCOSE: Primary | ICD-10-CM

## 2020-07-16 NOTE — TELEPHONE ENCOUNTER
----- Message from Emily Whitney LPN sent at 7/16/2020  3:33 PM CDT -----  Regarding: put in orders for glucometer kit and supplies  Hi I explained to patient diabtes- had before doesn't need classes- just supplies sent to [facility] pharmacy- can you put those in?  emily  ----- Message -----  From: Shira Richards MD  Sent: 7/14/2020   2:40 PM CDT  To: [facility]    Mark failed her GCT >190. Please get her set up to see DM ed and with the supplies she needs to start testing.     Thank you    Shira Richards MD, [facility] Staff  OB/GYN    7/14/2020  2:41 PM

## 2020-07-16 NOTE — TELEPHONE ENCOUNTER
Patient knows that she failed glucose testing and now is considered to have gestational diabetes-  She would like us to reflll new monitor and supplies to [facility] pharmacy.  Told to check fasting and two hour after breakfast lunch and dinner-  Will bring blood sugar log to next ob appointment 7-30-20.

## 2020-07-29 NOTE — PROGRESS NOTES
[facility]   New OB Visit  2020        S: Mark Cervantes is a 35 year old old  at 34w4d by LMP consistent with 18w0d US who presents today for routine OB visit     Denies vaginal bleeding, loss of fluid, contractions. Reports not a lot of fetal movement, says she has not felt baby move since 3 am but this is normal for her. Denies headache, vision changes, SOB, chest pain, RUQ/epigastric pain, increased edema, nausea, emesis.     Concerns today include: denies any specific concerns    Has not seen diabetic education yet, not planning on seeing them     Not currently taking insulin.    Fasting blood sugars: 99, lowest 92, highest 102. Eats around 10-11 pm, measures around 6 am.   Postprandial blood sugars: 135 highest, lowest 121     Pregnancy notable for:  - Late to prenatal care  - Hep C Ab pos/Hep C RNA neg X 2  - Hep B non-immune  - AMA  - Hx postpartum pre-eclampsia in fourth pregnancy  - GDM in this pregnancy, failed GCT     Dating/Pregnancy Confirmation:   LMP: Patient's last menstrual period was 2019.  KARISSA: Sep 6, 2020  Dating US: 3/31/2020 at 18w0d      OB History   s/p VAVD x1,  x3                      OB History    Para Term  AB Living   5 4 4 0 0 4   SAB TAB Ectopic Multiple Live Births      0 0 0 0 4          # Outcome Date GA Lbr Bryce/2nd Weight Sex Delivery Anes PTL Lv   5 Current                     4 Term 18 41w1d 02:00 / 00:19 3.78 kg (8 lb 5.3 oz) M Vag-Spont EPI N JIM      Name: RAE CERVANTES      Apgar1: 9  Apgar5: 9   3 Term 09/25/15 39w3d 03:30 / 00:11 3.74 kg (8 lb 3.9 oz) F Vag-Spont EPI   JIM      Apgar1: 6  Apgar5: 9   2 Term 10/23/13 39w4d 05:40 / 00:38 3.465 kg (7 lb 10.2 oz) F Vag-Spont EPI   JIM      Name: ROSENDO CERVANTES      Apgar1: 9  Apgar5: 9   1 Term 12 39w6d 15:00 / 04:33 3.459 kg (7 lb 10 oz) F Vag-Vacuum EPI, Local N JIM      Name: MILI,G1 MARK      Apgar1: 8  Apgar5: 9       Obstetric Comments   Hx GDM  "with all pregnancies.  HTN after delivery in 2018-required medication.  No PPH.         Past Medical History        Past Medical History:   Diagnosis Date     Anemia       Diabetes mellitus (H)       Gestational diabetes diet controlled     Tuberculosis       skin test + but chest xray ok        Past Surgical History         Past Surgical History:   Procedure Laterality Date     GYN SURGERY         ovarian cyst        notable for lsc left salpingectomy and no other abdominal surgeries     Family History         Family History   Problem Relation Age of Onset     Eye Surgery No family hx of          Meds: PNV, Vit D, Vit B12, iron     Allergies: NKDA     ROS: 10 point ROS was negative other than as noted above.     O:   LMP 2019   /68 (BP Location: Left arm, Patient Position: Chair)   Pulse 79   Ht 1.689 m (5' 6.5\")   Wt 95.8 kg (211 lb 4.8 oz)   LMP 2019   BMI 33.59 kg/m         General:  Alert, no distress   HEENT:  Normocephalic, without obvious abnormality   Pulmonary:  Non-labored breathing on room air   Cardiovascular:  Regular rate   Abdomen:  Gravid, soft, non-tender, non-distended   Extremities:  Trace edema    Psychiatric: Appropriate mood and affect      FHT: 140 bpm  Fundal Height: 35 cm     A/P: Mark Cervantes is a 35 year old old  at 34w4d by LMP consistent with 18w0d US who presents today for routine OB visit     # AMA  - S/p nl Level 2 US  - Recommend IOL at 39 weeks due to increased risk of stillbirth. Patient amenable, ~    - Growth US today after this visit  - Weekly BPPs at 36 weeks, discussed with patient     # GDMA1  - Hgb A1C 5.6% at IOB  - GCT elevated at 196, assuming GDM, blood glucose review today mostly at goal, could improve fasting. Patient says she will try to eat dinner sooner, take fasting later in AM  - recommend continuing QID BG. Reiterated goal of <95 fasting and <140 1-hr post-prandial and <120 at 2 hours post-prandial.   - will not see " "diabetic education     # Hx GHTN vs Pre-eclampsia without SF  - BP: normotensive   - No s/s of pre-eclampsia  - Baseline HELLP labs, UPC wnl   - Patient did not take ASA for PPX     # Hep C Ab pos  # Hep C RNA neg  Lab results may indicated prior cleared Hep C virus infection, false positive Hep C Ab result, or latent Hep C infection.   - Repeat Hep C RNA negative  Per UpToDate: \"The absence of detectable HCV RNA essentially confirms the absence of chronic HCV infection\"   and \"In this situation, the reactive anti-HCV antibody most likely represents prior infection that subsequently cleared spontaneously (or following successful therapy) or a false-positive antibody test due to technical reasons.\"  - will repeat hep C antibody when she has her intrapartum labs drawn     # Hep B non-immune  - Recommended patient begin vaccine series today. She declines     # PNC  - First trimester labs: Rh pos, Alejandra neg, Hgb 11.0, Plts 230, UA/UCx neg, RPR NR, HIV NR, Rubella immune, HepBSAg neg, Hep B non-immune, Hep C Ab pos/RNA neg, vit D wnl, A1C 5.6%, AST 20, ALT 19, Cr 0.34, UPC 0.13  - GC/CT negative  - Pap Smear: Last 9/2015 NILM in our records. Per patient reports more recent pap at [facility]. Decline pap/hpv today.  - Immunizations: TDAP given today  - Genetic screening: Level 2 US wnl. Declined serum screening.   - Imaging: Level 2 US wnl, placenta posterior     RTC for 36 week weekly BPPs and 37 week CANDIS with weekly ones after     Patient discussed with Dr. Winston Baltazar MD  Obstetrics and Gynecology PGY-2  7/30/20 158pm    The Patient was seen in Resident Continuity Clinic by SANDRA BALTAZAR.  I reviewed the history & exam. Assessment and plan were jointly made.    Lisy Montero MD      "

## 2020-07-30 ENCOUNTER — OFFICE VISIT (OUTPATIENT)
Dept: OBGYN | Facility: CLINIC | Age: 35
End: 2020-07-30
Payer: COMMERCIAL

## 2020-07-30 ENCOUNTER — ANCILLARY PROCEDURE (OUTPATIENT)
Dept: ULTRASOUND IMAGING | Facility: CLINIC | Age: 35
End: 2020-07-30
Attending: OBSTETRICS & GYNECOLOGY
Payer: COMMERCIAL

## 2020-07-30 VITALS
HEART RATE: 79 BPM | BODY MASS INDEX: 33.16 KG/M2 | SYSTOLIC BLOOD PRESSURE: 101 MMHG | WEIGHT: 211.3 LBS | HEIGHT: 67 IN | DIASTOLIC BLOOD PRESSURE: 68 MMHG

## 2020-07-30 DIAGNOSIS — O09.90 HIGH RISK PREGNANCY, ANTEPARTUM: Primary | ICD-10-CM

## 2020-07-30 DIAGNOSIS — O24.410 DIET CONTROLLED GESTATIONAL DIABETES MELLITUS (GDM) IN THIRD TRIMESTER: ICD-10-CM

## 2020-07-30 DIAGNOSIS — O09.93 HRP (HIGH RISK PREGNANCY), THIRD TRIMESTER: ICD-10-CM

## 2020-07-30 PROBLEM — O24.419 GESTATIONAL DIABETES MELLITUS: Status: ACTIVE | Noted: 2020-07-30

## 2020-07-30 PROCEDURE — 90715 TDAP VACCINE 7 YRS/> IM: CPT | Mod: ZF

## 2020-07-30 PROCEDURE — 76819 FETAL BIOPHYS PROFIL W/O NST: CPT

## 2020-07-30 PROCEDURE — 90471 IMMUNIZATION ADMIN: CPT | Mod: ZF

## 2020-07-30 PROCEDURE — G0463 HOSPITAL OUTPT CLINIC VISIT: HCPCS | Mod: ZF,25

## 2020-07-30 PROCEDURE — 25000128 H RX IP 250 OP 636: Mod: ZF

## 2020-07-30 ASSESSMENT — MIFFLIN-ST. JEOR: SCORE: 1673.14

## 2020-07-30 NOTE — PATIENT INSTRUCTIONS
Tylenol is safe in pregnancy, but not ibuprofen  Weekly fetal US at 36 weeks  Prenatal visit at 37 weeks and every week after  Would like fasting glucoses <95

## 2020-08-10 ENCOUNTER — TELEPHONE (OUTPATIENT)
Dept: OBGYN | Facility: CLINIC | Age: 35
End: 2020-08-10

## 2020-08-10 DIAGNOSIS — O24.410 DIET CONTROLLED GESTATIONAL DIABETES MELLITUS (GDM) IN THIRD TRIMESTER: Primary | ICD-10-CM

## 2020-08-10 NOTE — TELEPHONE ENCOUNTER
Left message for patient that she needs to call and schedule visit for bpp ultrasound  Ob visit and will have labs after.  Orders placed for ultrasound-  Hasn't seen ob for 7-3-20 needs to be seen this week          ----- Message from Lena Zarate sent at 8/7/2020  3:16 PM CDT -----  Regarding: FW: US / Lab appts needed per pt  Contact: [phone]  Please place order for weekly bpp's.  Thanks.  ----- Message -----  From: Saba Anderson  Sent: 8/7/2020  11:56 AM CDT  To[facility]  Subject: US / Lab appts needed per pt                     The pt states she needs an US and lab apt. Please call her at [phone] to schedule. Thanks.  Thanks - Saba    Please DO NOT send this message and/or reply back to sender.  Call Center Representatives DO NOT respond to messages.

## 2020-08-17 ENCOUNTER — OFFICE VISIT (OUTPATIENT)
Dept: OBGYN | Facility: CLINIC | Age: 35
End: 2020-08-17
Attending: OBSTETRICS & GYNECOLOGY
Payer: COMMERCIAL

## 2020-08-17 VITALS
WEIGHT: 212.7 LBS | SYSTOLIC BLOOD PRESSURE: 113 MMHG | HEART RATE: 87 BPM | BODY MASS INDEX: 33.38 KG/M2 | HEIGHT: 67 IN | DIASTOLIC BLOOD PRESSURE: 73 MMHG

## 2020-08-17 DIAGNOSIS — O09.93 HRP (HIGH RISK PREGNANCY), THIRD TRIMESTER: Primary | ICD-10-CM

## 2020-08-17 DIAGNOSIS — O09.523 MULTIGRAVIDA OF ADVANCED MATERNAL AGE IN THIRD TRIMESTER: ICD-10-CM

## 2020-08-17 DIAGNOSIS — O24.410 DIET CONTROLLED GESTATIONAL DIABETES MELLITUS (GDM) IN THIRD TRIMESTER: ICD-10-CM

## 2020-08-17 PROCEDURE — G0463 HOSPITAL OUTPT CLINIC VISIT: HCPCS | Mod: ZF

## 2020-08-17 PROCEDURE — 87653 STREP B DNA AMP PROBE: CPT | Performed by: OBSTETRICS & GYNECOLOGY

## 2020-08-17 ASSESSMENT — MIFFLIN-ST. JEOR: SCORE: 1679.49

## 2020-08-17 NOTE — PROGRESS NOTES
"S:  Doing well, good FM, some contractions-nothing regular.  Reports blood sugars have been \"very good,\" fastings all less than 99, all 2 hour pp less than 140    O: see flow    GBS collected  cx 1/l/-3, posterior, cephalic    A:  36 y/o  at 37+1 weeks, doing well.  Pregnancy complicated by AMA, at least A1GDM, late to prenatal care    P:  BPP planned for this Wednesday, continue weekly until delivery.  Labor precautions reviewed, RTC 1 week.    Carie Bermudez MD, FACOG    "

## 2020-08-17 NOTE — NURSING NOTE
Chief Complaint   Patient presents with     Prenatal Care     37w1d       See KYRIE Brennan 8/17/2020

## 2020-08-17 NOTE — LETTER
"2020       RE: Mark Cervantes  [address]  [place] [address]     Dear Colleague,    Thank you for referring your patient, Mark Cervantes, to the [facility] at [facility]. Please see a copy of my visit note below.    S:  Doing well, good FM, some contractions-nothing regular.  Reports blood sugars have been \"very good,\" fastings all less than 99, all 2 hour pp less than 140    O: see flow    GBS collected  cx 1/l/-3, posterior, cephalic    A:  36 y/o  at 37+1 weeks, doing well.  Pregnancy complicated by AMA, at least A1GDM, late to prenatal care    P:  BPP planned for this Wednesday, continue weekly until delivery.  Labor precautions reviewed, RTC 1 week.    Carie Bermudez MD, FACOG      Again, thank you for allowing me to participate in the care of your patient.      Sincerely,    Carie Bermudez MD      "

## 2020-08-17 NOTE — LETTER
Date:August 19, 2020      Patient was self referred, no letter generated. Do not send.        [facility] Health Information

## 2020-08-18 LAB
GP B STREP DNA SPEC QL NAA+PROBE: NEGATIVE
SPECIMEN SOURCE: NORMAL

## 2020-08-19 ENCOUNTER — ANCILLARY PROCEDURE (OUTPATIENT)
Dept: ULTRASOUND IMAGING | Facility: CLINIC | Age: 35
End: 2020-08-19
Attending: OBSTETRICS & GYNECOLOGY
Payer: COMMERCIAL

## 2020-08-19 DIAGNOSIS — O24.410 DIET CONTROLLED GESTATIONAL DIABETES MELLITUS (GDM) IN THIRD TRIMESTER: ICD-10-CM

## 2020-08-19 PROCEDURE — 76819 FETAL BIOPHYS PROFIL W/O NST: CPT

## 2020-08-26 ENCOUNTER — OFFICE VISIT (OUTPATIENT)
Dept: OBGYN | Facility: CLINIC | Age: 35
End: 2020-08-26
Attending: ADVANCED PRACTICE MIDWIFE
Payer: COMMERCIAL

## 2020-08-26 VITALS
WEIGHT: 213.8 LBS | DIASTOLIC BLOOD PRESSURE: 69 MMHG | SYSTOLIC BLOOD PRESSURE: 101 MMHG | BODY MASS INDEX: 33.56 KG/M2 | HEART RATE: 91 BPM | HEIGHT: 67 IN

## 2020-08-26 DIAGNOSIS — O09.93 HRP (HIGH RISK PREGNANCY), THIRD TRIMESTER: Primary | ICD-10-CM

## 2020-08-26 PROCEDURE — G0463 HOSPITAL OUTPT CLINIC VISIT: HCPCS | Mod: ZF

## 2020-08-26 ASSESSMENT — PAIN SCALES - GENERAL: PAINLEVEL: NO PAIN (0)

## 2020-08-26 ASSESSMENT — MIFFLIN-ST. JEOR: SCORE: 1684.48

## 2020-08-26 NOTE — LETTER
Date:August 28, 2020      Patient was self referred, no letter generated. Do not send.        [facility] Health Information

## 2020-08-26 NOTE — PROGRESS NOTES
"Subjective:     35 year old  at 38w3d presents for routine prenatal visit.  Was not scheduled for BPP, plan BPP later this week         Denies vaginal bleeding or leakage of fluid.  Reports irregular contractions.  Reports regular fetal movement.        No HA, visual changes, RUQ or epigastric pain.   Patient concerns: Continues to have trouble sleeping, declines OTC sleep medication.  Reports some restless leg syndrome, managing well.  Blood sugars are reported today, did not bring meter.  Reports that fasting levels is the 90's only one 96, after meals range 120-145, but only occasionally over 140 1hr PP.  Objective:  Vitals:    20 0912   BP: 101/69   Pulse: 91   Weight: 97 kg (213 lb 12.8 oz)   Height: 1.689 m (5' 6.5\")    See OB flowsheet  Assessment/Plan     Encounter Diagnosis   Name Primary?     HRP (high risk pregnancy), third trimester Yes       - Reviewed why/how to contact provider if headache/visual changes/RUQ or epigastric pain, decreased fetal movement, vaginal bleeding, leakage of fluid or strong/regular contractions.   Patient education/orders or handouts today:  Sign/symptoms of labor and When to call for labor or other concerns  Return to clinic in 1 week and prn if questions or concerns.   HERBERT Santana CNM    "

## 2020-08-26 NOTE — LETTER
"2020       RE: Mark Cervantes  [address]  [place] [address]     Dear Colleague,    Thank you for referring your patient, Mark Cervantes, to the [facility] at [facility]. Please see a copy of my visit note below.    Subjective:     35 year old  at 38w3d presents for routine prenatal visit.  Was not scheduled for BPP, plan BPP later this week         Denies vaginal bleeding or leakage of fluid.  Reports irregular contractions.  Reports regular fetal movement.        No HA, visual changes, RUQ or epigastric pain.   Patient concerns: Continues to have trouble sleeping, declines OTC sleep medication.  Reports some restless leg syndrome, managing well.  Blood sugars are reported today, did not bring meter.  Reports that fasting levels is the 90's only one 96, after meals range 120-145, but only occasionally over 140 1hr PP.  Objective:  Vitals:    20 0912   BP: 101/69   Pulse: 91   Weight: 97 kg (213 lb 12.8 oz)   Height: 1.689 m (5' 6.5\")    See OB flowsheet  Assessment/Plan     Encounter Diagnosis   Name Primary?     HRP (high risk pregnancy), third trimester Yes       - Reviewed why/how to contact provider if headache/visual changes/RUQ or epigastric pain, decreased fetal movement, vaginal bleeding, leakage of fluid or strong/regular contractions.   Patient education/orders or handouts today:  Sign/symptoms of labor and When to call for labor or other concerns  Return to clinic in 1 week and prn if questions or concerns.   HERBERT Santana CNM      Again, thank you for allowing me to participate in the care of your patient.      Sincerely,    HERBERT Santana CNM      "

## 2020-08-27 ENCOUNTER — ANCILLARY PROCEDURE (OUTPATIENT)
Dept: ULTRASOUND IMAGING | Facility: CLINIC | Age: 35
End: 2020-08-27
Attending: OBSTETRICS & GYNECOLOGY
Payer: COMMERCIAL

## 2020-08-27 PROCEDURE — 76819 FETAL BIOPHYS PROFIL W/O NST: CPT

## 2020-09-02 ENCOUNTER — OFFICE VISIT (OUTPATIENT)
Dept: OBGYN | Facility: CLINIC | Age: 35
End: 2020-09-02
Attending: OBSTETRICS & GYNECOLOGY
Payer: COMMERCIAL

## 2020-09-02 ENCOUNTER — ANCILLARY PROCEDURE (OUTPATIENT)
Dept: ULTRASOUND IMAGING | Facility: CLINIC | Age: 35
End: 2020-09-02
Attending: OBSTETRICS & GYNECOLOGY
Payer: COMMERCIAL

## 2020-09-02 VITALS
SYSTOLIC BLOOD PRESSURE: 110 MMHG | HEIGHT: 67 IN | DIASTOLIC BLOOD PRESSURE: 74 MMHG | HEART RATE: 78 BPM | BODY MASS INDEX: 33.54 KG/M2 | WEIGHT: 213.7 LBS

## 2020-09-02 DIAGNOSIS — O09.93 HRP (HIGH RISK PREGNANCY), THIRD TRIMESTER: Primary | ICD-10-CM

## 2020-09-02 PROCEDURE — G0463 HOSPITAL OUTPT CLINIC VISIT: HCPCS | Mod: ZF

## 2020-09-02 PROCEDURE — 76819 FETAL BIOPHYS PROFIL W/O NST: CPT

## 2020-09-02 ASSESSMENT — MIFFLIN-ST. JEOR: SCORE: 1684.03

## 2020-09-02 NOTE — LETTER
"2020       RE: Mark Cervantes  [address]  [place] [address]     Dear Colleague,    Thank you for referring your patient, Mark Cervantes, to the [facility] at [facility]. Please see a copy of my visit note below.    Feeling well. Ready for delivery.  She wants to be induced, if not in labor by due date.      /74 (BP Location: Right arm, Patient Position: Chair)   Pulse 78   Ht 1.689 m (5' 6.5\")   Wt 96.9 kg (213 lb 11.2 oz)   LMP 2019   BMI 33.98 kg/m    See flow    A/p: 34 yo  at 39+3 wks, preg c/b Hep C, GDM, LGA.    1. PNC: utd. GBS neg.      2. GDM no meds; all BS within goal per pt.  Uls today w/ LGA baby, AC>97%.  4300 g EFW.    3. Hep C.  Will order Hep C RNA on admit .    4. IOL scheduled on - 40 wks.     Shira Richards MD, FACOG  [facility]  OB/GYN    2020  1:16 PM        Again, thank you for allowing me to participate in the care of your patient.      Sincerely,    Shira Richards MD      "

## 2020-09-02 NOTE — NURSING NOTE
Chief Complaint   Patient presents with     Prenatal Care     39w3d       See KYRIE Brennan 9/2/2020

## 2020-09-02 NOTE — LETTER
Date:September 9, 2020      Patient was self referred, no letter generated. Do not send.        [facility] Health Information

## 2020-09-06 ENCOUNTER — ANESTHESIA (OUTPATIENT)
Dept: OBGYN | Facility: CLINIC | Age: 35
End: 2020-09-06
Payer: COMMERCIAL

## 2020-09-06 ENCOUNTER — ANESTHESIA EVENT (OUTPATIENT)
Dept: OBGYN | Facility: CLINIC | Age: 35
End: 2020-09-06
Payer: COMMERCIAL

## 2020-09-06 ENCOUNTER — HOSPITAL ENCOUNTER (INPATIENT)
Facility: CLINIC | Age: 35
LOS: 2 days | Discharge: HOME | End: 2020-09-08
Attending: OBSTETRICS & GYNECOLOGY | Admitting: OBSTETRICS & GYNECOLOGY
Payer: COMMERCIAL

## 2020-09-06 DIAGNOSIS — Z86.32 HISTORY OF GESTATIONAL DIABETES MELLITUS (GDM): Chronic | ICD-10-CM

## 2020-09-06 PROBLEM — Z34.90 ENCOUNTER FOR INDUCTION OF LABOR: Status: ACTIVE | Noted: 2020-09-06

## 2020-09-06 LAB
ABO + RH BLD: NORMAL
ABO + RH BLD: NORMAL
BASOPHILS # BLD AUTO: 0.1 10E9/L (ref 0–0.2)
BASOPHILS NFR BLD AUTO: 0.5 %
BLD GP AB SCN SERPL QL: NORMAL
BLOOD BANK CMNT PATIENT-IMP: NORMAL
DIFFERENTIAL METHOD BLD: ABNORMAL
EOSINOPHIL # BLD AUTO: 0.3 10E9/L (ref 0–0.7)
EOSINOPHIL NFR BLD AUTO: 3 %
ERYTHROCYTE [DISTWIDTH] IN BLOOD BY AUTOMATED COUNT: 16.8 % (ref 10–15)
GLUCOSE BLDC GLUCOMTR-MCNC: 67 MG/DL (ref 70–99)
GLUCOSE BLDC GLUCOMTR-MCNC: 78 MG/DL (ref 70–99)
GLUCOSE BLDC GLUCOMTR-MCNC: 92 MG/DL (ref 70–99)
HCT VFR BLD AUTO: 34.4 % (ref 35–47)
HGB BLD-MCNC: 10.9 G/DL (ref 11.7–15.7)
IMM GRANULOCYTES # BLD: 0 10E9/L (ref 0–0.4)
IMM GRANULOCYTES NFR BLD: 0.4 %
LABORATORY COMMENT REPORT: NORMAL
LYMPHOCYTES # BLD AUTO: 2.1 10E9/L (ref 0.8–5.3)
LYMPHOCYTES NFR BLD AUTO: 21.1 %
MCH RBC QN AUTO: 24.8 PG (ref 26.5–33)
MCHC RBC AUTO-ENTMCNC: 31.7 G/DL (ref 31.5–36.5)
MCV RBC AUTO: 78 FL (ref 78–100)
MONOCYTES # BLD AUTO: 0.8 10E9/L (ref 0–1.3)
MONOCYTES NFR BLD AUTO: 7.6 %
NEUTROPHILS # BLD AUTO: 6.6 10E9/L (ref 1.6–8.3)
NEUTROPHILS NFR BLD AUTO: 67.4 %
NRBC # BLD AUTO: 0 10*3/UL
NRBC BLD AUTO-RTO: 0 /100
PLATELET # BLD AUTO: 238 10E9/L (ref 150–450)
RBC # BLD AUTO: 4.4 10E12/L (ref 3.8–5.2)
SARS-COV-2 RNA SPEC QL NAA+PROBE: NEGATIVE
SARS-COV-2 RNA SPEC QL NAA+PROBE: NORMAL
SPECIMEN EXP DATE BLD: NORMAL
SPECIMEN SOURCE: NORMAL
SPECIMEN SOURCE: NORMAL
WBC # BLD AUTO: 9.8 10E9/L (ref 4–11)

## 2020-09-06 PROCEDURE — 86901 BLOOD TYPING SEROLOGIC RH(D): CPT

## 2020-09-06 PROCEDURE — 86900 BLOOD TYPING SEROLOGIC ABO: CPT

## 2020-09-06 PROCEDURE — 86850 RBC ANTIBODY SCREEN: CPT

## 2020-09-06 PROCEDURE — 85025 COMPLETE CBC W/AUTO DIFF WBC: CPT

## 2020-09-06 PROCEDURE — 86803 HEPATITIS C AB TEST: CPT

## 2020-09-06 PROCEDURE — 86780 TREPONEMA PALLIDUM: CPT

## 2020-09-06 PROCEDURE — 00000146 ZZHCL STATISTIC GLUCOSE BY METER IP

## 2020-09-06 PROCEDURE — U0003 INFECTIOUS AGENT DETECTION BY NUCLEIC ACID (DNA OR RNA); SEVERE ACUTE RESPIRATORY SYNDROME CORONAVIRUS 2 (SARS-COV-2) (CORONAVIRUS DISEASE [COVID-19]), AMPLIFIED PROBE TECHNIQUE, MAKING USE OF HIGH THROUGHPUT TECHNOLOGIES AS DESCRIBED BY CMS-2020-01-R: HCPCS | Performed by: OBSTETRICS & GYNECOLOGY

## 2020-09-06 PROCEDURE — 12000001 ZZH R&B MED SURG/OB UMMC

## 2020-09-06 PROCEDURE — 25000132 ZZH RX MED GY IP 250 OP 250 PS 637

## 2020-09-06 PROCEDURE — 25800030 ZZH RX IP 258 OP 636

## 2020-09-06 RX ORDER — DEXTROSE MONOHYDRATE 25 G/50ML
25-50 INJECTION, SOLUTION INTRAVENOUS
Status: DISCONTINUED | OUTPATIENT
Start: 2020-09-06 | End: 2020-09-07

## 2020-09-06 RX ORDER — OXYCODONE AND ACETAMINOPHEN 5; 325 MG/1; MG/1
1 TABLET ORAL
Status: DISCONTINUED | OUTPATIENT
Start: 2020-09-06 | End: 2020-09-07

## 2020-09-06 RX ORDER — OXYTOCIN 10 [USP'U]/ML
10 INJECTION, SOLUTION INTRAMUSCULAR; INTRAVENOUS
Status: DISCONTINUED | OUTPATIENT
Start: 2020-09-06 | End: 2020-09-07

## 2020-09-06 RX ORDER — METOCLOPRAMIDE HYDROCHLORIDE 5 MG/ML
10 INJECTION INTRAMUSCULAR; INTRAVENOUS EVERY 6 HOURS PRN
Status: DISCONTINUED | OUTPATIENT
Start: 2020-09-06 | End: 2020-09-07

## 2020-09-06 RX ORDER — NALOXONE HYDROCHLORIDE 0.4 MG/ML
.1-.4 INJECTION, SOLUTION INTRAMUSCULAR; INTRAVENOUS; SUBCUTANEOUS
Status: DISCONTINUED | OUTPATIENT
Start: 2020-09-06 | End: 2020-09-07

## 2020-09-06 RX ORDER — NICOTINE POLACRILEX 4 MG
15-30 LOZENGE BUCCAL
Status: DISCONTINUED | OUTPATIENT
Start: 2020-09-06 | End: 2020-09-07

## 2020-09-06 RX ORDER — CARBOPROST TROMETHAMINE 250 UG/ML
250 INJECTION, SOLUTION INTRAMUSCULAR
Status: DISCONTINUED | OUTPATIENT
Start: 2020-09-06 | End: 2020-09-07

## 2020-09-06 RX ORDER — NALBUPHINE HYDROCHLORIDE 20 MG/ML
2.5-5 INJECTION, SOLUTION INTRAMUSCULAR; INTRAVENOUS; SUBCUTANEOUS EVERY 6 HOURS PRN
Status: DISCONTINUED | OUTPATIENT
Start: 2020-09-06 | End: 2020-09-07

## 2020-09-06 RX ORDER — OXYTOCIN/0.9 % SODIUM CHLORIDE 30/500 ML
100-340 PLASTIC BAG, INJECTION (ML) INTRAVENOUS CONTINUOUS PRN
Status: COMPLETED | OUTPATIENT
Start: 2020-09-06 | End: 2020-09-07

## 2020-09-06 RX ORDER — ACETAMINOPHEN 325 MG/1
650 TABLET ORAL EVERY 4 HOURS PRN
Status: DISCONTINUED | OUTPATIENT
Start: 2020-09-06 | End: 2020-09-07

## 2020-09-06 RX ORDER — IBUPROFEN 800 MG/1
800 TABLET, FILM COATED ORAL
Status: DISCONTINUED | OUTPATIENT
Start: 2020-09-06 | End: 2020-09-07

## 2020-09-06 RX ORDER — SODIUM CHLORIDE, SODIUM LACTATE, POTASSIUM CHLORIDE, CALCIUM CHLORIDE 600; 310; 30; 20 MG/100ML; MG/100ML; MG/100ML; MG/100ML
INJECTION, SOLUTION INTRAVENOUS CONTINUOUS
Status: DISCONTINUED | OUTPATIENT
Start: 2020-09-06 | End: 2020-09-07

## 2020-09-06 RX ORDER — PROCHLORPERAZINE 25 MG
25 SUPPOSITORY, RECTAL RECTAL EVERY 12 HOURS PRN
Status: DISCONTINUED | OUTPATIENT
Start: 2020-09-06 | End: 2020-09-07

## 2020-09-06 RX ORDER — MISOPROSTOL 100 UG/1
25 TABLET ORAL ONCE
Status: COMPLETED | OUTPATIENT
Start: 2020-09-06 | End: 2020-09-06

## 2020-09-06 RX ORDER — MISOPROSTOL 100 UG/1
25 TABLET ORAL
Status: DISCONTINUED | OUTPATIENT
Start: 2020-09-06 | End: 2020-09-07

## 2020-09-06 RX ORDER — ONDANSETRON 2 MG/ML
4 INJECTION INTRAMUSCULAR; INTRAVENOUS EVERY 6 HOURS PRN
Status: DISCONTINUED | OUTPATIENT
Start: 2020-09-06 | End: 2020-09-07

## 2020-09-06 RX ORDER — FENTANYL CITRATE 50 UG/ML
50-100 INJECTION, SOLUTION INTRAMUSCULAR; INTRAVENOUS
Status: DISCONTINUED | OUTPATIENT
Start: 2020-09-06 | End: 2020-09-07

## 2020-09-06 RX ORDER — CITRIC ACID/SODIUM CITRATE 334-500MG
30 SOLUTION, ORAL ORAL ONCE
Status: DISCONTINUED | OUTPATIENT
Start: 2020-09-06 | End: 2020-09-07

## 2020-09-06 RX ORDER — METHYLERGONOVINE MALEATE 0.2 MG/ML
200 INJECTION INTRAVENOUS
Status: DISCONTINUED | OUTPATIENT
Start: 2020-09-06 | End: 2020-09-07

## 2020-09-06 RX ORDER — TERBUTALINE SULFATE 1 MG/ML
0.25 INJECTION, SOLUTION SUBCUTANEOUS
Status: DISCONTINUED | OUTPATIENT
Start: 2020-09-06 | End: 2020-09-07

## 2020-09-06 RX ORDER — EPHEDRINE SULFATE 50 MG/ML
5 INJECTION, SOLUTION INTRAMUSCULAR; INTRAVENOUS; SUBCUTANEOUS
Status: DISCONTINUED | OUTPATIENT
Start: 2020-09-06 | End: 2020-09-07

## 2020-09-06 RX ADMIN — SODIUM CHLORIDE, POTASSIUM CHLORIDE, SODIUM LACTATE AND CALCIUM CHLORIDE 1000 ML: 600; 310; 30; 20 INJECTION, SOLUTION INTRAVENOUS at 23:43

## 2020-09-06 RX ADMIN — Medication 25 MCG: at 19:34

## 2020-09-06 RX ADMIN — Medication 25 MCG: at 14:50

## 2020-09-06 RX ADMIN — Medication 25 MCG: at 17:00

## 2020-09-06 NOTE — H&P
[facility]  OB History and Physical      Mark Cervantes MRN# [id number]   Age: 35 year old YOB: 1985     CC:  IOL for AMA    HPI:  Mark Cervantes is a 35 year old  at 40w0d by LMP, who presents for IOL for AMA.  She has mild contractions, but denies vaginal bleeding, and loss of fluid. Reports normal fetal movement.    Patient's pregnancy is complicated by GDMA1. Her fasting BG this morning was 96 and postprandial was 140, per patient. She has no history of shoulder dystocias, her largest baby was 8 lbs, 5 oz. However, she states this baby feels larger than her others and has shown on imaging to likely be ~4500g and has AC >97 %ile. She has had gestational diabetes during each of her pregnancies.     She also has a history of postpartum hypertension that was treated with labetalol. Patient states she developed high blood pressures in the postpartum period after her last child. BP is normal on admission today.    GBS-  Posterior placenta    Pregnancy Complications:  1. AMA  2. GDMA1  3. Hep B non-immune  4. Hep C antibody positive, Hep C RNA not detected  5. Hx of postpartum hypertension  6. Late presentation for care    Prenatal Labs:   Lab Results   Component Value Date    ABO O 2020    RH Pos 2020    AS Neg 2020    HEPBANG Nonreactive 2020    CHPCRT Negative 2020    GCPCRT Negative 2020    TREPAB Negative 2018    RUBELLAABIGG immune 2015    HGB 10.7 (L) 07/10/2020    HIV Negative 2013       GBS Status:   Lab Results   Component Value Date    GBS Negative 2020       OB History  OB History    Para Term  AB Living   5 4 4 0 0 4   SAB TAB Ectopic Multiple Live Births   0 0 0 0 4      # Outcome Date GA Lbr Bryce/2nd Weight Sex Delivery Anes PTL Lv   5 Current            4 Term 18 41w1d 02:00 / 00:19 3.78 kg (8 lb 5.3 oz) M Vag-Spont EPI N JIM      Name: MILI,BABYMiguelito JEWELL      Apgar1: 9  Apgar5: 9   3  Term 09/25/15 39w3d 03:30 / 00:11 3.74 kg (8 lb 3.9 oz) F Vag-Spont EPI  JIM      Apgar1: 6  Apgar5: 9   2 Term 10/23/13 39w4d 05:40 / 00:38 3.465 kg (7 lb 10.2 oz) F Vag-Spont EPI  JIM      Name: ROSENDO CERVANTES      Apgar1: 9  Apgar5: 9   1 Term 12 39w6d 15:00 / 04:33 3.459 kg (7 lb 10 oz) F Vag-Vacuum EPI, Local N JIM      Name: ROSENDO CERVANTES      Apgar1: 8  Apgar5: 9      Obstetric Comments   Hx GDM with all pregnancies.  HTN after delivery in 2018-required medication.  No PPH.        PMHx:   Past Medical History:   Diagnosis Date     Anemia      Diabetes mellitus (H)     Gestational diabetes diet controlled     Tuberculosis     skin test + but chest xray ok     PSHx:   Past Surgical History:   Procedure Laterality Date     GYN SURGERY      ovarian cyst     Meds:   No current outpatient medications on file.      Allergies:  No Known Allergies   FmHx:   Family History   Problem Relation Age of Onset     Eye Surgery No family hx of      SocHx:  She denies any tobacco, alcohol, or other drug use during this pregnancy.    ROS:   Complete 10-point ROS negative except as noted in HPI.She denies headache, blurry vision, chest pain, shortness of breath, RUQ pain, nausea, vomiting, dysuria, hematuria but endorses some extremity edema.    PE:  Vit:   Patient Vitals for the past 4 hrs:   BP Temp Temp src Resp   20 1254 127/73 98.9  F (37.2  C) Oral 16      Gen: Well-appearing, NAD, comfortable   CV: No increased work of breathing  Pulm: Breathing comfortably  Abd: Soft, gravid, non-tender  Ext: Mild LE edema b/l  Cx: 1/30%/-3    Pres:  Cephalic by US  EFW:  9 lbs by Leopold's  Memb: Intact              FHT: Baseline 150, mod variability, + accelerations, no decelerations   [place]: 2 contractions in 10 minutes      Assessment:  Mark Cervantes is a 35 year old , at 40w0d by LMP, who presents for IOL for AMA.    Plan  Admit to L&D  Labor: Anticipate normal vaginal delivery  FWB: Category 1 FHT.   Continue EFM and toco  Pain: Desires epidural for analgesia  PNC: Rh positive, Rubella immune, GBS neg, GCT not done, has GDMA1, Placenta posterior  Fen/GI: Clear liquid diet, IVF    IOL: Counseled patient about shoulder dystocia in the setting of GDM. Discussed risk of CP, neurological injury, and bone fractures in baby in event of shoulder dystocia. Also discussed that emergent CS has higher risk of complication. Offered . Patient states she understands the risks and would like to continue with IOL at this time. Plan to give oral miso for cervical ripening and place cook balloon.     The patient was discussed with Dr. Richards who is in agreement with the treatment plan.    Brennen Dodson MD  Obstetrics, Gynecology, and Women's Health  PGY1    [facility] staff:  Appreciate note by Dr. Dodson.  I have seen and examined the patient without the resident. I have reviewed, edited, and agree with the note.      Shira Richards MD, FACOG

## 2020-09-06 NOTE — PROGRESS NOTES
"Feeling well. Ready for delivery.  She wants to be induced, if not in labor by due date.      /74 (BP Location: Right arm, Patient Position: Chair)   Pulse 78   Ht 1.689 m (5' 6.5\")   Wt 96.9 kg (213 lb 11.2 oz)   LMP 2019   BMI 33.98 kg/m    See flow    A/p: 34 yo  at 39+3 wks, preg c/b Hep C, GDM, LGA.    1. PNC: utd. GBS neg.      2. GDM no meds; all BS within goal per pt.  Uls today w/ LGA baby, AC>97%.  4300 g EFW.    3. Hep C.  Will order Hep C RNA on admit .    4. IOL scheduled on - 40 wks.     Shira Richards MD, FACOG  [facility] Staff  OB/GYN    2020  1:16 PM      "

## 2020-09-06 NOTE — PROGRESS NOTES
[facility]  Labor Progress Note    S:  Patient doing well; not feeling her contractions.    O:   Patient Vitals for the past 4 hrs:   BP Temp Temp src Pulse Resp   20 1515 112/68 98.2  F (36.8  C) Oral 78 16     SVE: 1.5/70/-3, davis balloon with 70cc placed    FHT: Baseline 130, moderate variability, + accelerations, - decelerations  [place]: 2-3 contractions in 10 minutes    A/P:  Ms. Mark Cervantes is a 35 year old  at 40w0d, here for IOL for AMA. Pregnancy complicated by GDMA1, hx of PPH, hx of pre-E, hep C ab positive, hep C RNA neg, hep B NI. Still in latent labor. S/p davis balloon placement.    Labor: - /-3 (1400) > PO Miso x 3 (193)> 1.5/70/-3, davis 70cc (). Planning epidural prior to AROM as patient delivers quickly after AROM  FWB: - Category 1 FHT, reactive and reassuring  PNC: - Rh pos, Rubella immune, GBS neg. Hep B NI (order vaccine postpartum)    # GDMA1  - US  showed EFW 4319g, AC >97%tile. Discussed risk of shoulder dystocia and offered . Patient declined ; will avoid vacuum for delivery  - QID blood glucose checks in latent labor  - q1hr glucose checks in active labor  - insulin gtt in active labor prn    Myron Lucero MD  OB/GYN PGY-2  20 8:25 PM    [facility] staff:  Appreciate note by Dr. Lucero.  I have seen and examined the patient without the resident. I have reviewed, edited, and agree with the note.        Shira Richards MD, FACOG  9/10/2020  2:35 PM

## 2020-09-07 LAB
GLUCOSE BLDC GLUCOMTR-MCNC: 123 MG/DL (ref 70–99)
GLUCOSE BLDC GLUCOMTR-MCNC: 176 MG/DL (ref 70–99)
GLUCOSE BLDC GLUCOMTR-MCNC: 82 MG/DL (ref 70–99)
GLUCOSE BLDC GLUCOMTR-MCNC: 88 MG/DL (ref 70–99)
GLUCOSE BLDC GLUCOMTR-MCNC: 92 MG/DL (ref 70–99)
GLUCOSE BLDC GLUCOMTR-MCNC: 93 MG/DL (ref 70–99)
GLUCOSE BLDC GLUCOMTR-MCNC: 94 MG/DL (ref 70–99)
GLUCOSE BLDC GLUCOMTR-MCNC: 95 MG/DL (ref 70–99)
GLUCOSE BLDC GLUCOMTR-MCNC: 97 MG/DL (ref 70–99)

## 2020-09-07 PROCEDURE — 25000128 H RX IP 250 OP 636: Performed by: STUDENT IN AN ORGANIZED HEALTH CARE EDUCATION/TRAINING PROGRAM

## 2020-09-07 PROCEDURE — 12000001 ZZH R&B MED SURG/OB UMMC

## 2020-09-07 PROCEDURE — 3E0R33Z INTRODUCTION OF ANTI-INFLAMMATORY INTO SPINAL CANAL, PERCUTANEOUS APPROACH: ICD-10-PCS | Performed by: STUDENT IN AN ORGANIZED HEALTH CARE EDUCATION/TRAINING PROGRAM

## 2020-09-07 PROCEDURE — 25800030 ZZH RX IP 258 OP 636

## 2020-09-07 PROCEDURE — 10907ZC DRAINAGE OF AMNIOTIC FLUID, THERAPEUTIC FROM PRODUCTS OF CONCEPTION, VIA NATURAL OR ARTIFICIAL OPENING: ICD-10-PCS | Performed by: OBSTETRICS & GYNECOLOGY

## 2020-09-07 PROCEDURE — 0HQ9XZZ REPAIR PERINEUM SKIN, EXTERNAL APPROACH: ICD-10-PCS | Performed by: OBSTETRICS & GYNECOLOGY

## 2020-09-07 PROCEDURE — 25000125 ZZHC RX 250: Performed by: STUDENT IN AN ORGANIZED HEALTH CARE EDUCATION/TRAINING PROGRAM

## 2020-09-07 PROCEDURE — 90746 HEPB VACCINE 3 DOSE ADULT IM: CPT | Performed by: STUDENT IN AN ORGANIZED HEALTH CARE EDUCATION/TRAINING PROGRAM

## 2020-09-07 PROCEDURE — 72200001 ZZH LABOR CARE VAGINAL DELIVERY SINGLE

## 2020-09-07 PROCEDURE — 00000146 ZZHCL STATISTIC GLUCOSE BY METER IP

## 2020-09-07 PROCEDURE — 3E0R3BZ INTRODUCTION OF ANESTHETIC AGENT INTO SPINAL CANAL, PERCUTANEOUS APPROACH: ICD-10-PCS | Performed by: STUDENT IN AN ORGANIZED HEALTH CARE EDUCATION/TRAINING PROGRAM

## 2020-09-07 PROCEDURE — 25000125 ZZHC RX 250

## 2020-09-07 PROCEDURE — 25000132 ZZH RX MED GY IP 250 OP 250 PS 637: Performed by: STUDENT IN AN ORGANIZED HEALTH CARE EDUCATION/TRAINING PROGRAM

## 2020-09-07 RX ORDER — HYDROCORTISONE 2.5 %
CREAM (GRAM) TOPICAL 3 TIMES DAILY PRN
Status: DISCONTINUED | OUTPATIENT
Start: 2020-09-07 | End: 2020-09-08 | Stop reason: HOSPADM

## 2020-09-07 RX ORDER — BISACODYL 10 MG
10 SUPPOSITORY, RECTAL RECTAL DAILY PRN
Status: DISCONTINUED | OUTPATIENT
Start: 2020-09-09 | End: 2020-09-08 | Stop reason: HOSPADM

## 2020-09-07 RX ORDER — OXYTOCIN/0.9 % SODIUM CHLORIDE 30/500 ML
1-24 PLASTIC BAG, INJECTION (ML) INTRAVENOUS CONTINUOUS
Status: DISCONTINUED | OUTPATIENT
Start: 2020-09-07 | End: 2020-09-07

## 2020-09-07 RX ORDER — OXYTOCIN 10 [USP'U]/ML
INJECTION, SOLUTION INTRAMUSCULAR; INTRAVENOUS
Status: DISCONTINUED
Start: 2020-09-07 | End: 2020-09-07 | Stop reason: HOSPADM

## 2020-09-07 RX ORDER — MISOPROSTOL 200 UG/1
800 TABLET ORAL
Status: DISCONTINUED | OUTPATIENT
Start: 2020-09-07 | End: 2020-09-08 | Stop reason: HOSPADM

## 2020-09-07 RX ORDER — TERBUTALINE SULFATE 1 MG/ML
0.25 INJECTION, SOLUTION SUBCUTANEOUS
Status: DISCONTINUED | OUTPATIENT
Start: 2020-09-07 | End: 2020-09-07

## 2020-09-07 RX ORDER — METHYLERGONOVINE MALEATE 0.2 MG/ML
200 INJECTION INTRAVENOUS
Status: DISCONTINUED | OUTPATIENT
Start: 2020-09-07 | End: 2020-09-08 | Stop reason: HOSPADM

## 2020-09-07 RX ORDER — LIDOCAINE HYDROCHLORIDE 10 MG/ML
INJECTION, SOLUTION EPIDURAL; INFILTRATION; INTRACAUDAL; PERINEURAL
Status: DISCONTINUED
Start: 2020-09-07 | End: 2020-09-07 | Stop reason: HOSPADM

## 2020-09-07 RX ORDER — OXYTOCIN/0.9 % SODIUM CHLORIDE 30/500 ML
340 PLASTIC BAG, INJECTION (ML) INTRAVENOUS CONTINUOUS PRN
Status: DISCONTINUED | OUTPATIENT
Start: 2020-09-07 | End: 2020-09-08 | Stop reason: HOSPADM

## 2020-09-07 RX ORDER — OXYTOCIN/0.9 % SODIUM CHLORIDE 30/500 ML
100 PLASTIC BAG, INJECTION (ML) INTRAVENOUS CONTINUOUS
Status: DISCONTINUED | OUTPATIENT
Start: 2020-09-07 | End: 2020-09-08 | Stop reason: HOSPADM

## 2020-09-07 RX ORDER — IBUPROFEN 800 MG/1
800 TABLET, FILM COATED ORAL EVERY 6 HOURS PRN
Status: DISCONTINUED | OUTPATIENT
Start: 2020-09-07 | End: 2020-09-08 | Stop reason: HOSPADM

## 2020-09-07 RX ORDER — ACETAMINOPHEN 325 MG/1
650 TABLET ORAL EVERY 6 HOURS PRN
Qty: 100 TABLET | Refills: 0 | Status: SHIPPED | OUTPATIENT
Start: 2020-09-07 | End: 2020-11-16

## 2020-09-07 RX ORDER — AMOXICILLIN 250 MG
1 CAPSULE ORAL 2 TIMES DAILY
Status: DISCONTINUED | OUTPATIENT
Start: 2020-09-07 | End: 2020-09-08 | Stop reason: HOSPADM

## 2020-09-07 RX ORDER — MODIFIED LANOLIN
OINTMENT (GRAM) TOPICAL
Status: DISCONTINUED | OUTPATIENT
Start: 2020-09-07 | End: 2020-09-08 | Stop reason: HOSPADM

## 2020-09-07 RX ORDER — CARBOPROST TROMETHAMINE 250 UG/ML
250 INJECTION, SOLUTION INTRAMUSCULAR
Status: DISCONTINUED | OUTPATIENT
Start: 2020-09-07 | End: 2020-09-08 | Stop reason: HOSPADM

## 2020-09-07 RX ORDER — IBUPROFEN 600 MG/1
600 TABLET, FILM COATED ORAL EVERY 6 HOURS PRN
Qty: 60 TABLET | Refills: 0 | Status: SHIPPED | OUTPATIENT
Start: 2020-09-07 | End: 2020-11-16

## 2020-09-07 RX ORDER — AMOXICILLIN 250 MG
2 CAPSULE ORAL 2 TIMES DAILY
Status: DISCONTINUED | OUTPATIENT
Start: 2020-09-07 | End: 2020-09-08 | Stop reason: HOSPADM

## 2020-09-07 RX ORDER — LIDOCAINE 40 MG/G
CREAM TOPICAL
Status: DISCONTINUED | OUTPATIENT
Start: 2020-09-07 | End: 2020-09-07

## 2020-09-07 RX ORDER — MISOPROSTOL 200 UG/1
TABLET ORAL
Status: DISCONTINUED
Start: 2020-09-07 | End: 2020-09-07 | Stop reason: HOSPADM

## 2020-09-07 RX ORDER — LIDOCAINE HYDROCHLORIDE AND EPINEPHRINE 15; 5 MG/ML; UG/ML
INJECTION, SOLUTION EPIDURAL PRN
Status: DISCONTINUED | OUTPATIENT
Start: 2020-09-07 | End: 2020-09-09 | Stop reason: HOSPADM

## 2020-09-07 RX ORDER — ACETAMINOPHEN 325 MG/1
650 TABLET ORAL EVERY 4 HOURS PRN
Status: DISCONTINUED | OUTPATIENT
Start: 2020-09-07 | End: 2020-09-08 | Stop reason: HOSPADM

## 2020-09-07 RX ORDER — AMOXICILLIN 250 MG
1 CAPSULE ORAL DAILY
Qty: 100 TABLET | Refills: 0 | Status: SHIPPED | OUTPATIENT
Start: 2020-09-07 | End: 2020-11-16

## 2020-09-07 RX ORDER — OXYTOCIN 10 [USP'U]/ML
10 INJECTION, SOLUTION INTRAMUSCULAR; INTRAVENOUS
Status: DISCONTINUED | OUTPATIENT
Start: 2020-09-07 | End: 2020-09-08 | Stop reason: HOSPADM

## 2020-09-07 RX ORDER — NALOXONE HYDROCHLORIDE 0.4 MG/ML
.1-.4 INJECTION, SOLUTION INTRAMUSCULAR; INTRAVENOUS; SUBCUTANEOUS
Status: DISCONTINUED | OUTPATIENT
Start: 2020-09-07 | End: 2020-09-08 | Stop reason: HOSPADM

## 2020-09-07 RX ADMIN — Medication: at 01:07

## 2020-09-07 RX ADMIN — Medication: at 08:48

## 2020-09-07 RX ADMIN — SODIUM CHLORIDE, POTASSIUM CHLORIDE, SODIUM LACTATE AND CALCIUM CHLORIDE: 600; 310; 30; 20 INJECTION, SOLUTION INTRAVENOUS at 01:10

## 2020-09-07 RX ADMIN — DOCUSATE SODIUM AND SENNOSIDES 2 TABLET: 8.6; 5 TABLET, FILM COATED ORAL at 12:22

## 2020-09-07 RX ADMIN — Medication 7 ML: at 01:06

## 2020-09-07 RX ADMIN — IBUPROFEN 800 MG: 800 TABLET ORAL at 18:07

## 2020-09-07 RX ADMIN — Medication 340 ML/HR: at 09:09

## 2020-09-07 RX ADMIN — LIDOCAINE HYDROCHLORIDE,EPINEPHRINE BITARTRATE 3 ML: 15; .005 INJECTION, SOLUTION EPIDURAL; INFILTRATION; INTRACAUDAL; PERINEURAL at 00:59

## 2020-09-07 RX ADMIN — IBUPROFEN 800 MG: 800 TABLET ORAL at 11:16

## 2020-09-07 RX ADMIN — DOCUSATE SODIUM AND SENNOSIDES 1 TABLET: 8.6; 5 TABLET, FILM COATED ORAL at 21:14

## 2020-09-07 RX ADMIN — Medication 2 MILLI-UNITS/MIN: at 03:46

## 2020-09-07 RX ADMIN — HEPATITIS B VACCINE (RECOMBINANT) 20 MCG: 20 INJECTION, SUSPENSION INTRAMUSCULAR at 12:22

## 2020-09-07 NOTE — ANESTHESIA PREPROCEDURE EVALUATION
"Anesthesia Pre-Procedure Evaluation    Patient: Mark Cervantes   MRN:     [id number] Gender:   female   Age:    35 year old :      1985        Preoperative Diagnosis: * No surgery found *        LABS:  CBC:   Lab Results   Component Value Date    WBC 9.8 2020    WBC 9.0 07/10/2020    HGB 10.9 (L) 2020    HGB 10.7 (L) 07/10/2020    HCT 34.4 (L) 2020    HCT 33.6 (L) 07/10/2020     2020     07/10/2020     BMP:   Lab Results   Component Value Date     2020     10/23/2013    POTASSIUM 3.8 2020    POTASSIUM 3.7 10/23/2013    CHLORIDE 107 2020    CHLORIDE 108 10/23/2013    CO2 22 2020    CO2 23 10/23/2013    BUN 6 (L) 2020    BUN 9 2018    CR 0.34 (L) 2020    CR 0.51 (L) 2018    GLC 90 2020    GLC 90 2018     COAGS: No results found for: PTT, INR, FIBR  POC:   Lab Results   Component Value Date    BGM 78 2020    HCG Positive (A) 2011     OTHER:   Lab Results   Component Value Date    A1C 5.6 2020    SHERIDAN 8.7 2020    ALT 19 2020    AST 20 2020    TSH 0.75 2019        Preop Vitals    BP Readings from Last 3 Encounters:   20 109/65   20 110/74   20 101/69    Pulse Readings from Last 3 Encounters:   20 78   20 78   20 91      Resp Readings from Last 3 Encounters:   20 16   19 14   18 16    SpO2 Readings from Last 3 Encounters:   18 97%   18 99%   18 96%      Temp Readings from Last 1 Encounters:   20 36.6  C (97.8  F) (Oral)    Ht Readings from Last 1 Encounters:   20 1.689 m (5' 6.5\")      Wt Readings from Last 1 Encounters:   20 96.9 kg (213 lb 11.2 oz)    Estimated body mass index is 33.98 kg/m  as calculated from the following:    Height as of 20: 1.689 m (5' 6.5\").    Weight as of 20: 96.9 kg (213 lb 11.2 oz).     LDA:  Peripheral IV 18 Left Lower forearm (Active) "   Number of days: 916        Past Medical History:   Diagnosis Date     Anemia      Diabetes mellitus (H)     Gestational diabetes diet controlled     Tuberculosis     skin test + but chest xray ok      Past Surgical History:   Procedure Laterality Date     GYN SURGERY      ovarian cyst      No Known Allergies          FAUSTO FV AN PHYSICAL EXAM    Assessment:   ASA SCORE: 2 emergent   H&P: History and physical reviewed and following examination; no interval change.    NPO Status: NPO Appropriate     Plan:   Anes. Type:  Epidural     Epidural Details:  Catheter; Lumbar   Pre-Medication: None   Induction:  N/a   Airway: Native Airway   Access/Monitoring: PIV   Maintenance: N/a     Postop Plan:   Postop Pain: Regional  Postop Sedation/Airway: Not planned  Disposition: Inpatient/Admit     PONV Management: Adult Risk Factors: Female     CONSENT: Direct conversation   Plan and risks discussed with: Patient                      Silvino Holguin MD     ANESTHESIA PREOP EVALUATION    PROCEDURE: Labor Epidural Catheter    HPI: Mark Cervantes is a 35 year old female who presents for above procedure 2/2 labor pain.    PAST MEDICAL HISTORY:    Past Medical History:   Diagnosis Date     Anemia      Diabetes mellitus (H)     Gestational diabetes diet controlled     Tuberculosis     skin test + but chest xray ok       PAST SURGICAL HISTORY:    Past Surgical History:   Procedure Laterality Date     GYN SURGERY      ovarian cyst       SOCIAL HISTORY:       Social History     Tobacco Use     Smoking status: Never Smoker     Smokeless tobacco: Never Used   Substance Use Topics     Alcohol use: No       ALLERGIES:     No Known Allergies    MEDICATIONS:     Medications Prior to Admission   Medication Sig Dispense Refill Last Dose     Ferrous Sulfate (IRON PO)    Past Month at Unknown time     Prenat-Fe Carbonyl-FA-Omega 3 (ONE-A-DAY WOMENS PRENATAL 1 PO) Take by mouth daily   Past Month at Unknown time     VITAMIN D PO    Past Month at  Unknown time     blood glucose (NO BRAND SPECIFIED) test strip Use to test blood sugar 4 times daily or as directed. 100 strip 3      blood glucose monitoring (ACCU-CHEK MULTICLIX) lancets Test 4 times daily. 100 each 4      blood glucose monitoring (NO BRAND SPECIFIED) meter device kit Use to test blood sugar 4 times daily or as directed. 1 kit 0        No current outpatient medications on file.       Current Facility-Administered Medications Ordered in Epic   Medication Dose Route Frequency Provider Last Rate Last Dose     acetaminophen (TYLENOL) tablet 650 mg  650 mg Oral Q4H PRN Tavia Guerrero MD         carboprost (HEMABATE) injection 250 mcg  250 mcg Intramuscular Once PRN Tavia Guerrero MD         glucose gel 15-30 g  15-30 g Oral Q15 Min PRN Tavia Guerrero MD        Or     dextrose 50 % injection 25-50 mL  25-50 mL Intravenous Q15 Min PRN Tavia Guerrero MD        Or     glucagon injection 1 mg  1 mg Subcutaneous Q15 Min PRTavia Vance MD         fentaNYL (PF) (SUBLIMAZE) injection  mcg   mcg Intravenous Q1H PRN Tavia Guerrero MD         ibuprofen (ADVIL/MOTRIN) tablet 800 mg  800 mg Oral Once PRN Tavia Guerrero MD         lactated ringers BOLUS 1,000 mL  1,000 mL Intravenous Once PRN Tavia Guerrero MD        Or     lactated ringers BOLUS 500 mL  500 mL Intravenous Once PRN Tavia Guerrero MD         lactated ringers BOLUS 500 mL  500 mL Intravenous Once PRTavia Vance MD         lactated ringers infusion   Intravenous Continuous Tavia Guerrero MD   Stopped at 09/06/20 1400     lidocaine 1 % 0.1-20 mL  0.1-20 mL Subcutaneous Once PRTavia Vance MD         Medication Instructions - cervical ripening and induction medications   Does not apply Continuous Tavia Ireland MD         Medication Instructions - cervical ripening and induction medications   Does not apply Continuous Tavia Ireland MD         Medication  "Instructions: misoprostol (CYTOTEC)- Nurse to discuss ordering with provider, if needed. Ordered via \"OB misoprostol (CYTOTEC) Postpartum Hemorrhage PANEL\"   Does not apply Continuous PRN Tavia Guerrero MD         methylergonovine (METHERGINE) injection 200 mcg  200 mcg Intramuscular Once PRN Tavia Guerrero MD         metoclopramide (REGLAN) injection 10 mg  10 mg Intravenous Q6H PRN Tavia Guerrero MD         misoprostol (cervical ripening) (CYTOTEC) quarter-tab 25 mcg  25 mcg Oral Q2H PRN Tavia Guerrero MD   25 mcg at 09/06/20 1934     naloxone (NARCAN) injection 0.1-0.4 mg  0.1-0.4 mg Intravenous Q2 Min PRN Tavia Guerrero MD         nitrous oxide/oxygen 50/50 blend   Inhalation Continuous PRN Tavia Guerrero MD         ondansetron (ZOFRAN) injection 4 mg  4 mg Intravenous Q6H PRN Tavia Guerrero MD         oxyCODONE-acetaminophen (PERCOCET) 5-325 MG per tablet 1 tablet  1 tablet Oral Once PRN Tavia Guerrero MD         oxytocin (PITOCIN) 30 units in 500 mL 0.9% NaCl infusion  100-340 mL/hr Intravenous Continuous PRN Tavia Guerrero MD         oxytocin (PITOCIN) injection 10 Units  10 Units Intramuscular Once PRN Tavia Guerrero MD         prochlorperazine (COMPAZINE) injection 10 mg  10 mg Intravenous Q6H PRN Tavia Guerrero MD        Or     prochlorperazine (COMPAZINE) suppository 25 mg  25 mg Rectal Q12H PRN Tavia Guerrero MD         sodium citrate-citric acid (BICITRA) solution 30 mL  30 mL Oral Once Tavia Guerrero MD   Stopped at 09/06/20 1400     terbutaline (BRETHINE) injection 0.25 mg  0.25 mg Subcutaneous Once PRN Tavia Guerrero MD         tranexamic acid (CYKLOKAPRON) bolus 1 g vial attach to NaCl 50 or 100 mL bag ADULT  1 g Intravenous Q30 Min PRN Tavia Guerrero MD         No current [facility]-ordered outpatient medications on file.       PHYSICAL EXAM:    Vitals: T 97.8, P 78, /65, R 16, SpO2  , Weight   Wt Readings from Last 2 " Encounters:   09/02/20 96.9 kg (213 lb 11.2 oz)   08/26/20 97 kg (213 lb 12.8 oz)       See doc flowsheet    NPO STATUS: see doc flowsheet    LABS:    BMP:  Recent Labs   Lab Test 03/31/20  1045      POTASSIUM 3.8   CHLORIDE 107   CO2 22   BUN 6*   CR 0.34*   GLC 90   SHERIDAN 8.7       LFTs:   Recent Labs   Lab Test 03/31/20  1045   AST 20   ALT 19       CBC:   Recent Labs   Lab Test 09/06/20  1420   WBC 9.8   RBC 4.40   HGB 10.9*   HCT 34.4*   MCV 78   MCH 24.8*   MCHC 31.7   RDW 16.8*          Coags:  No results for input(s): INR, PTT, FIBR in the last 91481 hours.    Imaging:  No orders to display       Silvino Holguin MD  Anesthesiology Staff  Pager [phone]    9/6/2020  10:22 PM

## 2020-09-07 NOTE — L&D DELIVERY NOTE
OB Vaginal Delivery Note    Mark Cervantes MRN# [id number]   Age: 35 year old YOB: 1985     Delivery Summary    Mark Cervantes is a 35 year old now  at 40w1d, admitted for IOL in pregnancy complicated by A1 (v. A2) GDM. She was admitted and started induction of labor with misoprostol (three total doses) and then had an intracervical davis placed. She received an epidural for pain control, had pitocin administered, and progressed to complete dilation, pushed and with good maternal effort delivered a female infant on 20 at 0908 AM from the IMELDA position. Shoulders delivered easily. Infant with spontaneous cry. Placed on mother's chest. Cord clamping delayed 1min. Apgars 9/9, weight 4254 g. IV pitocin started. Placenta delivered with fundal massage, gentle cord traction and suprapubic countertraction; noted to be intact with 3 vessel cord. Small first degree perineal laceration, hemostatic and recommended no suture but reapproximated with figure of X per patient request. Fundus firm and lochia minimal at the end of the case.  mL.    Dr. Edwards was present and gloved for entire delivery and repair.    Nereyda Back MD  Ob/Gyn Resident, PGY-3  20 9:33 AM     I was present during the delivery and supervised the resident perform the patient's  and perineal repair.    Lolita Edwards MD MPH      GA: 40w1d  GP:   Labor Complications: None   EBL:   mL  Delivery QBL:    Delivery Type: Vaginal, Spontaneous   ROM to Delivery Time: (Delivered) Hours: 7 Minutes: 25  [place] Weight:     1 Minute 5 Minute 10 Minute   Apgar Totals: 9   9        NEREYDA BACK        Labor Event Times    Labor onset date:  20 Onset time:   1:00 AM   Dilation complete date:  20 Complete time:   8:55 AM   Start pushing date/time:  2020 0858      Labor Length    1st Stage (hrs):  7 (min):  55   2nd Stage (hrs):  0 (min):  13   3rd Stage (hrs):  0 (min):  6      Rupture  date/time: 20 0143   Rupture type:  Artificial Rupture of Membranes  Fluid color:  Clear     1:1 continuous labor support provided by?:  RN       Delivery/Placenta Date and Time    Delivery Date:  20 Delivery Time:   9:08 AM   Placenta Date/Time:  2020  9:14 AM  Oxytocin given at the time of delivery:  after delivery of baby     Vaginal Counts     Initial count performed by 2 team members:   Two Team Members   brown sims       Needles Suture [place] Sponges Instruments   Initial counts 2 0 5    Added to count       Final counts       Placed during labor Accounted for at the end of labor   No NA   No NA   No NA    Final count performed by 2 team members:   Two Team Members   brown Sims         Apgars    Living status:  Living   1 Minute 5 Minute 10 Minute 15 Minute 20 Minute   Skin color: 1  1       Heart rate: 2  2       Reflex irritability: 2  2       Muscle tone: 2  2       Respiratory effort: 2  2       Total: 9  9       Apgars assigned by:  JOAQUIN ARMENDARIZ RN     Cord    Vessels:  3 Vessels Complications:  Knot   Cord Blood Disposition:  Lab Gases Sent?:  No      [place] Resuscitation    Methods:  None  [place] Care at Delivery:  Infant with spontaneous cry to mothers' abdomen where she was further dried and stimulated     Skin to Skin and Feeding Plan    Skin to skin initiation date/time: 1841    Skin to skin with:  Mother  Skin to skin end date/time:     How do you plan to feed your baby:  Breastfeeding     Labor Events and Shoulder Dystocia    Fetal Tracing Prior to Delivery:  Category 2  Shoulder dystocia present?:  Neg     Delivery (Maternal) (Provider to Complete) (705303)    Episiotomy:  None  Perineal lacerations:  1st Repaired?:  Yes      Blood Loss  Mother: Mark Cervantes #[id number]   Start of Mother's Information    IO Blood Loss  20 0100 - 20 0933    None           End of Mother's Information  Mother: Mark Cervantes #[id number]          Delivery - Provider to Complete (038046)    Delivering clinician:  Lolita Edwards MD  Attempted Delivery Types (Choose all that apply):  Spontaneous Vaginal Delivery  Delivery Type (Choose the 1 that will go to the Birth History):  Vaginal, Spontaneous   Other personnel:   Provider Role   Yas Maguire MD Resident         Placenta    Delayed Cord Clamping:  Done  Date/Time:  9/7/2020  9:14 AM  Removal:  Spontaneous  Disposition:  Hospital disposal     Anesthesia    Method:  Epidural          Presentation and Position    Presentation:  Vertex  Position:  Left Occiput Anterior           Yas Maguire MD

## 2020-09-07 NOTE — PROGRESS NOTES
[facility]  Labor Progress Note    S:  Patient doing well; contractions are getting stronger.    O:   /65, HR 61  SVE: /-3, davis balloon removed    FHT: Baseline 150, moderate variability, + accelerations, intermittent variable decels to ezequiel of 110  [place]: 3-4 contractions in 10 minutes    A/P:  Ms. Mark Cervantes is a 35 year old  at 40w0d, here for IOL for AMA. Pregnancy complicated by GDMA1, hx of PPH, hx of pre-E, hep C ab positive, hep C RNA neg, hep B NI. Labor progressing as expected.    Labor: - /-3 (1400) > PO Miso x 3 (193)> 1./-3, davis 70cc (). Discussed options including augmentation with pitocin vs AROM as next step. Patient opts for AROM. Planning epidural prior to AROM as patient delivers quickly after AROM  FWB: - Category 2 FHT due to intermittent variable decels though overall reactive and reassuring. Will administer fluid bolus  PNC: - Rh pos, Rubella immune, GBS neg. Hep B NI (order vaccine postpartum)    # GDMA1  - US  showed EFW 4319g, AC >97%tile. Discussed risk of shoulder dystocia and offered . Patient declined ; will avoid vacuum for delivery  - QID blood glucose checks in latent labor  - q1hr glucose checks in active labor  - insulin gtt in active labor dominik Lucero MD  OB/GYN PGY-1  20 11:36 PM

## 2020-09-07 NOTE — PLAN OF CARE
Patient's labor progressed well. Pt was feeling pushy since and was found to be complete at 0855, pt pushed once and the head was . Dr. Maguire was at the bedside. Pt was instructed to stop pushing and we paged Dr. Edwards for delivery. Pt pushed one more time and a viable baby girl was delivered at 0908. Both mom and baby are recovering well. Anticipate transfer to [facility].

## 2020-09-07 NOTE — PLAN OF CARE
Data: Mark Cervantes transferred to [facility] via wheelchair at 1140. Baby transferred via crib.  Action: Receiving unit notified of transfer: Yes. Patient and family notified of room change. Report given to Sarah US at 1145. Belongings sent to receiving unit. Accompanied by Registered Nurse. Oriented patient to surroundings. Call light within reach. ID bands double-checked with receiving RN.  Response: Patient tolerated transfer and is stable.

## 2020-09-07 NOTE — PROGRESS NOTES
[facility]  Labor Progress Note    S:  Comfortable with epidural.     O:   100/58, 92  SVE: 7-8/70/0; more cervix present on patient's right side    FHT: Baseline 140, moderate variability, + accelerations, rare variable and late decels  [place]: 3-4 contractions in 10 minutes    A/P:  Ms. Mark Cervantes is a 35 year old  at 40w0d, here for IOL for AMA. Pregnancy complicated by GDMA1, hx of PPH, hx of pre-E, hep C ab positive, hep C RNA neg, hep B NI. Now on pitocin, baby lower in the pelvis.    Labor: - 30/-3 (1400) > PO Miso x 3 (193)> 1.5/70/-3, davis 70cc ()>s/p davis, 5/-3 (2320)> 5-6/70/-2, AROM (0145)> 6/70/-2, pit (0346)>7-8/70/0 (0600). Repositioned patient for side lying release. Epidural in place.  FWB: - Category 2 FHT due to rare variable and late decels, overall reactive and reassuring  PNC: - Rh pos, Rubella immune, GBS neg. Hep B NI (order vaccine postpartum)    # GDMA1  - US  showed EFW 4319g, AC >97%tile. Discussed risk of shoulder dystocia and offered . Patient declined ; will avoid vacuum for delivery  - QID blood glucose checks in latent labor  - q1hr glucose checks in active labor  - insulin gtt in active labor prn    Myron Lucero MD  OB/GYN PGY-2  20 6:59 AM

## 2020-09-07 NOTE — PROGRESS NOTES
[facility]  Labor Progress Note    S:  Patient doing well; waiting for epidural placement    O:   /71, HR 83  SVE: deferred    FHT: Baseline 150, moderate variability, + accelerations, -decels  [place]: 3-4 contractions in 10 minutes    A/P:  Ms. Mark Cervantes is a 35 year old  at 40w0d, here for IOL for AMA. Pregnancy complicated by GDMA1, hx of PPH, hx of pre-E, hep C ab positive, hep C RNA neg, hep B NI. Labor progressing as expected.    Labor: - /-3 (1400) > PO Miso x 3 (193)> 1.5//-3, davis 70cc (). Planning epidural prior to AROM as patient delivers quickly after AROM  FWB: - Category 1 FHT, reactive and reassuring  PNC: - Rh pos, Rubella immune, GBS neg. Hep B NI (order vaccine postpartum)    # GDMA1  - US  showed EFW 4319g, AC >97%tile. Discussed risk of shoulder dystocia and offered . Patient declined ; will avoid vacuum for delivery  - QID blood glucose checks in latent labor  - q1hr glucose checks in active labor  - insulin gtt in active labor prn    Patient seen with Dr. Maurice Lucero MD  OB/GYN PGY-2  20 12:48 AM

## 2020-09-07 NOTE — PROVIDER NOTIFICATION
09/06/20 2004   Provider Notification   Provider Name/Title G2 Myron Nic   Method of Notification At Bedside   Notification Reason SVE   G2 at bedside to discuss SVE and placement of davis balloon. Patient agreed and tolerated placement. Education and discussed informing staff if patient feels leaking or water breaking.

## 2020-09-07 NOTE — ANESTHESIA PROCEDURE NOTES
Epidural Procedure Note  Staff -   Anesthesiologist:  Silvino Holguin MD      Performed By: anesthesiologist          Location: OB     Procedure start time:  9/7/2020 12:45 AM     Procedure end time:  9/7/2020 1:10 AM   Pre-procedure checklist:   patient identified, IV checked, site marked, risks and benefits discussed, informed consent, monitors and equipment checked, pre-op evaluation and at physician/surgeon's request      Correct Patient: Yes      Correct Position: Yes      Correct Site: Yes      Correct Procedure: Yes      Correct Laterality:  Yes    Site Marked:  Yes  Procedure:     Procedure:  Epidural catheter    ASA:  2 and Emergent    Diagnosis:  Labor pain    Position:  Sitting    Sterile Prep: chloraprep      Insertion site:  L3-4    Local skin infiltration:  1% lidocaine    amount (mL):  4    Approach:  Midline    Needle gauge (G):  17    Needle Length (in):  3.5    Block Needle Type:  Corneliusuhverónica    Injection Technique:  LORT saline    RADHA at (cm):  6    Attempts:  1    Redirects:  2    Catheter gauge (G):  19    Catheter threaded easily: Yes      Threaded to cm at skin:  11    Threaded in epidural space (cm):  5    Paresthesias:  No    Aspiration negative for Heme or CSF: Yes      Test dose (mL):  3     Local anesthetic:  Lidocaine 1.5% w/ 1:200,000 epinephrine    Test dose time:  00:59    Test dose negative for signs of intravascular, subdural or intrathecal injection: Yes

## 2020-09-07 NOTE — PLAN OF CARE
Bacterial Vaginosis    You have a vaginal infection called bacterial vaginosis (BV). Both good and bad bacteria are present in a healthy vagina. BV occurs when these bacteria get out of balance. The number of bad bacteria increase. And the number of good bacteria decrease.  BV may or may not cause symptoms. If symptoms do occur, they can include:  · Thin, gray, milky-white, or sometimes green discharge  · Unpleasant odor or fishy smell  · Itching, burning, or pain in or around the vagina  It is not known what causes BV, but certain factors can make the problem more likely. This can include:  · Douching  · Having sex with a new partner  · Having sex with more than one partner  BV will sometimes go away on its own. But treatment is usually recommended. This is because untreated BV can increase the risk of more serious health problems such as:  · Pelvic inflammatory disease (PID)  ·  delivery (giving birth to a baby early if youre pregnant)  · HIV and certain other sexually transmitted diseases (STDs)  · Infection after surgery on the reproductive organs  Home care  General care  · BV is most often treated with medicines called antibiotics. These may be given as pills or as a vaginal cream. If antibiotics are prescribed, be sure to use them exactly as directed. Also, be sure to complete all of the medicine, even if your symptoms go away.  · Avoid douching or having sex during treatment.  · If you have sex with a female partner, ask your healthcare provider if she should also be treated.  Prevention  · Limit or avoid douching.  · Avoid having sex. If you do have sex, then take steps to lower your risk:  ¨ Use condoms when having sex.  ¨ Limit the number of partners you have sex with.  Follow-up care  Follow up with your healthcare provider, or as advised.  When to seek medical advice  Call your healthcare provider right away if:  · You have a fever of 100.4ºF (38ºC) or higher, or as directed by your  Patient arrived to [facility] unit via wheelchair at 1200 ,with belongings, accompanied by family, with infant in arms. Received report from  MAGEN Loya  and checked bands. Unit and room orientation started. Call light within arms reach; no concerns present at this time. Continue with plan of care.      provider.  · Your symptoms worsen, or they dont go away within a few days of starting treatment.  · You have new pain in the lower belly or pelvic region.  · You have side effects that bother you or a reaction to the pills or cream youre prescribed.  · You or any partners you have sex with have new symptoms, such as a rash, joint pain, or sores.  Date Last Reviewed: 7/30/2015  © 5157-8442 [employer]. [address] [place] [address], [place] [address]. All rights reserved. This information is not intended as a substitute for professional medical care. Always follow your healthcare professional's instructions.        Preventing Vaginal Infection  These steps can help you stay comfortable during treatment of a vaginal infection. They also help prevent vaginal infections in the future.  Keeping a healthy balance  Factors that change the normal balance in the vagina can lead to a vaginal infection. To help keep the balance normal, try these tips:  · Change out of wet bathing suits and damp exercise clothes as soon as possible. Yeast thrive in a warm, moist environment.  · Avoid wearing tight pants. Choose cotton underwear and pantyhose that have a cotton crotch. Cotton keeps you cooler and drier than synthetics.  · Don't douche unless directed by your health care provider. Douching can destroy friendly bacteria and change the vagina's normal balance.  · Wipe from front to back after using the toilet. This prevents bacteria from spreading from the anus to the vulva.  · Wash the vulva with mild, unscented soap or with plain water.  · Wash your diaphragm, spermicide applicators, and sex toys with mild soap and water after use. Dry them thoroughly before putting them away.  · Change tampons often (every 2 hours to 4 hours). Leaving a tampon in for too long may disrupt the balance of vaginal bacteria.  · Avoid vaginal sprays, scented toilet paper and soaps, and deodorant tampons or pads, which can cause vaginal  irritation  Staying healthy overall  Good overall health can help you resist infection. To be healthier:  · Help protect yourself from STDs by using latex condoms for intercourse. Ask your health care provider for more information about safer sex.  · Eat a variety of healthy foods.  · Exercise regularly.  · Get enough rest and sleep.  · Maintain a healthy weight. If you need to lose weight, ask your health care provider for advice on how to start.  Date Last Reviewed: 5/18/2015 © 2000-2017 [employer]. [address] [place] [address]. All rights reserved. This information is not intended as a substitute for professional medical care. Always follow your healthcare professional's instructions.        Vaginal Infection: Understanding the Vaginal Environment  The vagina is a canal. It connects the uterus (womb) to the outside of the body. It is home to many types of bacteria and other tiny organisms. These different bacteria most often stay balanced in number. This keeps the vagina healthy. If the balance changes, it can cause infection.   A healthy environment  Many types of bacteria are present in a healthy vagina. When balanced, they dont cause problems. Small amounts of yeast may also be present without causing problems. The most common type of bacteria in the vagina is lactobacillus. It helps keep the vagina at a low pH. A low pH keeps bad bacteria from taking over.  Normal vaginal discharge  The vagina makes fluid. It is sent out as discharge. This also keeps the vagina healthy. Normal discharge can be clear, white, or yellowish. Most women find that normal discharge varies in amount and color through the month.  An unhealthy environment  The vaginal environment may get out of balance. This may result in a vaginal infection. There are a few reasons this can happen. The pH may have changed. The amount of one organism, such as yeast, may increase. Or an outside organism may get into the  vagina and throw off the balance:  · Bacterial vaginosis (BV). BV is due to an imbalance in the normal bacteria in the vagina. Lactobacillus bacteria decrease. As a result, the numbers of bad bacteria increase.  · Candidiasis (yeast infection). Yeast is a type of fungus. A yeast infection occurs when yeast cells in the vagina increase. They then attack vaginal tissues. A type of yeast called Candida albicans is often involved.  · Trichomoniasis (trich). Trich is a parasite. It is passed from one person to another during sex. Men with trich often dont have any symptoms. In women, it can take weeks or months before symptoms appear.  Date Last Reviewed: 3/1/2017  © 3177-4011 [employer]. [address] [place] [address], [place] [address]. All rights reserved. This information is not intended as a substitute for professional medical care. Always follow your healthcare professional's instructions.

## 2020-09-07 NOTE — PLAN OF CARE
VSS and afebrile. Blood glucose checks started due to active labor. Patient resting comfortably with frequent reposition changes and epidural. Peanut ball used for position changes. See doc flow sheet for uterine and FH tracings. Patient has mother at bedside for support person. Labor progressing and patient started feeling pressure this am with repositioning. Will continue to monitor and update provider with any question or concerns

## 2020-09-07 NOTE — DISCHARGE SUMMARY
[facility] Discharge Summary    Mark Cervantes MRN# [id number]   Age: 35 year old YOB: 1985     Date of Admission:  2020  Date of Discharge:  2020  2:12 PM  Admitting Physician:  Shira Richards MD  Discharge Physician:  Angela Duke MD    Admit Dx:   -  at 40w0d  - AMA  - GDMA1 vs GDMA2  - Hep B NI  - Hep C antibody positive, Hep C RNA negative  - Hx PP HTN  - Late presentation to prenatal care    Discharge Dx:  - Same as above, now  s/p     Procedures:  - Spontaneous vaginal delivery  - Epidural analgesia    Admit HPI/Labor Course:  Mark Cervantes is a 35 year old now  at 40w1d, admitted for IOL in pregnancy complicated by A1 (v. A2) GDM. She was admitted and started induction of labor with misoprostol (three total doses) and then had an intracervical davis placed. She received an epidural for pain control, had pitocin administered, and progressed to complete dilation, pushed and with good maternal effort delivered a female infant on 20 at 0908 AM from the IMELDA position. Shoulders delivered easily. Infant with spontaneous cry. Placed on mother's chest. Cord clamping delayed 1min. Apgars 9/9, weight 4254 g. IV pitocin started. Placenta delivered with fundal massage, gentle cord traction and suprapubic countertraction; noted to be intact with 3 vessel cord. Small first degree perineal laceration, hemostatic and recommended no suture but reapproximated with figure of X per patient request. Fundus firm and lochia minimal at the end of the case.  mL.    Please see her Admission H&P and Delivery Summary for further details.    Postpartum Course:  Her postpartum course was uncomplicated. On PPD#1, she was meeting all of her postpartum goals and deemed stable for discharge. She was voiding without difficulty, tolerating a regular diet without nausea and vomiting, her pain was well controlled on oral pain medicines and her lochia  was appropriate. Her hemoglobin prior to delivery was 10.9 and after delivery was 9.6. Her Rh status was positive, and Rhogam was not indicated.     Discharge Medications:  Discharge Medication List as of 9/8/2020 10:48 AM      START taking these medications    Details   acetaminophen (TYLENOL) 325 MG tablet Take 2 tablets (650 mg) by mouth every 6 hours as needed for mild pain Start after Delivery., Disp-100 tablet,R-0, E-Prescribe      ferrous sulfate (FE TABS) 325 (65 Fe) MG EC tablet Take 1 tablet (325 mg) by mouth daily, Disp-90 tablet,R-3, E-Prescribe      ibuprofen (ADVIL/MOTRIN) 600 MG tablet Take 1 tablet (600 mg) by mouth every 6 hours as needed for moderate pain Start after delivery, Disp-60 tablet,R-0, E-Prescribe      senna-docusate (SENOKOT-S/PERICOLACE) 8.6-50 MG tablet Take 1 tablet by mouth daily Start after delivery., Disp-100 tablet,R-0, E-Prescribe         CONTINUE these medications which have NOT CHANGED    Details   blood glucose (NO BRAND SPECIFIED) test strip Use to test blood sugar 4 times daily or as directed., Disp-100 strip,R-3, E-PrescribePlease dispense brand per insurance      blood glucose monitoring (ACCU-CHEK MULTICLIX) lancets Test 4 times daily.Disp-100 each,[id number] dispense whatever brand is covered by patient's insurance.      blood glucose monitoring (NO BRAND SPECIFIED) meter device kit Use to test blood sugar 4 times daily or as directed.Disp-1 kit,[id number] dispense brand per insurance      Ferrous Sulfate (IRON PO) Historical      Prenat-Fe Carbonyl-FA-Omega 3 (ONE-A-DAY WOMENS PRENATAL 1 PO) Take by mouth daily, Historical      VITAMIN D PO Historical             Discharge/Disposition:  Mark Cervantes was discharged to home in stable condition with the following instructions/medications:  1) Call for temperature > 100.4, bright red vaginal bleeding >1 pad an hour x 2 hours, foul smelling vaginal discharge, pain not controlled by usual oral pain meds,  persistent nausea and vomiting not controlled on medications  2) She desired IUD at 6w postpartum for contraception.  3) For feeding she decided to breastfeed.  4) She was instructed to follow-up with her primary OB in 6 weeks for a routine postpartum visit with GTT.    Yas Maguire MD  Ob/Gyn Resident, PGY-3  Pager: [phone]  09/10/20 7:05 PM     I saw and evaluated this patient prior to discharge. I discussed the patient with the resident and agree with plan of care as documented in the resident note.   I personally reviewed vital signs, medications, labs.   I personally spent 10 minutes on discharge activities.  Angela Duke MD

## 2020-09-07 NOTE — PROGRESS NOTES
[facility]  Labor Progress Note    S:  Comfortable with epidural    O:   /63, HR 76  SVE: 5-6/70/-2, AROM with clear fluids    FHT: Baseline 130, moderate variability, + accelerations, -decels  [place]: 3-4 contractions in 10 minutes    A/P:  Ms. Mark Cervantes is a 35 year old  at 40w0d, here for IOL for AMA. Pregnancy complicated by GDMA1, hx of PPH, hx of pre-E, hep C ab positive, hep C RNA neg, hep B NI. Labor progressing as expected; now s/p AROM.    Labor: - /-3 (1400) > PO Miso x 3 (193)> 1.5//-3, davis 70cc ()>s/p davis, /-3 (2320)> 5-6//-2, AROM (0145). Epidural in place.  FWB: - Category 1 FHT, reactive and reassuring  PNC: - Rh pos, Rubella immune, GBS neg. Hep B NI (order vaccine postpartum)    # GDMA1  - US  showed EFW 4319g, AC >97%tile. Discussed risk of shoulder dystocia and offered . Patient declined ; will avoid vacuum for delivery  - QID blood glucose checks in latent labor  - q1hr glucose checks in active labor  - insulin gtt in active labor dominik Lucero MD  OB/GYN PGY-2  20 12:48 AM

## 2020-09-07 NOTE — PROGRESS NOTES
[facility]  Labor Progress Note    S:  Comfortable with epidural. Requesting a cervical check.    O:   /75, HR 96  SVE: 6/70/-2    FHT: Baseline 130, moderate variability, + accelerations, rare early decels  [place]: 2-3 contractions in 10 minutes    A/P:  Ms. Mark Cervantes is a 35 year old  at 40w0d, here for IOL for AMA. Pregnancy complicated by GDMA1, hx of PPH, hx of pre-E, hep C ab positive, hep C RNA neg, hep B NI. Labor progressing as expected. Contraction pattern irregular with coupling. Will start pitocin at this time.    Labor: - /-3 (1400) > PO Miso x 3 (193)> 1.5/70/-3, davis 70cc ()>s/p davis, /-3 (0)> 5-6/70/-2, AROM (0145)> 6/70/-2, pit (0346). Epidural in place.  FWB: - Category 1 FHT, reactive and reassuring  PNC: - Rh pos, Rubella immune, GBS neg. Hep B NI (order vaccine postpartum)    # GDMA1  - US  showed EFW 4319g, AC >97%tile. Discussed risk of shoulder dystocia and offered . Patient declined ; will avoid vacuum for delivery  - QID blood glucose checks in latent labor  - q1hr glucose checks in active labor  - insulin gtt in active labor dominik Lucero MD  OB/GYN PGY-2  20 3:51 AM

## 2020-09-08 VITALS
DIASTOLIC BLOOD PRESSURE: 67 MMHG | TEMPERATURE: 97.8 F | HEART RATE: 77 BPM | RESPIRATION RATE: 18 BRPM | OXYGEN SATURATION: 100 % | SYSTOLIC BLOOD PRESSURE: 99 MMHG

## 2020-09-08 LAB
GLUCOSE BLDC GLUCOMTR-MCNC: 104 MG/DL (ref 70–99)
HCV AB SERPL QL IA: REACTIVE
HGB BLD-MCNC: 9.6 G/DL (ref 11.7–15.7)
T PALLIDUM AB SER QL: NONREACTIVE

## 2020-09-08 PROCEDURE — 00000146 ZZHCL STATISTIC GLUCOSE BY METER IP

## 2020-09-08 PROCEDURE — 25000132 ZZH RX MED GY IP 250 OP 250 PS 637: Performed by: STUDENT IN AN ORGANIZED HEALTH CARE EDUCATION/TRAINING PROGRAM

## 2020-09-08 PROCEDURE — 36415 COLL VENOUS BLD VENIPUNCTURE: CPT | Performed by: STUDENT IN AN ORGANIZED HEALTH CARE EDUCATION/TRAINING PROGRAM

## 2020-09-08 PROCEDURE — 85018 HEMOGLOBIN: CPT | Performed by: STUDENT IN AN ORGANIZED HEALTH CARE EDUCATION/TRAINING PROGRAM

## 2020-09-08 RX ORDER — FERROUS SULFATE 325(65) MG
325 TABLET, DELAYED RELEASE (ENTERIC COATED) ORAL DAILY
Qty: 90 TABLET | Refills: 3 | Status: SHIPPED | OUTPATIENT
Start: 2020-09-08 | End: 2020-11-16

## 2020-09-08 RX ADMIN — ACETAMINOPHEN 650 MG: 325 TABLET, FILM COATED ORAL at 11:42

## 2020-09-08 RX ADMIN — IBUPROFEN 800 MG: 800 TABLET ORAL at 11:42

## 2020-09-08 RX ADMIN — DOCUSATE SODIUM AND SENNOSIDES 2 TABLET: 8.6; 5 TABLET, FILM COATED ORAL at 07:25

## 2020-09-08 RX ADMIN — ACETAMINOPHEN 650 MG: 325 TABLET, FILM COATED ORAL at 06:04

## 2020-09-08 RX ADMIN — IBUPROFEN 800 MG: 800 TABLET ORAL at 06:04

## 2020-09-08 RX ADMIN — ACETAMINOPHEN 650 MG: 325 TABLET, FILM COATED ORAL at 00:05

## 2020-09-08 RX ADMIN — IBUPROFEN 800 MG: 800 TABLET ORAL at 00:05

## 2020-09-08 NOTE — PLAN OF CARE
VSS at this time.  Post partum assessment WDL. Taking tylenol and ibuprofen for cramping. Up and moving around independently. Good support and help from grandmother. Passed one small clot in the toilet overnight. Breastfeeding on cue- independently. Will continue to monitor at this time.

## 2020-09-08 NOTE — PROGRESS NOTES
Post Partum Progress Note    Subjective:  Patient is doing well, pain well controlled. Tolerating PO, ambulating without any issues, voiding spontaneously, lochia within normal limits. Denies any fever, chills, SOB, chest pain, N/V,  headache, dizziness. Planning on breastfeeding though still waiting for milk let-down. Would like IUD at 6 weeks for birth control.    Objective:  Patient Vitals for the past 24 hrs:   BP Temp Temp src Pulse Resp SpO2   20 0726 99/67 97.8  F (36.6  C) Oral 77 18 --   20 0045 102/64 97.8  F (36.6  C) Oral 76 18 --   20 1705 99/57 -- -- 67 -- 100 %   20 1700 93/60 98.3  F (36.8  C) Oral 71 18 --   20 1550 98/58 98.5  F (36.9  C) Oral 79 18 99 %   20 1200 110/71 98.5  F (36.9  C) Oral 93 18 98 %   20 1100 99/55 -- -- -- 16 --   20 1045 102/57 -- -- -- 16 93 %   20 1030 102/59 -- -- -- 16 96 %   20 1000 98/58 -- -- -- 16 91 %   20 0945 107/60 -- -- -- -- --   20 0930 105/59 99.1  F (37.3  C) Oral -- 16 --   20 0915 100/68 -- -- -- 16 97 %       General: NAD, resting comfortably  Resp:  Normal effort and rate on room air  Cv: well perfused  Abd:  Soft, nontender, mild distension, tympanic with percussion, fundus firm below the umbilicus  Ext:  No edema in bilateral LE      Assessment and Plan:  35 year old old  PPD#1 s/p . Pregnancy complicated by GDMA1, hep B NI, hx of pre-E postpartum and PPH. Doing well, starting to meet discharge goals.    # Postpartum Care  - Pain: Tylenol, ibuprofen.  - Heme: Hgb 10.9>  > 9.6; will discharge with oral iron since hgb <10  - GI: Regular diet. Bowel regimen. Antiemetics PRN.  - Rh positive, Rubella immune. Hep B vaccine ordered  - breastfeeding  - IUD at 6 weeks for birth control    # GDMA1  - AM   - GTT at 6 weeks    Anticipate discharge to home PPD#2    Myron Lucero MD  OB/GYN PGY-2  20 7:43 AM    Acute blood loss anemia complicating chronic  anemia, will discharge on iron.  This patient was seen separately from the resident.  Patient desires discharge to home.  She is meeting goals.  I agree with exam and note above.  Angela Duke MD

## 2020-09-08 NOTE — PLAN OF CARE
Data: Vital signs within normal limits. Postpartum checks within normal limits - see flow record. Patient did pass a 12 gram clot earlier this evening, fundus has remained firm at U/U with no further clots and scant bleeding. Instructed patient to notify nurse if she starts bleeding more or she passes any more clots. Patient eating and drinking normally. Patient able to empty bladder independently and is up ambulating. No apparent signs of infection. Patient performing self cares and is able to care for infant. Breastfeeding and formula feeding baby on demand.  Action: Pain has been adequately managed with oral medications.   Response: Positive attachment behaviors observed with infant. Support person, mother, present.   Plan: Continue with the plan of care.

## 2020-09-08 NOTE — CONSULTS
Diabetes CNS Consult  Received consult request to see this 35 year old female. Patient diagnosed with gestational diabetes during this pregnancy.  Patient's postpartum fasting glucose today was 104 mg/dL.    Patient discharging home today.  Per discharge summary, patient to have a 2 hour glucose tolerance test at her 6 week postpartum visit.    No new learning needs identified.  Janice Altamirano MS, APRN, CNS, CDE, CDTC  [phone]

## 2020-09-08 NOTE — PLAN OF CARE
Discharged to home with baby. Instructions & prescriptions given & reviewed. ID bands double checked. Pt had no further questions & verbalized understanding her plan. Instructed to follow up at clinic within 6 weeks for PP check.

## 2020-11-16 ENCOUNTER — OFFICE VISIT (OUTPATIENT)
Dept: OBGYN | Facility: CLINIC | Age: 35
End: 2020-11-16
Attending: MIDWIFE
Payer: COMMERCIAL

## 2020-11-16 VITALS
SYSTOLIC BLOOD PRESSURE: 114 MMHG | BODY MASS INDEX: 31.39 KG/M2 | HEART RATE: 73 BPM | WEIGHT: 195.3 LBS | HEIGHT: 66 IN | DIASTOLIC BLOOD PRESSURE: 74 MMHG

## 2020-11-16 DIAGNOSIS — Z30.430 ENCOUNTER FOR INSERTION OF INTRAUTERINE CONTRACEPTIVE DEVICE: ICD-10-CM

## 2020-11-16 DIAGNOSIS — Z12.4 SCREENING FOR CERVICAL CANCER: ICD-10-CM

## 2020-11-16 DIAGNOSIS — Z86.32 HISTORY OF GESTATIONAL DIABETES: Primary | ICD-10-CM

## 2020-11-16 LAB
HCG UR QL: NEGATIVE
INTERNAL QC OK POCT: YES

## 2020-11-16 PROCEDURE — 58300 INSERT INTRAUTERINE DEVICE: CPT | Performed by: MIDWIFE

## 2020-11-16 PROCEDURE — G0463 HOSPITAL OUTPT CLINIC VISIT: HCPCS | Mod: 25

## 2020-11-16 PROCEDURE — G0145 SCR C/V CYTO,THINLAYER,RESCR: HCPCS | Performed by: MIDWIFE

## 2020-11-16 PROCEDURE — 250N000009 HC RX 250: Performed by: MIDWIFE

## 2020-11-16 PROCEDURE — 87624 HPV HI-RISK TYP POOLED RSLT: CPT | Performed by: MIDWIFE

## 2020-11-16 PROCEDURE — 81025 URINE PREGNANCY TEST: CPT | Performed by: MIDWIFE

## 2020-11-16 PROCEDURE — 99024 POSTOP FOLLOW-UP VISIT: CPT | Performed by: MIDWIFE

## 2020-11-16 RX ORDER — COPPER 313.4 MG/1
1 INTRAUTERINE DEVICE INTRAUTERINE ONCE
Status: COMPLETED
Start: 2020-11-16 | End: 2020-11-16

## 2020-11-16 RX ORDER — COPPER 313.4 MG/1
1 INTRAUTERINE DEVICE INTRAUTERINE ONCE
Status: ON HOLD | COMMUNITY
End: 2022-12-04

## 2020-11-16 RX ADMIN — COPPER 1 EACH: 313.4 INTRAUTERINE DEVICE INTRAUTERINE at 14:03

## 2020-11-16 ASSESSMENT — ANXIETY QUESTIONNAIRES
5. BEING SO RESTLESS THAT IT IS HARD TO SIT STILL: NOT AT ALL
1. FEELING NERVOUS, ANXIOUS, OR ON EDGE: NOT AT ALL
2. NOT BEING ABLE TO STOP OR CONTROL WORRYING: NOT AT ALL
3. WORRYING TOO MUCH ABOUT DIFFERENT THINGS: NOT AT ALL
7. FEELING AFRAID AS IF SOMETHING AWFUL MIGHT HAPPEN: NOT AT ALL
IF YOU CHECKED OFF ANY PROBLEMS ON THIS QUESTIONNAIRE, HOW DIFFICULT HAVE THESE PROBLEMS MADE IT FOR YOU TO DO YOUR WORK, TAKE CARE OF THINGS AT HOME, OR GET ALONG WITH OTHER PEOPLE: NOT DIFFICULT AT ALL
6. BECOMING EASILY ANNOYED OR IRRITABLE: NOT AT ALL
GAD7 TOTAL SCORE: 0

## 2020-11-16 ASSESSMENT — PAIN SCALES - GENERAL: PAINLEVEL: NO PAIN (0)

## 2020-11-16 ASSESSMENT — MIFFLIN-ST. JEOR: SCORE: 1600.49

## 2020-11-16 ASSESSMENT — PATIENT HEALTH QUESTIONNAIRE - PHQ9
SUM OF ALL RESPONSES TO PHQ QUESTIONS 1-9: 2
5. POOR APPETITE OR OVEREATING: NOT AT ALL

## 2020-11-16 NOTE — NURSING NOTE
Clinic Administered Medication Documentation      Intrauterine/Implant Insertion Documentation    Device was placed by provider (please see MAR for given by information). Please see MAR and medication order for additional information.     Type: Paragard  Remove/Replace by: Linda Bellamy CNM    Expiration Date:  7-2026

## 2020-11-16 NOTE — LETTER
2020       RE: Mark Cervantes  [address]  [place] [address]     Dear Colleague,    Thank you for referring your patient, Mark Cervantes, to the [facility] [place] [facility]. Please see a copy of my visit note below.      Nursing Notes:   Emily Whitney LPN  2020  1:04 PM  Signed  SUBJECTIVE:   Mark Cervantes is here for her 6-week postpartum checkup.     PHQ-9 score:   Hx of Abuse:  No    Delivery Date: 20.    Delivering provider:  Shira Richards MD.    Type of delivery:  .  Perineum:  tear, with repair.     Delivery complications: None  Infant gender:  Girl Meredith, weight 9 pounds 8 oz.  Feeding Method:   and Bottlefed.  Complications reported with feeding:  none, infant thriving .    Bleeding:  Light.  Duration:  6 weeks.  Menses resumed:  No  Bowel/Urinary problems:  No      Contraception Planned:  IUD Mirena talk about  She  has had intercourse since delivery and experienced  mild discomfort.    Better now,.      Emily Whitney LPN  2020  2:04 PM  Signed  Clinic Administered Medication Documentation      Intrauterine/Implant Insertion Documentation    Device was placed by provider (please see MAR for given by information). Please see MAR and medication order for additional information.     Type: Paragard  Remove/Replace by: Linda Bellamy ELEAZAR    Expiration Date:           Baby girl Meredith born 20 ~10 weeks prior  Cramping and bleeding now resolved. Denies symptoms of anemia. Declines POCT hemoglobin screening today.   Breastfeeding well, does supplement with formula if she Still seems hungry. No breast pain or nipple cracking.   Interested in Paragard. Had this after 2nd baby. Heavy periods for several months, then improved. Had removed after 2 years because she thought it was making weight loss more difficult. Sexual intercourse with condoms 100% several times since birth. Denies any  "condoms breaking or falling off.     Mark is wondering if she can have Hep C antibody or RNA test repeated. Pt states she was surprised by the Hep C positive antibody test this pregnancy since she has never tested positive in past. Reviewed that this may not have been done in previous pregnancies as it is not routine. Pt wondering if her  should be tested for Hep C. Discussed that this would be recommended.    ================================================================  ROS: 10 point ROS neg other than the symptoms noted above in the HPI.     EXAM:  /74   Pulse 73   Ht 1.689 m (5' 6.5\")   Wt 88.6 kg (195 lb 4.8 oz)   LMP 12/01/2019   BMI 31.05 kg/m      General: healthy, alert and no distress  Psych: negative for anxiety, depression, thoughts of self-harm and thoughts of hurting someone else  Last PHQ-9 score on record= No Value exists for the : HP#PHQ9  Breasts:  Lactating, Nipples intact with no lesions and No S/S of yeast or mastitis  Abdomen: Benign, Soft, flat, non-tender, No masses, organomegaly and Diastasis 2 FB  Incision:  None   Vulva:  Normal genitalia and Bartholin's, Urethra, [place]'s normal  Vagina:  normal with good muscle tone  Cervix:  Multiparous, and pink, moist, closed, without lesion or CMT.    Uterus:  fully involuted and non-tender. Midposition, slightly anteverted    Adnexa:  Within normal limits and No masses, nodularity, tenderness  Recto-vaginal:   anus normal    IUD Insertion Note:      Time Out - \"Pause for the Cause\"  Just before the procedure begins, through verbal and active participation of team members, verify:    Initials   Patient Name    MMC   Patient Date of Birth MMC   Procedure to be performed  MMC     Consent:  Verbal consent obtained from patient. , Risks, benefits of treatment, and no treatment were discussed.  Patient's questions were elicited and answered. , Written consent signed and scanned into medical record. and Patient received and " verbalized understanding of discharge instructions    After informed consent was obtained from the patient, a speculum was placed in the vagina to visualized the cervix.  The cervix was then swabbed with a betadine prep x 3.   Tenaculum was placed at the 12 o'clock position on the cervix and the uterus sounded to 9cm.  The Paragard T-380  IUD was then placed in the usual fashion under sterile technique.  Strings were clipped about 2 cm from the cervical os.  Tenaculum was removed and cervix was hemostatic.  There were no complications.  The patient tolerated the procedure well.    ASSESSMENT:   Encounter Diagnoses   Name Primary?     History of gestational diabetes Yes     Routine postpartum follow-up      Postpartum care and examination of lactating mother      Encounter for insertion of intrauterine contraceptive device      Screening for cervical cancer       Normal postpartum exam after   Pregnancy was complicated by:  Gestational Diabetes - diet controlled.      PLAN:  Orders Placed This Encounter   Procedures     INSERTION INTRAUTERINE DEVICE     Obtaining, preparing and conveyance of cervical or vaginal smear to laboratory.     Glucose 2 Hour     Pap imaged thin layer screen with HPV - recommended age 30 - 65 years (select HPV order below)     HPV High Risk Types DNA Cervical     hCG qual urine POCT      Orders Placed This Encounter   Medications     paragard intrauterine copper device     Si each by Intrauterine route once     paragard intrauterine copper IUD device 1 each     Risks and benefits of prescribed medications discussed.  Medication instructions reviewed.  Contraception methods discussed- Paragard inserted  Discussed healthy diet, exercise, and some weight loss recommendations/tipsDi  Signs and symptoms of postpartum depression/anxiety discussed and resources offered  Discussed calcium intake, vitamins and supplements including Vitamin D  Exercise encouraged  Kegel exercises  recommended  IUD follow-up in 4-6 weeks    HERBERT Sullivan, CNM

## 2020-11-16 NOTE — NURSING NOTE
SUBJECTIVE:   Mark Cervantes is here for her 6-week postpartum checkup.     PHQ-9 score:   Hx of Abuse:  No    Delivery Date: 20.    Delivering provider:  Shira Richards MD.    Type of delivery:  .  Perineum:  tear, with repair.     Delivery complications: None  Infant gender:  Girl Meredith, weight 9 pounds 8 oz.  Feeding Method:   and Bottlefed.  Complications reported with feeding:  none, infant thriving .    Bleeding:  Light.  Duration:  6 weeks.  Menses resumed:  No  Bowel/Urinary problems:  No      Contraception Planned:  IUD Mirena talk about  She  has had intercourse since delivery and experienced  mild discomfort.    Better now,.

## 2020-11-16 NOTE — PROGRESS NOTES
Nursing Notes:   Emily Whitney LPN  2020  1:04 PM  Signed  SUBJECTIVE:   Mark Cervantes is here for her 6-week postpartum checkup.     PHQ-9 score:   Hx of Abuse:  No    Delivery Date: 20.    Delivering provider:  Shira Richards MD.    Type of delivery:  .  Perineum:  tear, with repair.     Delivery complications: None  Infant gender:  Girl Meredith, weight 9 pounds 8 oz.  Feeding Method:   and Bottlefed.  Complications reported with feeding:  none, infant thriving .    Bleeding:  Light.  Duration:  6 weeks.  Menses resumed:  No  Bowel/Urinary problems:  No      Contraception Planned:  IUD Mirena talk about  She  has had intercourse since delivery and experienced  mild discomfort.    Better now,.      Emily Whitney LPN  2020  2:04 PM  Signed  Clinic Administered Medication Documentation      Intrauterine/Implant Insertion Documentation    Device was placed by provider (please see MAR for given by information). Please see MAR and medication order for additional information.     Type: Paragard  Remove/Replace by: Linda Bellamy [facility]    Expiration Date:           Baby girl Meredith born 20 ~10 weeks prior  Cramping and bleeding now resolved. Denies symptoms of anemia. Declines POCT hemoglobin screening today.   Breastfeeding well, does supplement with formula if she Still seems hungry. No breast pain or nipple cracking.   Interested in Paragard. Had this after 2nd baby. Heavy periods for several months, then improved. Had removed after 2 years because she thought it was making weight loss more difficult. Sexual intercourse with condoms 100% several times since birth. Denies any condoms breaking or falling off.     Mark is wondering if she can have Hep C antibody or RNA test repeated. Pt states she was surprised by the Hep C positive antibody test this pregnancy since she has never tested positive in past. Reviewed that this may not have been done in previous pregnancies as it is not  "routine. Pt wondering if her  should be tested for Hep C. Discussed that this would be recommended.    ================================================================  ROS: 10 point ROS neg other than the symptoms noted above in the HPI.     EXAM:  /74   Pulse 73   Ht 1.689 m (5' 6.5\")   Wt 88.6 kg (195 lb 4.8 oz)   LMP 12/01/2019   BMI 31.05 kg/m      General: healthy, alert and no distress  Psych: negative for anxiety, depression, thoughts of self-harm and thoughts of hurting someone else  Last PHQ-9 score on record= No Value exists for the : HP#PHQ9  Breasts:  Lactating, Nipples intact with no lesions and No S/S of yeast or mastitis  Abdomen: Benign, Soft, flat, non-tender, No masses, organomegaly and Diastasis 2 FB  Incision:  None   Vulva:  Normal genitalia and Bartholin's, Urethra, [place]'s normal  Vagina:  normal with good muscle tone  Cervix:  Multiparous, and pink, moist, closed, without lesion or CMT.    Uterus:  fully involuted and non-tender. Midposition, slightly anteverted    Adnexa:  Within normal limits and No masses, nodularity, tenderness  Recto-vaginal:   anus normal    IUD Insertion Note:      Time Out - \"Pause for the Cause\"  Just before the procedure begins, through verbal and active participation of team members, verify:    Initials   Patient Name    MMC   Patient Date of Birth MMC   Procedure to be performed  MMC     Consent:  Verbal consent obtained from patient. , Risks, benefits of treatment, and no treatment were discussed.  Patient's questions were elicited and answered. , Written consent signed and scanned into medical record. and Patient received and verbalized understanding of discharge instructions    After informed consent was obtained from the patient, a speculum was placed in the vagina to visualized the cervix.  The cervix was then swabbed with a betadine prep x 3.   Tenaculum was placed at the 12 o'clock position on the cervix and the uterus sounded to 9cm.  " The Paragard T-380  IUD was then placed in the usual fashion under sterile technique.  Strings were clipped about 2 cm from the cervical os.  Tenaculum was removed and cervix was hemostatic.  There were no complications.  The patient tolerated the procedure well.    ASSESSMENT:   Encounter Diagnoses   Name Primary?     History of gestational diabetes Yes     Routine postpartum follow-up      Postpartum care and examination of lactating mother      Encounter for insertion of intrauterine contraceptive device      Screening for cervical cancer       Normal postpartum exam after   Pregnancy was complicated by:  Gestational Diabetes - diet controlled.      PLAN:  Orders Placed This Encounter   Procedures     INSERTION INTRAUTERINE DEVICE     Obtaining, preparing and conveyance of cervical or vaginal smear to laboratory.     Glucose 2 Hour     Pap imaged thin layer screen with HPV - recommended age 30 - 65 years (select HPV order below)     HPV High Risk Types DNA Cervical     hCG qual urine POCT      Orders Placed This Encounter   Medications     paragard intrauterine copper device     Si each by Intrauterine route once     paragard intrauterine copper IUD device 1 each       Risks and benefits of prescribed medications discussed.  Medication instructions reviewed.  Contraception methods discussed- Paragard inserted  Discussed healthy diet, exercise, and some weight loss recommendations/tipsDi  Signs and symptoms of postpartum depression/anxiety discussed and resources offered  Discussed calcium intake, vitamins and supplements including Vitamin D  Exercise encouraged  Kegel exercises recommended  IUD follow-up in 4-6 weeks    HERBERT Sullivan, KIKI

## 2020-11-17 ENCOUNTER — TELEPHONE (OUTPATIENT)
Dept: OBGYN | Facility: CLINIC | Age: 35
End: 2020-11-17

## 2020-11-17 PROBLEM — Z34.90 ENCOUNTER FOR INDUCTION OF LABOR: Status: RESOLVED | Noted: 2020-09-06 | Resolved: 2020-11-17

## 2020-11-17 PROBLEM — O09.90 HIGH RISK PREGNANCY, ANTEPARTUM: Status: RESOLVED | Noted: 2020-03-31 | Resolved: 2020-11-17

## 2020-11-17 PROBLEM — O24.419 GESTATIONAL DIABETES MELLITUS: Status: RESOLVED | Noted: 2020-07-30 | Resolved: 2020-11-17

## 2020-11-17 ASSESSMENT — ANXIETY QUESTIONNAIRES: GAD7 TOTAL SCORE: 0

## 2020-11-17 NOTE — TELEPHONE ENCOUNTER
Call to patient to notify her that no further Hep C test is needed at this time. No answer. If patient calls back, inform her of the following:    - Hep C antibody test was already completed twice during the last pregnancy so it is not recommended to repeat, very unlikely false positive. Hep C RNA negative twice as well, no chronic infection so follow-up is not indicated.   - No previous Hep C antibody test results found so she was not likely testing in previous pregnancies.   - Pt should still go to lab for GTT (2 hour, fasting)  - Remind patient to schedule follow-up in 4-6 weeks (routine IUD check-up post insertion)  - Hep B vaccine due at next appt     HERBERT Sullivan, KIKI

## 2020-11-19 LAB
COPATH REPORT: NORMAL
PAP: NORMAL

## 2020-11-20 LAB
FINAL DIAGNOSIS: NORMAL
HPV HR 12 DNA CVX QL NAA+PROBE: NEGATIVE
HPV16 DNA SPEC QL NAA+PROBE: NEGATIVE
HPV18 DNA SPEC QL NAA+PROBE: NEGATIVE
SPECIMEN DESCRIPTION: NORMAL
SPECIMEN SOURCE CVX/VAG CYTO: NORMAL

## 2020-12-14 ENCOUNTER — HEALTH MAINTENANCE LETTER (OUTPATIENT)
Age: 35
End: 2020-12-14

## 2021-10-02 ENCOUNTER — HEALTH MAINTENANCE LETTER (OUTPATIENT)
Age: 36
End: 2021-10-02

## 2022-01-22 ENCOUNTER — HEALTH MAINTENANCE LETTER (OUTPATIENT)
Age: 37
End: 2022-01-22

## 2022-03-14 ENCOUNTER — OFFICE VISIT (OUTPATIENT)
Dept: OBGYN | Facility: CLINIC | Age: 37
End: 2022-03-14
Payer: COMMERCIAL

## 2022-03-14 VITALS
SYSTOLIC BLOOD PRESSURE: 116 MMHG | BODY MASS INDEX: 27.06 KG/M2 | HEART RATE: 65 BPM | DIASTOLIC BLOOD PRESSURE: 74 MMHG | WEIGHT: 170.2 LBS

## 2022-03-14 DIAGNOSIS — Z86.32 HISTORY OF GESTATIONAL DIABETES MELLITUS (GDM): ICD-10-CM

## 2022-03-14 DIAGNOSIS — D50.0 IRON DEFICIENCY ANEMIA DUE TO CHRONIC BLOOD LOSS: Primary | ICD-10-CM

## 2022-03-14 DIAGNOSIS — Z13.21 ENCOUNTER FOR SCREENING FOR NUTRITIONAL DISORDER: ICD-10-CM

## 2022-03-14 DIAGNOSIS — Z30.432 ENCOUNTER FOR IUD REMOVAL: ICD-10-CM

## 2022-03-14 DIAGNOSIS — R63.4 WEIGHT LOSS: ICD-10-CM

## 2022-03-14 PROBLEM — B18.2 HEPATITIS C, CHRONIC (H): Status: ACTIVE | Noted: 2022-03-14

## 2022-03-14 LAB
ERYTHROCYTE [DISTWIDTH] IN BLOOD BY AUTOMATED COUNT: 17.2 % (ref 10–15)
FERRITIN SERPL-MCNC: 5 NG/ML (ref 12–150)
FOLATE SERPL-MCNC: 19.6 NG/ML
HBA1C MFR BLD: 5.4 % (ref 0–5.6)
HCT VFR BLD AUTO: 31.4 % (ref 35–47)
HGB BLD-MCNC: 9.7 G/DL (ref 11.7–15.7)
IRON SATN MFR SERPL: 6 % (ref 15–46)
IRON SERPL-MCNC: 23 UG/DL (ref 35–180)
MCH RBC QN AUTO: 23.3 PG (ref 26.5–33)
MCHC RBC AUTO-ENTMCNC: 30.9 G/DL (ref 31.5–36.5)
MCV RBC AUTO: 76 FL (ref 78–100)
PLATELET # BLD AUTO: 425 10E3/UL (ref 150–450)
RBC # BLD AUTO: 4.16 10E6/UL (ref 3.8–5.2)
T3FREE SERPL-MCNC: 2.3 PG/ML (ref 2.3–4.2)
T4 FREE SERPL-MCNC: 0.97 NG/DL (ref 0.76–1.46)
TIBC SERPL-MCNC: 385 UG/DL (ref 240–430)
TSH SERPL DL<=0.005 MIU/L-ACNC: 0.87 MU/L (ref 0.4–4)
VIT B12 SERPL-MCNC: 677 PG/ML (ref 193–986)
WBC # BLD AUTO: 7.9 10E3/UL (ref 4–11)

## 2022-03-14 PROCEDURE — 99000 SPECIMEN HANDLING OFFICE-LAB: CPT | Performed by: OBSTETRICS & GYNECOLOGY

## 2022-03-14 PROCEDURE — 85027 COMPLETE CBC AUTOMATED: CPT | Performed by: OBSTETRICS & GYNECOLOGY

## 2022-03-14 PROCEDURE — 84439 ASSAY OF FREE THYROXINE: CPT | Performed by: OBSTETRICS & GYNECOLOGY

## 2022-03-14 PROCEDURE — 83036 HEMOGLOBIN GLYCOSYLATED A1C: CPT | Performed by: OBSTETRICS & GYNECOLOGY

## 2022-03-14 PROCEDURE — 84443 ASSAY THYROID STIM HORMONE: CPT | Performed by: OBSTETRICS & GYNECOLOGY

## 2022-03-14 PROCEDURE — 82746 ASSAY OF FOLIC ACID SERUM: CPT | Performed by: OBSTETRICS & GYNECOLOGY

## 2022-03-14 PROCEDURE — 82728 ASSAY OF FERRITIN: CPT | Performed by: OBSTETRICS & GYNECOLOGY

## 2022-03-14 PROCEDURE — 58301 REMOVE INTRAUTERINE DEVICE: CPT | Performed by: OBSTETRICS & GYNECOLOGY

## 2022-03-14 PROCEDURE — 36415 COLL VENOUS BLD VENIPUNCTURE: CPT | Performed by: OBSTETRICS & GYNECOLOGY

## 2022-03-14 PROCEDURE — 82306 VITAMIN D 25 HYDROXY: CPT | Performed by: OBSTETRICS & GYNECOLOGY

## 2022-03-14 PROCEDURE — 84630 ASSAY OF ZINC: CPT | Mod: 90 | Performed by: OBSTETRICS & GYNECOLOGY

## 2022-03-14 PROCEDURE — 99203 OFFICE O/P NEW LOW 30 MIN: CPT | Mod: 25 | Performed by: OBSTETRICS & GYNECOLOGY

## 2022-03-14 PROCEDURE — 82607 VITAMIN B-12: CPT | Performed by: OBSTETRICS & GYNECOLOGY

## 2022-03-14 PROCEDURE — 82525 ASSAY OF COPPER: CPT | Mod: 90 | Performed by: OBSTETRICS & GYNECOLOGY

## 2022-03-14 PROCEDURE — 84481 FREE ASSAY (FT-3): CPT | Performed by: OBSTETRICS & GYNECOLOGY

## 2022-03-14 PROCEDURE — 83550 IRON BINDING TEST: CPT | Performed by: OBSTETRICS & GYNECOLOGY

## 2022-03-15 LAB — DEPRECATED CALCIDIOL+CALCIFEROL SERPL-MC: 42 UG/L (ref 20–75)

## 2022-03-17 LAB
COPPER SERPL-MCNC: 170.9 UG/DL
ZINC SERPL-MCNC: 87.2 UG/DL

## 2022-04-04 NOTE — PROGRESS NOTES
HPI:  Mark Cervantes is a 36 year old female  here for a consultation regarding:   Wants her ParaGard IUD removed.  She would like to get pregnant.  Additional concerns today include:  -Wants to know if she is healthy to get pregnant  -Does not understand why her abdomen is not going down after her last baby.  She gave birth last 2020.  She wants to go to physical therapy after she has her next baby.  -She is chronically anemic with her hemoglobin running around 9-10 range.  She does not take iron but does eat a lot of greens.  She reports that when she was 17 years old her hemoglobin was down to 8.  -She has a history of gestational diabetes  -She has been losing weight not intentionally    Patient's last menstrual period was 2022 (exact date).         Wt Readings from Last 4 Encounters:   22 77.2 kg (170 lb 3.2 oz)   20 88.6 kg (195 lb 4.8 oz)   20 96.9 kg (213 lb 11.2 oz)   20 97 kg (213 lb 12.8 oz)      Hemoglobin   Date Value Ref Range Status   2022 9.7 (L) 11.7 - 15.7 g/dL Final   2020 9.6 (L) 11.7 - 15.7 g/dL Final   2020 10.9 (L) 11.7 - 15.7 g/dL Final        Past GYN history:   Lab Results   Component Value Date    PAP NIL 2020       OB History    Para Term  AB Living   5 5 5 0 0 5   SAB IAB Ectopic Multiple Live Births   0 0 0 0 5      # Outcome Date GA Lbr Bryce/2nd Weight Sex Delivery Anes PTL Lv   5 Term 20 40w1d 07:55 / 00:13 4.252 kg (9 lb 6 oz) F Vag-Spont EPI  JIM      Name: MILI,FEMALE-MARK      Apgar1: 9  Apgar5: 9   4 Term 18 41w1d 02:00 / 00:19 3.78 kg (8 lb 5.3 oz) M Vag-Spont EPI N JIM      Name: MILI,BABY1 MARK      Apgar1: 9  Apgar5: 9   3 Term 09/25/15 39w3d 03:30 / 00:11 3.74 kg (8 lb 3.9 oz) F Vag-Spont EPI  JIM      Apgar1: 6  Apgar5: 9   2 Term 10/23/13 39w4d 05:40 / 00:38 3.465 kg (7 lb 10.2 oz) F Vag-Spont EPI  JIM      Name: FABIANLASHONROSENDO      Apgar1: 9  Apgar5: 9   1 Term  05/26/12 39w6d 15:00 / 04:33 3.459 kg (7 lb 10 oz) F Vag-Vacuum EPI, Local N JIM      Name: ROSENDO GARCES      Apgar1: 8  Apgar5: 9      Obstetric Comments   Hx GDM with all pregnancies.  HTN after delivery in 2018-required medication.  No PPH.         Past Medical History:   Diagnosis Date     Anemia      Diabetes mellitus (H)     Gestational diabetes diet controlled     Tuberculosis     skin test + but chest xray ok       Past Surgical History:   Procedure Laterality Date     GYN SURGERY      ovarian cyst       Family History   Problem Relation Age of Onset     Eye Surgery No family hx of          Medications:    Current Outpatient Medications:      paragard intrauterine copper device, 1 each by Intrauterine route once (Patient not taking: Reported on 3/14/2022), Disp: , Rfl:      Prenat-Fe Carbonyl-FA-Omega 3 (ONE-A-DAY WOMENS PRENATAL 1 PO), Take by mouth daily (Patient not taking: Reported on 3/14/2022), Disp: , Rfl:     Allergies:  Patient has no known allergies.    ROS:  She denies abnormal vaginal bleeding, discharge or unusual pelvic pain, no dysuria, frequency or hematuria.    EXAM:  /74 (BP Location: Right arm, Patient Position: Sitting, Cuff Size: Adult Regular)   Pulse 65   Wt 77.2 kg (170 lb 3.2 oz)   LMP 03/12/2022 (Exact Date)   BMI 27.06 kg/m      General - pleasant female in no acute distress.  Neurological - Alert and oriented  Psych:  normal mood and affect.  normal respiratory and cardiovascular effort   Breast - deferred.  Abdomen - soft, nontender, nondistended.  Musculoskeletal - no gross deformities.  Skin- no rashes seen  Pelvic:  EG - normal adult female.  BUS - within normal limits.  Vagina - well rugated, no discharge.  Cervix - no lesions, no CMT.     Procedure:  After verbal consent was obtained from the patient, IUD strings were grasped with ring forcep and IUD easily removed intact with minimal patient discomfort noted.  No bleeding noted.      ASSESSMENT:  Encounter  Diagnoses   Name Primary?     Iron deficiency anemia due to chronic blood loss Yes     History of gestational diabetes mellitus (GDM)      Weight loss      Encounter for screening for nutritional disorder      Encounter for IUD removal         PLAN:   IUD was removed without difficulty   discussed her above concerns.  Will check labs today.   She was counseled on healthy lifestyle in preparation for upcoming pregnancy.     Orders Placed This Encounter   Procedures     REMOVE INTRAUTERINE DEVICE     Vitamin B12     Vitamin D Deficiency     TSH     T4, free     T3, Free     Folate     Copper level     Zinc     CBC with platelets     Ferritin     Iron and iron binding capacity     Hemoglobin A1c       Ayde Cunningham MD

## 2022-07-28 ENCOUNTER — TRANSCRIBE ORDERS (OUTPATIENT)
Dept: MATERNAL FETAL MEDICINE | Facility: CLINIC | Age: 37
End: 2022-07-28

## 2022-07-28 ENCOUNTER — OFFICE VISIT (OUTPATIENT)
Dept: OBGYN | Facility: CLINIC | Age: 37
End: 2022-07-28
Payer: COMMERCIAL

## 2022-07-28 VITALS
HEIGHT: 67 IN | BODY MASS INDEX: 29.98 KG/M2 | HEART RATE: 84 BPM | DIASTOLIC BLOOD PRESSURE: 65 MMHG | SYSTOLIC BLOOD PRESSURE: 91 MMHG | WEIGHT: 191 LBS | OXYGEN SATURATION: 100 %

## 2022-07-28 DIAGNOSIS — O09.529 AMA (ADVANCED MATERNAL AGE) MULTIGRAVIDA 35+, UNSPECIFIED TRIMESTER: Primary | ICD-10-CM

## 2022-07-28 DIAGNOSIS — O26.90 PREGNANCY RELATED CONDITION, ANTEPARTUM: Primary | ICD-10-CM

## 2022-07-28 DIAGNOSIS — R79.0 ABNORMAL BLOOD LEVEL OF COPPER: ICD-10-CM

## 2022-07-28 DIAGNOSIS — Z86.32 HISTORY OF GESTATIONAL DIABETES: ICD-10-CM

## 2022-07-28 DIAGNOSIS — B18.2 CHRONIC HEPATITIS C WITHOUT HEPATIC COMA (H): ICD-10-CM

## 2022-07-28 DIAGNOSIS — D50.9 IRON DEFICIENCY ANEMIA, UNSPECIFIED IRON DEFICIENCY ANEMIA TYPE: ICD-10-CM

## 2022-07-28 LAB
ABO/RH(D): NORMAL
ANTIBODY SCREEN: NEGATIVE
ERYTHROCYTE [DISTWIDTH] IN BLOOD BY AUTOMATED COUNT: 17.3 % (ref 10–15)
GLUCOSE 1H P 50 G GLC PO SERPL-MCNC: 112 MG/DL (ref 70–129)
HCT VFR BLD AUTO: 30 % (ref 35–47)
HGB BLD-MCNC: 9.7 G/DL (ref 11.7–15.7)
MCH RBC QN AUTO: 24.1 PG (ref 26.5–33)
MCHC RBC AUTO-ENTMCNC: 32.3 G/DL (ref 31.5–36.5)
MCV RBC AUTO: 74 FL (ref 78–100)
PLATELET # BLD AUTO: 305 10E3/UL (ref 150–450)
RBC # BLD AUTO: 4.03 10E6/UL (ref 3.8–5.2)
SPECIMEN EXPIRATION DATE: NORMAL
WBC # BLD AUTO: 8.3 10E3/UL (ref 4–11)

## 2022-07-28 PROCEDURE — 82728 ASSAY OF FERRITIN: CPT | Performed by: OBSTETRICS & GYNECOLOGY

## 2022-07-28 PROCEDURE — 85027 COMPLETE CBC AUTOMATED: CPT | Performed by: OBSTETRICS & GYNECOLOGY

## 2022-07-28 PROCEDURE — 87522 HEPATITIS C REVRS TRNSCRPJ: CPT | Performed by: OBSTETRICS & GYNECOLOGY

## 2022-07-28 PROCEDURE — 86900 BLOOD TYPING SEROLOGIC ABO: CPT | Performed by: OBSTETRICS & GYNECOLOGY

## 2022-07-28 PROCEDURE — 86762 RUBELLA ANTIBODY: CPT | Performed by: OBSTETRICS & GYNECOLOGY

## 2022-07-28 PROCEDURE — 86780 TREPONEMA PALLIDUM: CPT | Performed by: OBSTETRICS & GYNECOLOGY

## 2022-07-28 PROCEDURE — 82525 ASSAY OF COPPER: CPT | Mod: 90 | Performed by: OBSTETRICS & GYNECOLOGY

## 2022-07-28 PROCEDURE — 86901 BLOOD TYPING SEROLOGIC RH(D): CPT | Performed by: OBSTETRICS & GYNECOLOGY

## 2022-07-28 PROCEDURE — 87086 URINE CULTURE/COLONY COUNT: CPT | Performed by: OBSTETRICS & GYNECOLOGY

## 2022-07-28 PROCEDURE — 82950 GLUCOSE TEST: CPT | Performed by: OBSTETRICS & GYNECOLOGY

## 2022-07-28 PROCEDURE — 83550 IRON BINDING TEST: CPT | Performed by: OBSTETRICS & GYNECOLOGY

## 2022-07-28 PROCEDURE — 86850 RBC ANTIBODY SCREEN: CPT | Performed by: OBSTETRICS & GYNECOLOGY

## 2022-07-28 PROCEDURE — 87340 HEPATITIS B SURFACE AG IA: CPT | Performed by: OBSTETRICS & GYNECOLOGY

## 2022-07-28 PROCEDURE — 82306 VITAMIN D 25 HYDROXY: CPT | Performed by: OBSTETRICS & GYNECOLOGY

## 2022-07-28 PROCEDURE — 36415 COLL VENOUS BLD VENIPUNCTURE: CPT | Performed by: OBSTETRICS & GYNECOLOGY

## 2022-07-28 PROCEDURE — 99000 SPECIMEN HANDLING OFFICE-LAB: CPT | Performed by: OBSTETRICS & GYNECOLOGY

## 2022-07-28 PROCEDURE — 99203 OFFICE O/P NEW LOW 30 MIN: CPT | Performed by: OBSTETRICS & GYNECOLOGY

## 2022-07-28 PROCEDURE — 87389 HIV-1 AG W/HIV-1&-2 AB AG IA: CPT | Performed by: OBSTETRICS & GYNECOLOGY

## 2022-07-28 RX ORDER — FERROUS GLUCONATE 324(38)MG
324 TABLET ORAL
COMMUNITY
End: 2023-08-08

## 2022-07-28 NOTE — PROGRESS NOTES
HPI:  Mark Cervantes is a 37 year old female  here for a consultation regarding: new pregnancy.   Patient Had her copper IUD out in 2022 due to not feeling well.    Copper level was elevated.  Felt better after the IUD came out.   She has chronic anemia.  Feels like even though she is taking iron, her hgb does not come up.     Never had another period after the IUD came out.   Did a quant hcg at work and was > 10,000 last week.   Thinks she is 20 wks pregnant based on LMP of ~ 3/10/22.    Last month she started feeling a little SOB with walking around.   May feel slight fetal movement.   No abnormal bleeding.   Happy to be pregnant.    X 5 with GDM every pregnancy.   Taking iron but feels like she is still anemic.     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      OB History    Para Term  AB Living   5 5 5 0 0 5   SAB IAB Ectopic Multiple Live Births   0 0 0 0 5      # Outcome Date GA Lbr Bryce/2nd Weight Sex Delivery Anes PTL Lv   5 Term 20 40w1d 07:55 / 00:13 4.252 kg (9 lb 6 oz) F Vag-Spont EPI  JIM      Name: MILIFEMALE-MARK      Apgar1: 9  Apgar5: 9   4 Term 18 41w1d 02:00 / 00:19 3.78 kg (8 lb 5.3 oz) M Vag-Spont EPI N JIM      Name: AMANDA CERVANTES1 MARK      Apgar1: 9  Apgar5: 9   3 Term 09/25/15 39w3d 03:30 / 00:11 3.74 kg (8 lb 3.9 oz) F Vag-Spont EPI  JIM      Apgar1: 6  Apgar5: 9   2 Term 10/23/13 39w4d 05:40 / 00:38 3.465 kg (7 lb 10.2 oz) F Vag-Spont EPI  JIM      Name: ROSENDO CERVANTES      Apgar1: 9  Apgar5: 9   1 Term 12 39w6d 15:00 / 04:33 3.459 kg (7 lb 10 oz) F Vag-Vacuum EPI, Local N JIM      Name: ROSENDO CERVANTES      Apgar1: 8  Apgar5: 9      Obstetric Comments   Hx GDM with all pregnancies.  HTN after delivery in 2018-required medication.  No PPH.                Past GYN history:   Lab Results   Component Value Date    PAP NIL 2020     Past Medical History:   Diagnosis Date     Anemia      Diabetes mellitus (H)     Gestational diabetes diet controlled  "    Tuberculosis     skin test + but chest xray ok       Past Surgical History:   Procedure Laterality Date     GYN SURGERY      ovarian cyst       Family History   Problem Relation Age of Onset     Eye Surgery No family hx of          Medications:    Current Outpatient Medications:      ferrous gluconate (FERGON) 324 (38 Fe) MG tablet, Take 324 mg by mouth daily (with breakfast), Disp: , Rfl:      Prenat-Fe Carbonyl-FA-Omega 3 (ONE-A-DAY WOMENS PRENATAL 1 PO), Take by mouth daily, Disp: , Rfl:      vitamin B-Complex, Take 1 tablet by mouth daily, Disp: , Rfl:      paragard intrauterine copper device, 1 each by Intrauterine route once (Patient not taking: No sig reported), Disp: , Rfl:     Allergies:  Patient has no known allergies.    ROS:  She denies abnormal vaginal bleeding, discharge or unusual pelvic pain, no dysuria, frequency or hematuria.    EXAM:  BP 91/65   Pulse 84   Ht 1.702 m (5' 7\")   Wt 86.6 kg (191 lb)   LMP 03/19/2022 (Approximate)   SpO2 100%   BMI 29.91 kg/m      General - pleasant female in no acute distress.  Neurological - Alert and oriented  Psych:  normal mood and affect.  normal respiratory and cardiovascular effort   Breast - deferred.  Abdomen - soft, nontender, nondistended. No masses  Uterus at U-0   Musculoskeletal - no gross deformities.  Skin- no rashes seen    BEDSIDE ABDOMINAL U/S: done to confirm dates and viability.  There is a single live intrauterine pregnancy noted with  normal  cardiac activity and fetal movement noted. Femur length is c/w 21 wks.         ASSESSMENT:  (O09.529) AMA (advanced maternal age) multigravida 35+, unspecified trimester  (primary encounter diagnosis)  Comment: LMP = 20 wks  And bedside US = 21 wks.    Will send to [facility] for L2 survey and dating.     Plan: [facility] Referral - Pregnancy, Vitamin        D Deficiency, Glucose tolerance, gest screen, 1        hour, ABO/Rh type and screen, Hepatitis B         surface antigen, HIV Antigen " Antibody Combo,         Rubella Antibody IgG, Treponema Abs w Reflex to        RPR and Titer, Urine Culture Aerobic Bacterial    (Z86.32) History of gestational diabetes  Comment: early GCT today    (D50.9) Iron deficiency anemia, unspecified iron deficiency anemia type  Comment: may be related to consecutive pregnancies and elevated copper  Plan: Ferritin, Iron and iron binding capacity, CBC         with platelets    (R79.0) Abnormal blood level of copper  Comment: had copper IUD  Plan: Copper level    (B18.2) Chronic hepatitis C without hepatic coma (H)  Comment:    Plan: Hepatitis C RNA, quantitative     Ayde Cunningham MD

## 2022-07-29 LAB
FERRITIN SERPL-MCNC: 5 NG/ML (ref 12–150)
IRON SATN MFR SERPL: 5 % (ref 15–46)
IRON SERPL-MCNC: 25 UG/DL (ref 35–180)
T PALLIDUM AB SER QL: NONREACTIVE
TIBC SERPL-MCNC: 521 UG/DL (ref 240–430)

## 2022-07-30 LAB
BACTERIA UR CULT: NORMAL
DEPRECATED CALCIDIOL+CALCIFEROL SERPL-MC: 40 UG/L (ref 20–75)
HBV SURFACE AG SERPL QL IA: NONREACTIVE
HIV 1+2 AB+HIV1 P24 AG SERPL QL IA: NONREACTIVE

## 2022-08-01 LAB
HCV RNA SERPL NAA+PROBE-ACNC: NOT DETECTED IU/ML
RUBV IGG SERPL QL IA: 4.52 INDEX
RUBV IGG SERPL QL IA: POSITIVE

## 2022-08-03 LAB — COPPER SERPL-MCNC: 307.1 UG/DL

## 2022-08-08 ENCOUNTER — PRE VISIT (OUTPATIENT)
Dept: MATERNAL FETAL MEDICINE | Facility: CLINIC | Age: 37
End: 2022-08-08

## 2022-08-15 ENCOUNTER — HOSPITAL ENCOUNTER (OUTPATIENT)
Dept: ULTRASOUND IMAGING | Facility: CLINIC | Age: 37
Discharge: HOME | End: 2022-08-15
Attending: OBSTETRICS & GYNECOLOGY
Payer: COMMERCIAL

## 2022-08-15 ENCOUNTER — OFFICE VISIT (OUTPATIENT)
Dept: MATERNAL FETAL MEDICINE | Facility: CLINIC | Age: 37
End: 2022-08-15
Attending: OBSTETRICS & GYNECOLOGY
Payer: COMMERCIAL

## 2022-08-15 DIAGNOSIS — O26.90 PREGNANCY RELATED CONDITION, ANTEPARTUM: ICD-10-CM

## 2022-08-15 DIAGNOSIS — O09.32 LATE PRENATAL CARE AFFECTING PREGNANCY IN SECOND TRIMESTER: ICD-10-CM

## 2022-08-15 DIAGNOSIS — O09.529 ANTEPARTUM MULTIGRAVIDA OF ADVANCED MATERNAL AGE: Primary | ICD-10-CM

## 2022-08-15 PROCEDURE — 76811 OB US DETAILED SNGL FETUS: CPT | Mod: 26 | Performed by: OBSTETRICS & GYNECOLOGY

## 2022-08-15 PROCEDURE — 76811 OB US DETAILED SNGL FETUS: CPT

## 2022-08-15 NOTE — PROGRESS NOTES
"Please see \"Imaging\" tab under \"Chart Review\" for details of today's visit.    Clare Campa    "

## 2022-08-18 ENCOUNTER — OFFICE VISIT (OUTPATIENT)
Dept: OBGYN | Facility: CLINIC | Age: 37
End: 2022-08-18
Payer: COMMERCIAL

## 2022-08-18 VITALS
BODY MASS INDEX: 30.25 KG/M2 | HEIGHT: 67 IN | WEIGHT: 192.7 LBS | SYSTOLIC BLOOD PRESSURE: 109 MMHG | DIASTOLIC BLOOD PRESSURE: 59 MMHG | OXYGEN SATURATION: 100 % | HEART RATE: 78 BPM

## 2022-08-18 DIAGNOSIS — R78.79 HIGH BLOOD COPPER LEVEL: ICD-10-CM

## 2022-08-18 DIAGNOSIS — D50.8 OTHER IRON DEFICIENCY ANEMIA: Primary | ICD-10-CM

## 2022-08-18 DIAGNOSIS — O09.529 AMA (ADVANCED MATERNAL AGE) MULTIGRAVIDA 35+, UNSPECIFIED TRIMESTER: Primary | ICD-10-CM

## 2022-08-18 PROCEDURE — 99213 OFFICE O/P EST LOW 20 MIN: CPT | Performed by: OBSTETRICS & GYNECOLOGY

## 2022-08-18 PROCEDURE — 99000 SPECIMEN HANDLING OFFICE-LAB: CPT | Performed by: OBSTETRICS & GYNECOLOGY

## 2022-08-18 PROCEDURE — 82525 ASSAY OF COPPER: CPT | Mod: 90 | Performed by: OBSTETRICS & GYNECOLOGY

## 2022-08-18 PROCEDURE — 36415 COLL VENOUS BLD VENIPUNCTURE: CPT | Mod: 90 | Performed by: OBSTETRICS & GYNECOLOGY

## 2022-08-18 PROCEDURE — 84630 ASSAY OF ZINC: CPT | Mod: 90 | Performed by: OBSTETRICS & GYNECOLOGY

## 2022-08-18 RX ORDER — CHOLECALCIFEROL (VITAMIN D3) 50 MCG
1 TABLET ORAL DAILY
Qty: 100 TABLET | Refills: 3 | Status: ON HOLD | OUTPATIENT
Start: 2022-08-18 | End: 2022-12-04

## 2022-08-18 RX ORDER — EPINEPHRINE 1 MG/ML
0.3 INJECTION, SOLUTION, CONCENTRATE INTRAVENOUS EVERY 5 MIN PRN
Status: CANCELLED | OUTPATIENT
Start: 2022-08-18

## 2022-08-18 RX ORDER — ALBUTEROL SULFATE 90 UG/1
1-2 AEROSOL, METERED RESPIRATORY (INHALATION)
Status: CANCELLED
Start: 2022-08-18

## 2022-08-18 RX ORDER — HEPARIN SODIUM (PORCINE) LOCK FLUSH IV SOLN 100 UNIT/ML 100 UNIT/ML
5 SOLUTION INTRAVENOUS
Status: CANCELLED | OUTPATIENT
Start: 2022-08-18

## 2022-08-18 RX ORDER — ALBUTEROL SULFATE 0.83 MG/ML
2.5 SOLUTION RESPIRATORY (INHALATION)
Status: CANCELLED | OUTPATIENT
Start: 2022-08-18

## 2022-08-18 RX ORDER — HEPARIN SODIUM,PORCINE 10 UNIT/ML
5 VIAL (ML) INTRAVENOUS
Status: CANCELLED | OUTPATIENT
Start: 2022-08-18

## 2022-08-18 RX ORDER — DIPHENHYDRAMINE HYDROCHLORIDE 50 MG/ML
50 INJECTION INTRAMUSCULAR; INTRAVENOUS
Status: CANCELLED
Start: 2022-08-18

## 2022-08-18 RX ORDER — METHYLPREDNISOLONE SODIUM SUCCINATE 125 MG/2ML
125 INJECTION, POWDER, LYOPHILIZED, FOR SOLUTION INTRAMUSCULAR; INTRAVENOUS
Status: CANCELLED
Start: 2022-08-18

## 2022-08-18 NOTE — Clinical Note
Can you please set up this patient for IV iron infusions at [facility]?  She will need 5 infusions per protocol. Thanks

## 2022-08-18 NOTE — PROGRESS NOTES
SUBJECTIVE:   Patient complains of feeling really tired, she is anemic and is taking iron.  Had to have IV iron with several of her pregnancies. Labs from last visit indicate very high copper.  Previously had a copper IUD so we will check level to see if coming down. High copper is correlated with anemia.    Had a MFM ob US last week 8/15 in [place]. Normal survey.  Estimated Date of Delivery: Dec 15, 2022   Feeling movement which is not c/w her LMP.  Will repeat US in 3 wks to follow growth.   She moved to [place] last October.  Wants to deliver either here or at [facility].   Will get IV iron set up at [facility] and repeat copper and zinc.  Treatment for high copper is increasing her zinc level to compete with the copper receptors.   Patient had GDM with all her pregnancies.  Passed early GCT so will repeat GCT at [place].  Patient lost a lot of weight after last baby so maybe will pass this time.  RR

## 2022-08-20 LAB — ZINC SERPL-MCNC: 52.4 UG/DL

## 2022-08-21 LAB — COPPER SERPL-MCNC: 276.4 UG/DL

## 2022-09-03 ENCOUNTER — HEALTH MAINTENANCE LETTER (OUTPATIENT)
Age: 37
End: 2022-09-03

## 2022-09-06 ENCOUNTER — HOSPITAL ENCOUNTER (OUTPATIENT)
Dept: ULTRASOUND IMAGING | Facility: CLINIC | Age: 37
Discharge: HOME | End: 2022-09-06
Attending: OBSTETRICS & GYNECOLOGY
Payer: COMMERCIAL

## 2022-09-06 ENCOUNTER — OFFICE VISIT (OUTPATIENT)
Dept: MATERNAL FETAL MEDICINE | Facility: CLINIC | Age: 37
End: 2022-09-06
Attending: OBSTETRICS & GYNECOLOGY
Payer: COMMERCIAL

## 2022-09-06 DIAGNOSIS — O09.32 LATE PRENATAL CARE AFFECTING PREGNANCY IN SECOND TRIMESTER: ICD-10-CM

## 2022-09-06 DIAGNOSIS — O09.32 LATE PRENATAL CARE AFFECTING PREGNANCY IN SECOND TRIMESTER: Primary | ICD-10-CM

## 2022-09-06 DIAGNOSIS — O09.529 ANTEPARTUM MULTIGRAVIDA OF ADVANCED MATERNAL AGE: ICD-10-CM

## 2022-09-06 PROCEDURE — 76816 OB US FOLLOW-UP PER FETUS: CPT

## 2022-09-06 PROCEDURE — 76816 OB US FOLLOW-UP PER FETUS: CPT | Mod: 26 | Performed by: OBSTETRICS & GYNECOLOGY

## 2022-09-06 NOTE — PROGRESS NOTES
The patient was seen for an ultrasound in the [facility] at the [facility] [address] today.  For a detailed report of the ultrasound examination, please see the ultrasound report which can be found under the imaging tab.    Lilian Vasquez MD  , OB/GYN  Maternal-Fetal Medicine  [phone] (Pager)

## 2022-09-08 ENCOUNTER — INFUSION THERAPY VISIT (OUTPATIENT)
Dept: INFUSION THERAPY | Facility: CLINIC | Age: 37
End: 2022-09-08
Attending: OBSTETRICS & GYNECOLOGY
Payer: COMMERCIAL

## 2022-09-08 VITALS — HEART RATE: 73 BPM | DIASTOLIC BLOOD PRESSURE: 63 MMHG | TEMPERATURE: 98.2 F | SYSTOLIC BLOOD PRESSURE: 94 MMHG

## 2022-09-08 DIAGNOSIS — D50.8 OTHER IRON DEFICIENCY ANEMIA: Primary | ICD-10-CM

## 2022-09-08 PROCEDURE — 258N000003 HC RX IP 258 OP 636: Performed by: OBSTETRICS & GYNECOLOGY

## 2022-09-08 PROCEDURE — 250N000011 HC RX IP 250 OP 636: Performed by: OBSTETRICS & GYNECOLOGY

## 2022-09-08 PROCEDURE — 96365 THER/PROPH/DIAG IV INF INIT: CPT

## 2022-09-08 RX ORDER — EPINEPHRINE 1 MG/ML
0.3 INJECTION, SOLUTION, CONCENTRATE INTRAVENOUS EVERY 5 MIN PRN
Status: CANCELLED | OUTPATIENT
Start: 2022-09-10

## 2022-09-08 RX ORDER — ALBUTEROL SULFATE 0.83 MG/ML
2.5 SOLUTION RESPIRATORY (INHALATION)
Status: CANCELLED | OUTPATIENT
Start: 2022-09-10

## 2022-09-08 RX ORDER — HEPARIN SODIUM (PORCINE) LOCK FLUSH IV SOLN 100 UNIT/ML 100 UNIT/ML
5 SOLUTION INTRAVENOUS
Status: CANCELLED | OUTPATIENT
Start: 2022-09-10

## 2022-09-08 RX ORDER — METHYLPREDNISOLONE SODIUM SUCCINATE 125 MG/2ML
125 INJECTION, POWDER, LYOPHILIZED, FOR SOLUTION INTRAMUSCULAR; INTRAVENOUS
Status: CANCELLED
Start: 2022-09-10

## 2022-09-08 RX ORDER — ALBUTEROL SULFATE 90 UG/1
1-2 AEROSOL, METERED RESPIRATORY (INHALATION)
Status: CANCELLED
Start: 2022-09-10

## 2022-09-08 RX ORDER — DIPHENHYDRAMINE HYDROCHLORIDE 50 MG/ML
50 INJECTION INTRAMUSCULAR; INTRAVENOUS
Status: CANCELLED
Start: 2022-09-10

## 2022-09-08 RX ORDER — HEPARIN SODIUM,PORCINE 10 UNIT/ML
5 VIAL (ML) INTRAVENOUS
Status: CANCELLED | OUTPATIENT
Start: 2022-09-10

## 2022-09-08 RX ADMIN — IRON SUCROSE 200 MG: 20 INJECTION, SOLUTION INTRAVENOUS at 11:29

## 2022-09-08 ASSESSMENT — PAIN SCALES - GENERAL: PAINLEVEL: NO PAIN (0)

## 2022-09-08 NOTE — PROGRESS NOTES
Infusion Nursing Note:  Mark Cervantes presents today for venofer 200mg 1/5.    Patient seen by provider today: No   present during visit today: Not Applicable.    Note: Patient had a course of venofer in 2018.    Intravenous Access:  Peripheral IV placed.    Treatment Conditions:  Not Applicable.    Post Infusion Assessment:  Patient tolerated infusion without incident.  Patient observed for 30 minutes post first dose, per protocol.  Blood return noted pre and post infusion.  Site patent and intact, free from redness, edema or discomfort.  No evidence of extravasations.  Access discontinued per protocol.     Discharge Plan:   Copy of AVS reviewed with patient and/or family.  Patient will return 9/15/22 for next appointment.  Patient discharged in stable condition accompanied by: self.  Departure Mode: Ambulatory.      Elvira Urban

## 2022-09-15 ENCOUNTER — INFUSION THERAPY VISIT (OUTPATIENT)
Dept: INFUSION THERAPY | Facility: CLINIC | Age: 37
End: 2022-09-15
Attending: OBSTETRICS & GYNECOLOGY
Payer: COMMERCIAL

## 2022-09-15 VITALS
DIASTOLIC BLOOD PRESSURE: 74 MMHG | SYSTOLIC BLOOD PRESSURE: 121 MMHG | RESPIRATION RATE: 16 BRPM | OXYGEN SATURATION: 97 % | HEART RATE: 78 BPM | TEMPERATURE: 97.9 F

## 2022-09-15 DIAGNOSIS — D50.8 OTHER IRON DEFICIENCY ANEMIA: Primary | ICD-10-CM

## 2022-09-15 PROCEDURE — 96365 THER/PROPH/DIAG IV INF INIT: CPT

## 2022-09-15 PROCEDURE — 250N000011 HC RX IP 250 OP 636: Performed by: OBSTETRICS & GYNECOLOGY

## 2022-09-15 PROCEDURE — 258N000003 HC RX IP 258 OP 636: Performed by: OBSTETRICS & GYNECOLOGY

## 2022-09-15 RX ORDER — HEPARIN SODIUM (PORCINE) LOCK FLUSH IV SOLN 100 UNIT/ML 100 UNIT/ML
5 SOLUTION INTRAVENOUS
Status: CANCELLED | OUTPATIENT
Start: 2022-09-16

## 2022-09-15 RX ORDER — METHYLPREDNISOLONE SODIUM SUCCINATE 125 MG/2ML
125 INJECTION, POWDER, LYOPHILIZED, FOR SOLUTION INTRAMUSCULAR; INTRAVENOUS
Status: CANCELLED
Start: 2022-09-16

## 2022-09-15 RX ORDER — DIPHENHYDRAMINE HYDROCHLORIDE 50 MG/ML
50 INJECTION INTRAMUSCULAR; INTRAVENOUS
Status: CANCELLED
Start: 2022-09-16

## 2022-09-15 RX ORDER — ALBUTEROL SULFATE 0.83 MG/ML
2.5 SOLUTION RESPIRATORY (INHALATION)
Status: CANCELLED | OUTPATIENT
Start: 2022-09-16

## 2022-09-15 RX ORDER — EPINEPHRINE 1 MG/ML
0.3 INJECTION, SOLUTION, CONCENTRATE INTRAVENOUS EVERY 5 MIN PRN
Status: CANCELLED | OUTPATIENT
Start: 2022-09-16

## 2022-09-15 RX ORDER — ALBUTEROL SULFATE 90 UG/1
1-2 AEROSOL, METERED RESPIRATORY (INHALATION)
Status: CANCELLED
Start: 2022-09-16

## 2022-09-15 RX ORDER — HEPARIN SODIUM,PORCINE 10 UNIT/ML
5 VIAL (ML) INTRAVENOUS
Status: CANCELLED | OUTPATIENT
Start: 2022-09-16

## 2022-09-15 RX ADMIN — IRON SUCROSE 200 MG: 20 INJECTION, SOLUTION INTRAVENOUS at 11:41

## 2022-09-15 ASSESSMENT — PAIN SCALES - GENERAL: PAINLEVEL: NO PAIN (0)

## 2022-09-15 NOTE — PROGRESS NOTES
Infusion Nursing Note:  Mark Cervantes presents today for venofer 200mg 2/5.    Patient seen by provider today: No   present during visit today: Not Applicable.    Note: N/A.    Intravenous Access:  Peripheral IV placed.    Treatment Conditions:  Not Applicable.    Post Infusion Assessment:  Patient tolerated infusion without incident.  Blood return noted pre and post infusion.  Site patent and intact, free from redness, edema or discomfort.  No evidence of extravasations.  Access discontinued per protocol.     Discharge Plan:   Patient discharged in stable condition accompanied by: self.  Departure Mode: Ambulatory.  RTC 9/20/22.      Elvira Urban

## 2022-09-20 ENCOUNTER — INFUSION THERAPY VISIT (OUTPATIENT)
Dept: INFUSION THERAPY | Facility: CLINIC | Age: 37
End: 2022-09-20
Attending: OBSTETRICS & GYNECOLOGY
Payer: COMMERCIAL

## 2022-09-20 VITALS — TEMPERATURE: 98.1 F | DIASTOLIC BLOOD PRESSURE: 65 MMHG | HEART RATE: 85 BPM | SYSTOLIC BLOOD PRESSURE: 110 MMHG

## 2022-09-20 DIAGNOSIS — D50.8 OTHER IRON DEFICIENCY ANEMIA: Primary | ICD-10-CM

## 2022-09-20 PROCEDURE — 96365 THER/PROPH/DIAG IV INF INIT: CPT

## 2022-09-20 PROCEDURE — 250N000011 HC RX IP 250 OP 636: Performed by: OBSTETRICS & GYNECOLOGY

## 2022-09-20 PROCEDURE — 258N000003 HC RX IP 258 OP 636: Performed by: OBSTETRICS & GYNECOLOGY

## 2022-09-20 RX ORDER — DIPHENHYDRAMINE HYDROCHLORIDE 50 MG/ML
50 INJECTION INTRAMUSCULAR; INTRAVENOUS
Status: CANCELLED
Start: 2022-09-21

## 2022-09-20 RX ORDER — ALBUTEROL SULFATE 0.83 MG/ML
2.5 SOLUTION RESPIRATORY (INHALATION)
Status: CANCELLED | OUTPATIENT
Start: 2022-09-21

## 2022-09-20 RX ORDER — ALBUTEROL SULFATE 90 UG/1
1-2 AEROSOL, METERED RESPIRATORY (INHALATION)
Status: CANCELLED
Start: 2022-09-21

## 2022-09-20 RX ORDER — HEPARIN SODIUM,PORCINE 10 UNIT/ML
5 VIAL (ML) INTRAVENOUS
Status: CANCELLED | OUTPATIENT
Start: 2022-09-21

## 2022-09-20 RX ORDER — METHYLPREDNISOLONE SODIUM SUCCINATE 125 MG/2ML
125 INJECTION, POWDER, LYOPHILIZED, FOR SOLUTION INTRAMUSCULAR; INTRAVENOUS
Status: CANCELLED
Start: 2022-09-21

## 2022-09-20 RX ORDER — EPINEPHRINE 1 MG/ML
0.3 INJECTION, SOLUTION, CONCENTRATE INTRAVENOUS EVERY 5 MIN PRN
Status: CANCELLED | OUTPATIENT
Start: 2022-09-21

## 2022-09-20 RX ORDER — HEPARIN SODIUM (PORCINE) LOCK FLUSH IV SOLN 100 UNIT/ML 100 UNIT/ML
5 SOLUTION INTRAVENOUS
Status: CANCELLED | OUTPATIENT
Start: 2022-09-21

## 2022-09-20 RX ADMIN — IRON SUCROSE 200 MG: 20 INJECTION, SOLUTION INTRAVENOUS at 12:14

## 2022-09-20 ASSESSMENT — PAIN SCALES - GENERAL: PAINLEVEL: NO PAIN (0)

## 2022-09-20 NOTE — PROGRESS NOTES
Infusion Nursing Note:  Mark Cervantes presents today for venofer 200 mg 3/5.    Patient seen by provider today: No   present during visit today: Not Applicable.    Note: N/A.    Intravenous Access:  Peripheral IV placed.    Treatment Conditions:  Not Applicable.    Post Infusion Assessment:  Patient tolerated infusion without incident.  Blood return noted pre and post infusion.  Site patent and intact, free from redness, edema or discomfort.  No evidence of extravasations.  Access discontinued per protocol.     Discharge Plan:   Patient discharged in stable condition accompanied by: self.  Departure Mode: Ambulatory.  RTC 9/22/22.      Elvira Urban

## 2022-09-22 ENCOUNTER — INFUSION THERAPY VISIT (OUTPATIENT)
Dept: INFUSION THERAPY | Facility: CLINIC | Age: 37
End: 2022-09-22
Attending: OBSTETRICS & GYNECOLOGY
Payer: COMMERCIAL

## 2022-09-22 ENCOUNTER — PRENATAL OFFICE VISIT (OUTPATIENT)
Dept: OBGYN | Facility: CLINIC | Age: 37
End: 2022-09-22
Payer: COMMERCIAL

## 2022-09-22 VITALS
SYSTOLIC BLOOD PRESSURE: 103 MMHG | HEART RATE: 78 BPM | RESPIRATION RATE: 16 BRPM | OXYGEN SATURATION: 100 % | DIASTOLIC BLOOD PRESSURE: 58 MMHG

## 2022-09-22 VITALS
OXYGEN SATURATION: 97 % | BODY MASS INDEX: 32.05 KG/M2 | HEART RATE: 56 BPM | WEIGHT: 199.4 LBS | DIASTOLIC BLOOD PRESSURE: 63 MMHG | SYSTOLIC BLOOD PRESSURE: 102 MMHG | HEIGHT: 66 IN

## 2022-09-22 DIAGNOSIS — D50.8 OTHER IRON DEFICIENCY ANEMIA: Primary | ICD-10-CM

## 2022-09-22 DIAGNOSIS — D50.9 IRON DEFICIENCY ANEMIA, UNSPECIFIED IRON DEFICIENCY ANEMIA TYPE: ICD-10-CM

## 2022-09-22 DIAGNOSIS — R78.79 HIGH BLOOD COPPER LEVEL: ICD-10-CM

## 2022-09-22 DIAGNOSIS — O99.891 BACK PAIN IN PREGNANCY: ICD-10-CM

## 2022-09-22 DIAGNOSIS — M54.9 BACK PAIN IN PREGNANCY: ICD-10-CM

## 2022-09-22 DIAGNOSIS — O09.529 AMA (ADVANCED MATERNAL AGE) MULTIGRAVIDA 35+, UNSPECIFIED TRIMESTER: Primary | ICD-10-CM

## 2022-09-22 LAB
ERYTHROCYTE [DISTWIDTH] IN BLOOD BY AUTOMATED COUNT: 20 % (ref 10–15)
HCT VFR BLD AUTO: 31.9 % (ref 35–47)
HGB BLD-MCNC: 9.8 G/DL (ref 11.7–15.7)
MCH RBC QN AUTO: 24.5 PG (ref 26.5–33)
MCHC RBC AUTO-ENTMCNC: 30.7 G/DL (ref 31.5–36.5)
MCV RBC AUTO: 80 FL (ref 78–100)
PLATELET # BLD AUTO: 213 10E3/UL (ref 150–450)
RBC # BLD AUTO: 4 10E6/UL (ref 3.8–5.2)
WBC # BLD AUTO: 10.9 10E3/UL (ref 4–11)

## 2022-09-22 PROCEDURE — 99213 OFFICE O/P EST LOW 20 MIN: CPT | Performed by: OBSTETRICS & GYNECOLOGY

## 2022-09-22 PROCEDURE — 82525 ASSAY OF COPPER: CPT | Mod: 90 | Performed by: OBSTETRICS & GYNECOLOGY

## 2022-09-22 PROCEDURE — 99000 SPECIMEN HANDLING OFFICE-LAB: CPT | Performed by: OBSTETRICS & GYNECOLOGY

## 2022-09-22 PROCEDURE — 82607 VITAMIN B-12: CPT | Performed by: OBSTETRICS & GYNECOLOGY

## 2022-09-22 PROCEDURE — 82390 ASSAY OF CERULOPLASMIN: CPT | Performed by: OBSTETRICS & GYNECOLOGY

## 2022-09-22 PROCEDURE — 96365 THER/PROPH/DIAG IV INF INIT: CPT

## 2022-09-22 PROCEDURE — 36415 COLL VENOUS BLD VENIPUNCTURE: CPT | Performed by: OBSTETRICS & GYNECOLOGY

## 2022-09-22 PROCEDURE — 250N000011 HC RX IP 250 OP 636: Performed by: OBSTETRICS & GYNECOLOGY

## 2022-09-22 PROCEDURE — 258N000003 HC RX IP 258 OP 636: Performed by: OBSTETRICS & GYNECOLOGY

## 2022-09-22 PROCEDURE — 85027 COMPLETE CBC AUTOMATED: CPT | Performed by: OBSTETRICS & GYNECOLOGY

## 2022-09-22 PROCEDURE — 84630 ASSAY OF ZINC: CPT | Mod: 90 | Performed by: OBSTETRICS & GYNECOLOGY

## 2022-09-22 PROCEDURE — 80053 COMPREHEN METABOLIC PANEL: CPT | Performed by: OBSTETRICS & GYNECOLOGY

## 2022-09-22 RX ORDER — HEPARIN SODIUM (PORCINE) LOCK FLUSH IV SOLN 100 UNIT/ML 100 UNIT/ML
5 SOLUTION INTRAVENOUS
Status: CANCELLED | OUTPATIENT
Start: 2022-09-24

## 2022-09-22 RX ORDER — METHYLPREDNISOLONE SODIUM SUCCINATE 125 MG/2ML
125 INJECTION, POWDER, LYOPHILIZED, FOR SOLUTION INTRAMUSCULAR; INTRAVENOUS
Status: CANCELLED
Start: 2022-09-24

## 2022-09-22 RX ORDER — ALBUTEROL SULFATE 90 UG/1
1-2 AEROSOL, METERED RESPIRATORY (INHALATION)
Status: CANCELLED
Start: 2022-09-24

## 2022-09-22 RX ORDER — ALBUTEROL SULFATE 0.83 MG/ML
2.5 SOLUTION RESPIRATORY (INHALATION)
Status: CANCELLED | OUTPATIENT
Start: 2022-09-24

## 2022-09-22 RX ORDER — DIPHENHYDRAMINE HYDROCHLORIDE 50 MG/ML
50 INJECTION INTRAMUSCULAR; INTRAVENOUS
Status: CANCELLED
Start: 2022-09-24

## 2022-09-22 RX ORDER — HEPARIN SODIUM,PORCINE 10 UNIT/ML
5 VIAL (ML) INTRAVENOUS
Status: CANCELLED | OUTPATIENT
Start: 2022-09-24

## 2022-09-22 RX ORDER — EPINEPHRINE 1 MG/ML
0.3 INJECTION, SOLUTION, CONCENTRATE INTRAVENOUS EVERY 5 MIN PRN
Status: CANCELLED | OUTPATIENT
Start: 2022-09-24

## 2022-09-22 RX ADMIN — IRON SUCROSE 200 MG: 20 INJECTION, SOLUTION INTRAVENOUS at 12:20

## 2022-09-22 ASSESSMENT — PAIN SCALES - GENERAL: PAINLEVEL: NO PAIN (0)

## 2022-09-22 NOTE — PROGRESS NOTES
Infusion Nursing Note:  Mark Cervantes presents today for Venofer.    Patient seen by provider today: No   present during visit today: Not Applicable.    Note: N/A.    Intravenous Access:  Peripheral IV placed.    Treatment Conditions:  Not Applicable.    Post Infusion Assessment:  Patient tolerated infusion without incident.  Site patent and intact, free from redness, edema or discomfort.  Access discontinued per protocol.     Discharge Plan:   Patient and/or family verbalized understanding of discharge instructions and all questions answered.  Patient discharged in stable condition accompanied by: self.      MARIJA DE LOS SANTOS RN

## 2022-09-22 NOTE — PROGRESS NOTES
28w2d   complains of feeling really tired and more pelvic pressure.  Baby is moving ok.  Getting IV iron, has 1 infusion left after today. Her copper level remains high. After the copper IUD was removed in march this year, the copper level was nearly 2x the normal level -- up to 305.   Last level checked was lower but still well over upper limits of normal.    last MFM US growth at 89%.  Passed her GCT!! Had GDM with the other pregnancies.      ASSESSMENT/PLAN:   (O09.529) AMA (advanced maternal age) multigravida 35+, unspecified trimester  (primary encounter diagnosis)  Comment:  Passed her GCT!! Had GDM with the other pregnancies.    Plan: Vitamin B12         (R78.79) High blood copper level  Comment:  discussed with [facility] staff today who recommends referral to GI for eval of Edgardo's or other cause for high copper.   Plan: Comprehensive metabolic panel (BMP + Alb, Alk         Phos, ALT, AST, Total. Bili, TP),         Ceruloplasmin, CBC with platelets, Copper         level, Zinc, Adult GI  Referral -         Consult Only       (D50.9) Iron deficiency anemia, unspecified iron deficiency anemia type  Comment: iv iron infusions     (O99.891,  M54.9) Back pain in pregnancy  Comment pelvic pressure   Plan: Miscellaneous Order for DME - ONLY FOR DME   Patient fitted for a maternity support belt today. RTC 2 wks.       Ayde Cunningham MD

## 2022-09-23 LAB
ALBUMIN SERPL-MCNC: 2.6 G/DL (ref 3.4–5)
ALP SERPL-CCNC: 82 U/L (ref 40–150)
ALT SERPL W P-5'-P-CCNC: 23 U/L (ref 0–50)
ANION GAP SERPL CALCULATED.3IONS-SCNC: 7 MMOL/L (ref 3–14)
AST SERPL W P-5'-P-CCNC: 15 U/L (ref 0–45)
BILIRUB SERPL-MCNC: 0.3 MG/DL (ref 0.2–1.3)
BUN SERPL-MCNC: 6 MG/DL (ref 7–30)
CALCIUM SERPL-MCNC: 8.4 MG/DL (ref 8.5–10.1)
CHLORIDE BLD-SCNC: 110 MMOL/L (ref 94–109)
CO2 SERPL-SCNC: 21 MMOL/L (ref 20–32)
CREAT SERPL-MCNC: 0.3 MG/DL (ref 0.52–1.04)
GFR SERPL CREATININE-BSD FRML MDRD: >90 ML/MIN/1.73M2
GLUCOSE BLD-MCNC: 83 MG/DL (ref 70–99)
POTASSIUM BLD-SCNC: 3.8 MMOL/L (ref 3.4–5.3)
PROT SERPL-MCNC: 6.7 G/DL (ref 6.8–8.8)
SODIUM SERPL-SCNC: 138 MMOL/L (ref 133–144)
VIT B12 SERPL-MCNC: 392 PG/ML (ref 232–1245)

## 2022-09-26 ENCOUNTER — INFUSION THERAPY VISIT (OUTPATIENT)
Dept: INFUSION THERAPY | Facility: CLINIC | Age: 37
End: 2022-09-26
Attending: OBSTETRICS & GYNECOLOGY
Payer: COMMERCIAL

## 2022-09-26 VITALS — HEART RATE: 75 BPM | OXYGEN SATURATION: 97 % | SYSTOLIC BLOOD PRESSURE: 103 MMHG | DIASTOLIC BLOOD PRESSURE: 60 MMHG

## 2022-09-26 DIAGNOSIS — D50.8 OTHER IRON DEFICIENCY ANEMIA: Primary | ICD-10-CM

## 2022-09-26 LAB
CERULOPLASMIN SERPL-MCNC: 63 MG/DL (ref 20–60)
ZINC SERPL-MCNC: 58.8 UG/DL

## 2022-09-26 PROCEDURE — 250N000011 HC RX IP 250 OP 636: Performed by: OBSTETRICS & GYNECOLOGY

## 2022-09-26 PROCEDURE — 258N000003 HC RX IP 258 OP 636: Performed by: OBSTETRICS & GYNECOLOGY

## 2022-09-26 PROCEDURE — 96365 THER/PROPH/DIAG IV INF INIT: CPT

## 2022-09-26 RX ORDER — EPINEPHRINE 1 MG/ML
0.3 INJECTION, SOLUTION, CONCENTRATE INTRAVENOUS EVERY 5 MIN PRN
Status: CANCELLED | OUTPATIENT
Start: 2022-09-26

## 2022-09-26 RX ORDER — HEPARIN SODIUM (PORCINE) LOCK FLUSH IV SOLN 100 UNIT/ML 100 UNIT/ML
5 SOLUTION INTRAVENOUS
Status: CANCELLED | OUTPATIENT
Start: 2022-09-26

## 2022-09-26 RX ORDER — ALBUTEROL SULFATE 90 UG/1
1-2 AEROSOL, METERED RESPIRATORY (INHALATION)
Status: CANCELLED
Start: 2022-09-26

## 2022-09-26 RX ORDER — METHYLPREDNISOLONE SODIUM SUCCINATE 125 MG/2ML
125 INJECTION, POWDER, LYOPHILIZED, FOR SOLUTION INTRAMUSCULAR; INTRAVENOUS
Status: CANCELLED
Start: 2022-09-26

## 2022-09-26 RX ORDER — DIPHENHYDRAMINE HYDROCHLORIDE 50 MG/ML
50 INJECTION INTRAMUSCULAR; INTRAVENOUS
Status: CANCELLED
Start: 2022-09-26

## 2022-09-26 RX ORDER — ALBUTEROL SULFATE 0.83 MG/ML
2.5 SOLUTION RESPIRATORY (INHALATION)
Status: CANCELLED | OUTPATIENT
Start: 2022-09-26

## 2022-09-26 RX ORDER — HEPARIN SODIUM,PORCINE 10 UNIT/ML
5 VIAL (ML) INTRAVENOUS
Status: CANCELLED | OUTPATIENT
Start: 2022-09-26

## 2022-09-26 RX ADMIN — IRON SUCROSE 200 MG: 20 INJECTION, SOLUTION INTRAVENOUS at 12:39

## 2022-09-26 NOTE — PROGRESS NOTES
Infusion Nursing Note:  Mark Cervantes presents today for Venofer.    Patient seen by provider today: No   present during visit today: Not Applicable.    Note: N/A.    Intravenous Access:  Peripheral IV placed.    Treatment Conditions:  Not Applicable.    Post Infusion Assessment:  Patient tolerated infusion without incident.  No evidence of extravasations.  Access discontinued per protocol.     Discharge Plan:   Patient and/or family verbalized understanding of discharge instructions and all questions answered.  Patient discharged in stable condition accompanied by: self.      MARIJA DE LOS SANTOS RN

## 2022-09-27 LAB — COPPER SERPL-MCNC: 268.7 UG/DL

## 2022-09-29 ENCOUNTER — TELEPHONE (OUTPATIENT)
Dept: OBGYN | Facility: CLINIC | Age: 37
End: 2022-09-29

## 2022-09-29 NOTE — TELEPHONE ENCOUNTER
I called patient to review lab results but no answer. Message left for her to check her mychart message   Ayde Cunningham MD

## 2022-09-29 NOTE — TELEPHONE ENCOUNTER
Write reached out to GI clinic regarding referral. No one from GI has reached out from the 9/22 referral. Based on , the care team is reviewing the referral and it may take 1-2 weeks to determine urgency of patient being seen.

## 2022-10-13 ENCOUNTER — TELEPHONE (OUTPATIENT)
Dept: GASTROENTEROLOGY | Facility: CLINIC | Age: 37
End: 2022-10-13

## 2022-10-13 NOTE — TELEPHONE ENCOUNTER
JAYSON and Flare3dhart message for PT to call [phone] to reschedule the Dr. Gill appt. I did mention PT can schedule with [facility] for Nov.

## 2022-10-21 ENCOUNTER — TELEPHONE (OUTPATIENT)
Dept: OBGYN | Facility: CLINIC | Age: 37
End: 2022-10-21

## 2022-10-21 NOTE — TELEPHONE ENCOUNTER
----- Message from Ayde Cunningham MD sent at 10/21/2022  8:18 AM CDT -----  Ok to keep as scheduled, just hope she goes to the appt.   Can you please call her and put her in my schedule for her next ob appt.  I could try to see her on my call day this coming Monday or next Friday. RR   ----- Message -----  From: Namrata Will RN  Sent: 10/20/2022   9:13 AM CDT  To: Ayde Cunningham MD    Pt had appt with GI set up, but had to be rescheduled to 11/17. RN routing to provider regarding need for pt to get in sooner or if OK to keep as scheduled.

## 2022-10-26 NOTE — PROGRESS NOTES
33w2d  Overall, doing well.  Has some pregnancy discomforts, but has maternity support belt already.  Decreased FM today, so NST performed.  Reactive.  135/mod/+ accels, no decels.  Has appointment with GI for elevated copper in Nov.   Flu and Tdap today.  Will check growth US with next visit.  RTC 2w.  Katiana Schuler MD

## 2022-10-27 ENCOUNTER — PRENATAL OFFICE VISIT (OUTPATIENT)
Dept: OBGYN | Facility: CLINIC | Age: 37
End: 2022-10-27
Payer: COMMERCIAL

## 2022-10-27 VITALS
BODY MASS INDEX: 33.2 KG/M2 | HEART RATE: 67 BPM | WEIGHT: 205.7 LBS | SYSTOLIC BLOOD PRESSURE: 100 MMHG | DIASTOLIC BLOOD PRESSURE: 66 MMHG | OXYGEN SATURATION: 100 %

## 2022-10-27 DIAGNOSIS — O36.8130 DECREASED FETAL MOVEMENTS IN THIRD TRIMESTER, SINGLE OR UNSPECIFIED FETUS: ICD-10-CM

## 2022-10-27 DIAGNOSIS — O09.523 MULTIGRAVIDA OF ADVANCED MATERNAL AGE IN THIRD TRIMESTER: ICD-10-CM

## 2022-10-27 DIAGNOSIS — R78.79 HIGH BLOOD COPPER LEVEL: ICD-10-CM

## 2022-10-27 DIAGNOSIS — Z23 NEED FOR PROPHYLACTIC VACCINATION AND INOCULATION AGAINST INFLUENZA: ICD-10-CM

## 2022-10-27 DIAGNOSIS — O09.529 AMA (ADVANCED MATERNAL AGE) MULTIGRAVIDA 35+, UNSPECIFIED TRIMESTER: Primary | ICD-10-CM

## 2022-10-27 DIAGNOSIS — Z23 NEED FOR TDAP VACCINATION: ICD-10-CM

## 2022-10-27 PROCEDURE — 59025 FETAL NON-STRESS TEST: CPT | Performed by: OBSTETRICS & GYNECOLOGY

## 2022-10-27 PROCEDURE — 90471 IMMUNIZATION ADMIN: CPT | Performed by: OBSTETRICS & GYNECOLOGY

## 2022-10-27 PROCEDURE — 90472 IMMUNIZATION ADMIN EACH ADD: CPT | Performed by: OBSTETRICS & GYNECOLOGY

## 2022-10-27 PROCEDURE — 90686 IIV4 VACC NO PRSV 0.5 ML IM: CPT | Performed by: OBSTETRICS & GYNECOLOGY

## 2022-10-27 PROCEDURE — 99212 OFFICE O/P EST SF 10 MIN: CPT | Mod: 25 | Performed by: OBSTETRICS & GYNECOLOGY

## 2022-10-27 PROCEDURE — 90715 TDAP VACCINE 7 YRS/> IM: CPT | Performed by: OBSTETRICS & GYNECOLOGY

## 2022-10-27 RX ORDER — PRENATAL VIT/IRON FUM/FOLIC AC 27MG-0.8MG
1 TABLET ORAL DAILY
Qty: 90 TABLET | Refills: 3 | Status: SHIPPED | OUTPATIENT
Start: 2022-10-27

## 2022-11-01 ENCOUNTER — TELEPHONE (OUTPATIENT)
Dept: OBGYN | Facility: CLINIC | Age: 37
End: 2022-11-01

## 2022-11-01 NOTE — TELEPHONE ENCOUNTER
----- Message from Nydia Adkins sent at 11/1/2022  8:50 AM CDT -----  Regarding: RE: needs next ob appt  Called pt to schedule, no answer, Left vm   ----- Message -----  From: Ayde Cunningham MD  Sent: 10/31/2022   5:52 PM CDT  To: Rd Reception  Subject: needs next ob appt                               Can  you please call Ramosziggy and help her get set up for next few ob appts?  I can see her on Monday 11/14 in the afternoon chart review spot.  Then after that she will need weekly OB visits with me.  She can see me either at [facility] or [facility].  Thanks

## 2022-11-08 ENCOUNTER — PRENATAL OFFICE VISIT (OUTPATIENT)
Dept: OBGYN | Facility: CLINIC | Age: 37
End: 2022-11-08
Attending: OBSTETRICS & GYNECOLOGY
Payer: COMMERCIAL

## 2022-11-08 ENCOUNTER — ANCILLARY PROCEDURE (OUTPATIENT)
Dept: ULTRASOUND IMAGING | Facility: CLINIC | Age: 37
End: 2022-11-08
Attending: OBSTETRICS & GYNECOLOGY
Payer: COMMERCIAL

## 2022-11-08 VITALS
OXYGEN SATURATION: 100 % | BODY MASS INDEX: 33.35 KG/M2 | DIASTOLIC BLOOD PRESSURE: 66 MMHG | HEART RATE: 83 BPM | WEIGHT: 206.6 LBS | SYSTOLIC BLOOD PRESSURE: 86 MMHG

## 2022-11-08 DIAGNOSIS — O09.523 MULTIGRAVIDA OF ADVANCED MATERNAL AGE IN THIRD TRIMESTER: Primary | ICD-10-CM

## 2022-11-08 DIAGNOSIS — D50.9 IRON DEFICIENCY ANEMIA, UNSPECIFIED IRON DEFICIENCY ANEMIA TYPE: ICD-10-CM

## 2022-11-08 DIAGNOSIS — O09.523 MULTIGRAVIDA OF ADVANCED MATERNAL AGE IN THIRD TRIMESTER: ICD-10-CM

## 2022-11-08 DIAGNOSIS — J02.0 STREPTOCOCCAL SORE THROAT: ICD-10-CM

## 2022-11-08 LAB
DEPRECATED S PYO AG THROAT QL EIA: POSITIVE
ERYTHROCYTE [DISTWIDTH] IN BLOOD BY AUTOMATED COUNT: 19.2 % (ref 10–15)
HCT VFR BLD AUTO: 33 % (ref 35–47)
HGB BLD-MCNC: 10.9 G/DL (ref 11.7–15.7)
MCH RBC QN AUTO: 26.4 PG (ref 26.5–33)
MCHC RBC AUTO-ENTMCNC: 33 G/DL (ref 31.5–36.5)
MCV RBC AUTO: 80 FL (ref 78–100)
PLATELET # BLD AUTO: 242 10E3/UL (ref 150–450)
RBC # BLD AUTO: 4.13 10E6/UL (ref 3.8–5.2)
WBC # BLD AUTO: 9.5 10E3/UL (ref 4–11)

## 2022-11-08 PROCEDURE — 76816 OB US FOLLOW-UP PER FETUS: CPT | Performed by: OBSTETRICS & GYNECOLOGY

## 2022-11-08 PROCEDURE — 36415 COLL VENOUS BLD VENIPUNCTURE: CPT | Performed by: OBSTETRICS & GYNECOLOGY

## 2022-11-08 PROCEDURE — 87880 STREP A ASSAY W/OPTIC: CPT | Performed by: OBSTETRICS & GYNECOLOGY

## 2022-11-08 PROCEDURE — 85027 COMPLETE CBC AUTOMATED: CPT | Performed by: OBSTETRICS & GYNECOLOGY

## 2022-11-08 PROCEDURE — 99213 OFFICE O/P EST LOW 20 MIN: CPT | Performed by: OBSTETRICS & GYNECOLOGY

## 2022-11-08 RX ORDER — AMOXICILLIN 500 MG/1
500 CAPSULE ORAL 2 TIMES DAILY
Qty: 20 CAPSULE | Refills: 0 | Status: SHIPPED | OUTPATIENT
Start: 2022-11-08 | End: 2022-11-18

## 2022-11-08 NOTE — PROGRESS NOTES
35w0d feeling ok, tired.  Good fm.  Had growth us today, 52%tile, 5lb12 oz.  Cephalic, normal MVP.  She reports that after her last visit she began feeling sick, took strep, flu and covid swab at work (not documented) and was positive for strep throat.  Used honey to treat and felt better.  Then felt body aches and was positive for influenza A.  Feeling better now.  Discussed concern about untreated strep, will reswab today, if positive, would need Abx.  May be too late to avoid colonization, so would need close monitoring post partum.    Appt with GI next week.  Will check CBC today as she completed her iv infusions >4 weeks ago.  GBS next visit. RTC weekly  charley

## 2022-11-14 ENCOUNTER — DOCUMENTATION ONLY (OUTPATIENT)
Dept: LAB | Facility: CLINIC | Age: 37
End: 2022-11-14

## 2022-11-14 NOTE — PROGRESS NOTES
Mark Cervantes has an upcoming lab appointment:    Future Appointments   Date Time Provider Department Center   11/17/2022 11:45 AM [facility]    11/17/2022  1:15 PM Mary Jane Dee PA-C [facility]    11/23/2022  9:30 AM Katiana Thibodeaux MD [facility]   11/29/2022 10:45 AM Katiana Schuler MD [facility]     Patient is scheduled for the following lab(s): pt is requesting labs from Dr. Gill. Please order if necessary, thank you    There is no order available. Please review and place either future orders or HMPO (Review of Health Maintenance Protocol Orders), as appropriate.    Health Maintenance Due   Topic     ANNUAL REVIEW OF HM ORDERS      Shira Conn

## 2022-11-17 ENCOUNTER — APPOINTMENT (OUTPATIENT)
Dept: LAB | Facility: CLINIC | Age: 37
End: 2022-11-17
Payer: COMMERCIAL

## 2022-11-17 ENCOUNTER — OFFICE VISIT (OUTPATIENT)
Dept: GASTROENTEROLOGY | Facility: CLINIC | Age: 37
End: 2022-11-17
Attending: OBSTETRICS & GYNECOLOGY
Payer: COMMERCIAL

## 2022-11-17 VITALS
BODY MASS INDEX: 33.65 KG/M2 | WEIGHT: 208.5 LBS | SYSTOLIC BLOOD PRESSURE: 106 MMHG | HEART RATE: 81 BPM | DIASTOLIC BLOOD PRESSURE: 68 MMHG | OXYGEN SATURATION: 99 %

## 2022-11-17 DIAGNOSIS — R94.5 NONSPECIFIC ABNORMAL RESULTS OF LIVER FUNCTION STUDY: Primary | ICD-10-CM

## 2022-11-17 PROBLEM — B18.2 HEPATITIS C, CHRONIC (H): Status: RESOLVED | Noted: 2022-03-14 | Resolved: 2022-11-17

## 2022-11-17 PROCEDURE — 99204 OFFICE O/P NEW MOD 45 MIN: CPT | Performed by: PHYSICIAN ASSISTANT

## 2022-11-17 NOTE — PROGRESS NOTES
US 5 children , oldest 10 , youngest 2    Feeling tired.     Appetite is okay.     Normal weight gain.     Feeling really tired.   No swelling   Regular bowel movements.     Had flu A and strep two weeks ago, so had body aches. Has one more day of     Taking pre-jordan. Last iron infusion in September     Work as a medical technologist.     - No tremors.   -

## 2022-11-17 NOTE — LETTER
11/17/2022         RE: Mark Cervantes  [id number] [facility] [id number]        Dear Colleague,    Thank you for referring your patient, Mark Cervantes, to the [facility] [place]. Please see a copy of my visit note below.    Hepatology Clinic Note  Mark Cervantes   Date of Birth 1985  Date of Service 11/17/2022    REASON FOR CONSULTATION: elevated copper  REFERRING PROVIDER: Ayde Cunningham MD          Assessment/plan:   Mark Cervantes is a 37 year old female with history of elevated copper and ceruloplasmin of unknown significance. Copper is 95% bound to ceruloplasmin. Ceruloplasmin is estrogen-sensitive, and levels are elevated in pregnancy.  Ceruloplasmin is typically low Edgardo's disease. Her liver transaminases have historically been normal.  She does not have any neurological changes, mental health concerns.  Overall, very low suspicion for Edgardo's disease.    # Do not recommend routine monitoring of copper     # Anemia, consistent with iron deficiency.  - Defer monitoring ferritin to her OB provider  - Could consider blood smear, reticulocytes, LDH, oneida to rule out hemolysis which is associated with Edgardo's     # US abdomen in 4 months postpartum to assess for steatosis     - Follow-up in Hepatology clinic as needed    Mary Jane Dee PA-C   [facility] Hepatology    Total time for E/M services performed on the date of the encounter 45 minutes.  This included review of previous: clinic visits, hospital records, lab results, imaging studies, and procedural documentation. Time also includes patient visit, documentation.  The findings from this review are summarized in the above note.   -----------------------------------------------------       HPI:   Mark Cervantes is a 37 year old female  presenting for evaluation and treatment of elevated ceruloplasmin and copper.    Appetite is normal.  She has had appropriate weight gain throughout pregnancy.     She  is feeling very fatigued.  Taking her prenatal with iron.  Last IV iron infusion in September 2022.  She has regular bowel movements.     Patient denies jaundice, lower extremity edema, abdominal distension or confusion.      Patient also denies melena, hematochezia or hematemesis.    Patient denies weight loss, fevers, sweats or chills.     PMH: Iron deficiency, gestational diabetes with previous pregnancies, history of preeclampsia    SMH: No significant surgical history    Medications:   See below    No previous tobacco use.  She does not drink alcohol no previous IV/IN drug use. Currently works as a medical technologist on the weekends. Patient currently lives with her  and 5 children, aged 2-10.  No known family history of liver disease or liver cancer.    Lab work-up thus far  HCV antibody: reactive  HCV RNA non detectable x 2  HBV SAb:  HBV SAg: Nonreactive  HBV CAb:  Ceruloplasmin 63     Medical hx Surgical hx   Past Medical History:   Diagnosis Date     Anemia      Diabetes mellitus (H)      Tuberculosis     Past Surgical History:   Procedure Laterality Date     GYN SURGERY      ovarian cyst                 Medications:     Current Outpatient Medications   Medication     amoxicillin (AMOXIL) 500 MG capsule     Prenatal Vit-Fe Fumarate-FA (PRENATAL MULTIVITAMIN W/IRON) 27-0.8 MG tablet     ferrous gluconate (FERGON) 324 (38 Fe) MG tablet     paragard intrauterine copper device     Prenat-Fe Carbonyl-FA-Omega 3 (ONE-A-DAY WOMENS PRENATAL 1 PO)     vitamin B-Complex     vitamin D3 (CHOLECALCIFEROL) 50 mcg (2000 units) tablet     No current facility-administered medications for this visit.            Allergies:   No Known Allergies         Social History:     Social History     Socioeconomic History     Marital status:      Spouse name: Delfino Mak     Number of children: Not on file     Years of education: Not on file     Highest education level: Not on file   Occupational History     Not on  file   Tobacco Use     Smoking status: Never     Smokeless tobacco: Never   Substance and Sexual Activity     Alcohol use: No     Drug use: No     Sexual activity: Yes     Partners: Male   Other Topics Concern     Not on file   Social History Narrative     Not on file     Social Determinants of Health     Financial Resource Strain: Not on file   Food Insecurity: Not on file   Transportation Needs: Not on file   Physical Activity: Not on file   Stress: Not on file   Social Connections: Not on file   Intimate Partner Violence: Not on file   Housing Stability: Not on file            Family History:     Family History   Problem Relation Age of Onset     Eye Surgery No family hx of             Review of Systems:   GEN: See HPI  HEENT: No change in vision or hearing, mouth sores, dysphagia, lymph nodes  Resp: No shortness of breath, coughing, hx of asthma  CV: No chest pain, palpitations, syncope   GI: See HPI  : No dysuria, history of stones, urine color    Skin: No rash; no pruritus or psoriasis  MS: No arthralgias, myalgias, joint swelling  Neuro: No memory changes, confusion, numbness    Heme: No difficulty clotting, bruising, bleeding  Psych:  No anxiety, depression, agitation          Physical Exam:   VS:  /68   Pulse 81   Wt 94.6 kg (208 lb 8 oz)   LMP 03/19/2022 (Approximate)   SpO2 99%   BMI 33.65 kg/m        Gen: A&Ox3, NAD, well developed  HEENT: non-icteric   CV: RRR, no overt murmurs  Lung: no conversational dyspnea  Lym- no palpable lymphadenopathy  Abd: gravid abdomen   Ext: no edema, intact pulses.   Skin: No rash, no palmar erythema, telangiectasias or jaundice  Neuro: grossly intact, no asterixis   Psych: appropriate mood and affects         Data:   Reviewed in person and significant for:    Lab Results   Component Value Date     09/22/2022     03/31/2020      Lab Results   Component Value Date    POTASSIUM 3.8 09/22/2022    POTASSIUM 3.8 03/31/2020     Lab Results   Component  Value Date    CHLORIDE 110 2022    CHLORIDE 107 2020     Lab Results   Component Value Date    CO2 21 2022    CO2 22 2020     Lab Results   Component Value Date    BUN 6 2022    BUN 6 2020     Lab Results   Component Value Date    CR 0.30 2022    CR 0.34 2020       Lab Results   Component Value Date    WBC 9.5 2022    WBC 9.8 2020     Lab Results   Component Value Date    HGB 10.9 2022    HGB 9.6 2020     Lab Results   Component Value Date    HCT 33.0 2022    HCT 34.4 2020     Lab Results   Component Value Date    MCV 80 2022    MCV 78 2020     Lab Results   Component Value Date     2022     2020       Lab Results   Component Value Date    AST 15 2022    AST 20 2020     Lab Results   Component Value Date    ALT 23 2022    ALT 19 2020     No results found for: BILICONJ   Lab Results   Component Value Date    BILITOTAL 0.3 2022       Lab Results   Component Value Date    ALBUMIN 2.6 2022     Lab Results   Component Value Date    PROTTOTAL 6.7 2022      Lab Results   Component Value Date    ALKPHOS 82 2022       No results found for: INR    No recent abdominal imaging    US 5 children , oldest 10 , youngest 2    Feeling tired.     Appetite is okay.     Normal weight gain.     Feeling really tired.   No swelling   Regular bowel movements.     Had flu A and strep two weeks ago, so had body aches. Has one more day of     Taking pre-jordan. Last iron infusion in September     Work as a medical technologist.     - No tremors.   -         Again, thank you for allowing me to participate in the care of your patient.        Sincerely,        Mary Jane Dee PA-C

## 2022-11-17 NOTE — PROGRESS NOTES
Occupational Therapy  Visit Type: treatment  Co-treat with: Physical therapist  Precautions:  Medical precautions:  fall risk; standard precautions and contact precautions.  Activity as tolerated  Lines:     Basic: IV, indwelling urinary catheter and O2    Complex: Cpap      Lines in chart and on patient reviewed, precautions maintained throughout session.    SUBJECTIVE  Patient agreed to participate in therapy this date.  \"i'd be in bed about 21 hours a day\"  Patient / Family Goal: maximize function    OBJECTIVE    Bed mobility:    Rolling left: total assist - dependent  (mod assist X4)    Rolling right: total assist - dependent  (moderate assist X4)  Training completed:      Assisted CNA and nursing student with bed mobility to allow for pt back to be cleaned and bed linen change      Interventions    Skilled input: verbal instruction/cues  Verbal Consent: Writer verbally educated and received verbal consent for hand placement, positioning of patient, and techniques to be performed today from patient for therapist position for techniques as described above and how they are pertinent to the patient's plan of care.        ASSESSMENT    Impairments: bed mobility, body habitus and activity tolerance  Functional Limitations: bed mobility, functional mobility, bathing, toileting and functional transfers  Recommended Consults: OT: None    Discharge Recommendations:  Recommendations for Discharge: OT WI: Post acute therapy  PT/OT Mobility Equipment for Discharge: has 4ww  PT/OT ADL Equipment for Discharge: will continue to assess  OT Identified Barriers to Discharge: weakness, activity tolerance  Pt seen this date with PT, CNA and nursing student.  Pt requiring moderate of assistance X 4 for rolling right and left for bed change and to cleanse pt.  Once rolled to each side pt able to maintain side lying position with decreased assistance to hold position; however continues to require total assistance to hold panus due  Hepatology Clinic Note  Mark Cervantes   Date of Birth 1985  Date of Service 11/17/2022    REASON FOR CONSULTATION: elevated copper  REFERRING PROVIDER: Ayde Cunningham MD          Assessment/plan:   Mark Cervantes is a 37 year old female with history of elevated copper and ceruloplasmin of unknown significance. Copper is 95% bound to ceruloplasmin. Ceruloplasmin is estrogen-sensitive, and levels are elevated in pregnancy.  Ceruloplasmin is typically low Edgardo's disease. Her liver transaminases have historically been normal.  She does not have any neurological changes, mental health concerns.  Overall, very low suspicion for Edgardo's disease.    # Do not recommend routine monitoring of copper     # Anemia, consistent with iron deficiency.  - Defer monitoring ferritin to her OB provider  - Could consider blood smear, reticulocytes, LDH, one[place] to rule out hemolysis which is associated with Edgardo's     # US abdomen in 4 months postpartum to assess for steatosis     - Follow-up in Hepatology clinic as needed    Mary Jane Dee PA-C   [facility]    Total time for E/M services performed on the date of the encounter 45 minutes.  This included review of previous: clinic visits, hospital records, lab results, imaging studies, and procedural documentation. Time also includes patient visit, documentation.  The findings from this review are summarized in the above note.   -----------------------------------------------------       HPI:   Mark Cervantes is a 37 year old female  presenting for evaluation and treatment of elevated ceruloplasmin and copper.    Appetite is normal.  She has had appropriate weight gain throughout pregnancy.     She is feeling very fatigued.  Taking her prenatal with iron.  Last IV iron infusion in September 2022.  She has regular bowel movements.     Patient denies jaundice, lower extremity edema, abdominal distension or confusion.      Patient also denies melena,  hematochezia or hematemesis.    Patient denies weight loss, fevers, sweats or chills.     PMH: Iron deficiency, gestational diabetes with previous pregnancies, history of preeclampsia    SMH: No significant surgical history    Medications:   See below    No previous tobacco use.  She does not drink alcohol no previous IV/IN drug use. Currently works as a medical technologist on the weekends. Patient currently lives with her  and 5 children, aged 2-10.  No known family history of liver disease or liver cancer.    Lab work-up thus far  HCV antibody: reactive  HCV RNA non detectable x 2  HBV SAb:  HBV SAg: Nonreactive  HBV CAb:  Ceruloplasmin 63     Medical hx Surgical hx   Past Medical History:   Diagnosis Date     Anemia      Diabetes mellitus (H)      Tuberculosis     Past Surgical History:   Procedure Laterality Date     GYN SURGERY      ovarian cyst                 Medications:     Current Outpatient Medications   Medication     amoxicillin (AMOXIL) 500 MG capsule     Prenatal Vit-Fe Fumarate-FA (PRENATAL MULTIVITAMIN W/IRON) 27-0.8 MG tablet     ferrous gluconate (FERGON) 324 (38 Fe) MG tablet     paragard intrauterine copper device     Prenat-Fe Carbonyl-FA-Omega 3 (ONE-A-DAY WOMENS PRENATAL 1 PO)     vitamin B-Complex     vitamin D3 (CHOLECALCIFEROL) 50 mcg (2000 units) tablet     No current facility-administered medications for this visit.            Allergies:   No Known Allergies         Social History:     Social History     Socioeconomic History     Marital status:      Spouse name: Delfino Mak     Number of children: Not on file     Years of education: Not on file     Highest education level: Not on file   Occupational History     Not on file   Tobacco Use     Smoking status: Never     Smokeless tobacco: Never   Substance and Sexual Activity     Alcohol use: No     Drug use: No     Sexual activity: Yes     Partners: Male   Other Topics Concern     Not on file   Social History Narrative      to position of being over EOB when side lying.  Pt appropriate for post acute therapy when medically appropriate.    Skilled therapy is required to address these limitations in attempt to maximize the patient's independence.  Progress: slow progress, medical status limitations    End of Session:   Location: in bed  Safety measures: bed rails x4, call light within reach and lines intact  Handoff to: nurse assistant  Education Provided:   Learning assessment:  - Primary learner: patient  - Are they ready to learn: yes  - Preferred learning style: verbal  - Barriers to learning: no barriers apparent at this time  Education provided during session:  - Receiving education: patient  - Results of above outlined education: Needs reinforcement    PLAN  Suggestions for next session as indicated: OT Frequency: 4 days/week, 5 days/week  Frequency Comments: XMonday (Apr18 4/4-5) co-tx or assist of 2-3    Interventions: activity tolerance training, bed mobility training and therapeutic activity  Agreement to plan and goals: patient agrees with goals and treatment plan      GOALS  Review Date: 4/25/2022  Long Term Goals: (to be met by time of discharge from hospital)  Grooming: Patient will complete grooming tasks in sitting modified independent.  Upper body dressing: Patient will complete upper body dressing in sitting modified independent.  Lower body dressing: Patient will complete lower body dressing in sitting modified independent.  Toileting: Patient will complete toileting modified independent.        Documented in the chart in the following areas: Pain. Assessment. Plan.      Therapy procedure time and total treatment time can be found documented on the Time Entry flowsheet   Not on file     Social Determinants of Health     Financial Resource Strain: Not on file   Food Insecurity: Not on file   Transportation Needs: Not on file   Physical Activity: Not on file   Stress: Not on file   Social Connections: Not on file   Intimate Partner Violence: Not on file   Housing Stability: Not on file            Family History:     Family History   Problem Relation Age of Onset     Eye Surgery No family hx of             Review of Systems:   GEN: See HPI  HEENT: No change in vision or hearing, mouth sores, dysphagia, lymph nodes  Resp: No shortness of breath, coughing, hx of asthma  CV: No chest pain, palpitations, syncope   GI: See HPI  : No dysuria, history of stones, urine color    Skin: No rash; no pruritus or psoriasis  MS: No arthralgias, myalgias, joint swelling  Neuro: No memory changes, confusion, numbness    Heme: No difficulty clotting, bruising, bleeding  Psych:  No anxiety, depression, agitation          Physical Exam:   VS:  /68   Pulse 81   Wt 94.6 kg (208 lb 8 oz)   LMP 03/19/2022 (Approximate)   SpO2 99%   BMI 33.65 kg/m        Gen: A&Ox3, NAD, well developed  HEENT: non-icteric   CV: RRR, no overt murmurs  Lung: no conversational dyspnea  Lym- no palpable lymphadenopathy  Abd: gravid abdomen   Ext: no edema, intact pulses.   Skin: No rash, no palmar erythema, telangiectasias or jaundice  Neuro: grossly intact, no asterixis   Psych: appropriate mood and affects         Data:   Reviewed in person and significant for:    Lab Results   Component Value Date     09/22/2022     03/31/2020      Lab Results   Component Value Date    POTASSIUM 3.8 09/22/2022    POTASSIUM 3.8 03/31/2020     Lab Results   Component Value Date    CHLORIDE 110 09/22/2022    CHLORIDE 107 03/31/2020     Lab Results   Component Value Date    CO2 21 09/22/2022    CO2 22 03/31/2020     Lab Results   Component Value Date    BUN 6 09/22/2022    BUN 6 03/31/2020     Lab Results   Component Value  Date    CR 0.30 09/22/2022    CR 0.34 03/31/2020       Lab Results   Component Value Date    WBC 9.5 11/08/2022    WBC 9.8 09/06/2020     Lab Results   Component Value Date    HGB 10.9 11/08/2022    HGB 9.6 09/08/2020     Lab Results   Component Value Date    HCT 33.0 11/08/2022    HCT 34.4 09/06/2020     Lab Results   Component Value Date    MCV 80 11/08/2022    MCV 78 09/06/2020     Lab Results   Component Value Date     11/08/2022     09/06/2020       Lab Results   Component Value Date    AST 15 09/22/2022    AST 20 03/31/2020     Lab Results   Component Value Date    ALT 23 09/22/2022    ALT 19 03/31/2020     No results found for: BILICONJ   Lab Results   Component Value Date    BILITOTAL 0.3 09/22/2022       Lab Results   Component Value Date    ALBUMIN 2.6 09/22/2022     Lab Results   Component Value Date    PROTTOTAL 6.7 09/22/2022      Lab Results   Component Value Date    ALKPHOS 82 09/22/2022       No results found for: INR    No recent abdominal imaging

## 2022-11-27 NOTE — PROGRESS NOTES
38w0d   Doing well recently.  No s/sx of labor.  Medication recently sent for pos Strep A test.  Completed this course of medication and asymptomatic.  Her children were tested and negative..  Cephalic by BSUS.  LATRICE 1.8 by BSUS.  Denies s/sx of ROM, but is having some vaginal irritation/itching.  Wet prep obtained with GBS.  Called [facility] who was able to add her on for formal scan.  Per Dr. Vasquez, LATRICE 1.98.  She will discuss IOL with the patient.  If patient is not agreeable, would recommend close follow-up.  GBS was collected today.  Katiana Schuler MD      ------------  Received call back from [facility].  Patient declined IOL today and reported the earliest she'll agree to IOL is Friday, 12/2.  Dr. Vasquez recommended she have NST and evaluation for ROR today, so will be sent from [facility] to L&D.  Spoke with [facility] charge RN, who is aware she'll be coming.  If she does go home undelivered, should be scheduled for IOL prior to leaving.    Katiana Schuler MD

## 2022-11-29 ENCOUNTER — PRENATAL OFFICE VISIT (OUTPATIENT)
Dept: OBGYN | Facility: CLINIC | Age: 37
End: 2022-11-29
Payer: COMMERCIAL

## 2022-11-29 ENCOUNTER — OFFICE VISIT (OUTPATIENT)
Dept: MATERNAL FETAL MEDICINE | Facility: CLINIC | Age: 37
End: 2022-11-29
Attending: OBSTETRICS & GYNECOLOGY
Payer: COMMERCIAL

## 2022-11-29 ENCOUNTER — HOSPITAL ENCOUNTER (OUTPATIENT)
Facility: CLINIC | Age: 37
Discharge: HOME | End: 2022-11-29
Attending: OBSTETRICS & GYNECOLOGY | Admitting: OBSTETRICS & GYNECOLOGY
Payer: COMMERCIAL

## 2022-11-29 ENCOUNTER — TRANSCRIBE ORDERS (OUTPATIENT)
Dept: MATERNAL FETAL MEDICINE | Facility: CLINIC | Age: 37
End: 2022-11-29

## 2022-11-29 ENCOUNTER — HOSPITAL ENCOUNTER (OUTPATIENT)
Dept: ULTRASOUND IMAGING | Facility: CLINIC | Age: 37
Discharge: HOME | End: 2022-11-29
Attending: OBSTETRICS & GYNECOLOGY
Payer: COMMERCIAL

## 2022-11-29 ENCOUNTER — HOSPITAL ENCOUNTER (OUTPATIENT)
Facility: CLINIC | Age: 37
End: 2022-11-29
Admitting: OBSTETRICS & GYNECOLOGY
Payer: COMMERCIAL

## 2022-11-29 VITALS
DIASTOLIC BLOOD PRESSURE: 66 MMHG | HEART RATE: 93 BPM | WEIGHT: 208 LBS | BODY MASS INDEX: 33.57 KG/M2 | OXYGEN SATURATION: 100 % | SYSTOLIC BLOOD PRESSURE: 106 MMHG

## 2022-11-29 VITALS — DIASTOLIC BLOOD PRESSURE: 62 MMHG | RESPIRATION RATE: 16 BRPM | SYSTOLIC BLOOD PRESSURE: 108 MMHG | TEMPERATURE: 97.9 F

## 2022-11-29 DIAGNOSIS — N89.8 VAGINAL ITCHING: ICD-10-CM

## 2022-11-29 DIAGNOSIS — D50.9 IRON DEFICIENCY ANEMIA, UNSPECIFIED IRON DEFICIENCY ANEMIA TYPE: ICD-10-CM

## 2022-11-29 DIAGNOSIS — O26.90 PREGNANCY RELATED CONDITION, ANTEPARTUM: Primary | ICD-10-CM

## 2022-11-29 DIAGNOSIS — O41.00X0 OLIGOHYDRAMNIOS, ANTEPARTUM, SINGLE OR UNSPECIFIED FETUS: Primary | ICD-10-CM

## 2022-11-29 DIAGNOSIS — J02.0 STREPTOCOCCAL SORE THROAT: ICD-10-CM

## 2022-11-29 DIAGNOSIS — O26.90 PREGNANCY RELATED CONDITION, ANTEPARTUM: ICD-10-CM

## 2022-11-29 DIAGNOSIS — R93.89 ABNORMAL FINDING ON ULTRASOUND: ICD-10-CM

## 2022-11-29 DIAGNOSIS — O09.523 MULTIGRAVIDA OF ADVANCED MATERNAL AGE IN THIRD TRIMESTER: Primary | ICD-10-CM

## 2022-11-29 LAB
CLUE CELLS: ABNORMAL
CRYSTALS AMN MICRO: NORMAL
RUPTURE OF FETAL MEMBRANES BY ROM PLUS: NEGATIVE
TRICHOMONAS, WET PREP: ABNORMAL
WBC'S/HIGH POWER FIELD, WET PREP: ABNORMAL
YEAST, WET PREP: ABNORMAL

## 2022-11-29 PROCEDURE — G0463 HOSPITAL OUTPT CLINIC VISIT: HCPCS

## 2022-11-29 PROCEDURE — 84112 EVAL AMNIOTIC FLUID PROTEIN: CPT | Performed by: OBSTETRICS & GYNECOLOGY

## 2022-11-29 PROCEDURE — 87210 SMEAR WET MOUNT SALINE/INK: CPT | Performed by: OBSTETRICS & GYNECOLOGY

## 2022-11-29 PROCEDURE — 99212 OFFICE O/P EST SF 10 MIN: CPT | Mod: 25 | Performed by: OBSTETRICS & GYNECOLOGY

## 2022-11-29 PROCEDURE — 87653 STREP B DNA AMP PROBE: CPT | Performed by: OBSTETRICS & GYNECOLOGY

## 2022-11-29 PROCEDURE — 76815 OB US LIMITED FETUS(S): CPT

## 2022-11-29 PROCEDURE — 76815 OB US LIMITED FETUS(S): CPT | Mod: 26 | Performed by: OBSTETRICS & GYNECOLOGY

## 2022-11-29 PROCEDURE — 99212 OFFICE O/P EST SF 10 MIN: CPT | Performed by: OBSTETRICS & GYNECOLOGY

## 2022-11-29 RX ORDER — LIDOCAINE 40 MG/G
CREAM TOPICAL
Status: DISCONTINUED | OUTPATIENT
Start: 2022-11-29 | End: 2022-11-29 | Stop reason: HOSPADM

## 2022-11-29 ASSESSMENT — ACTIVITIES OF DAILY LIVING (ADL): ADLS_ACUITY_SCORE: 33

## 2022-11-29 NOTE — PROVIDER NOTIFICATION
22 1323   Provider Notification   Provider Name/Title Dr Pantoja   Method of Notification In Department   Request Evaluate in Person   Notification Reason Patient Arrived   Patient here from [place] [facility] for r/o SROM and low LATRICE. . 38w0d. ROM+ sent.

## 2022-11-29 NOTE — PROGRESS NOTES
The patient was seen for an ultrasound in the [facility] at the [facility] today.  For a detailed report of the ultrasound examination, please see the ultrasound report which can be found under the imaging tab.    Lilian Vasquez MD  , OB/GYN  Maternal-Fetal Medicine  [phone] (Pager)

## 2022-11-29 NOTE — DISCHARGE INSTRUCTIONS
Discharge Instruction for Undelivered Patients      You were seen for: Fetal Assessment  We Consulted: [facility]  You had (Test or Medicine):Labs     Diet:   You may eat meals and snacks.     Activity:  Call your doctor or nurse midwife if your baby is moving less than usual.     Call your provider if you notice:  Swelling in your face or increased swelling in your hands or legs.  Headaches that are not relieved by Tylenol (acetaminophen).  Changes in your vision (blurring: seeing spots or stars.)  Nausea (sick to your stomach) and vomiting (throwing up).   Weight gain of 5 pounds or more per week.  Heartburn that doesn't go away.  Signs of bladder infection: pain when you urinate (use the toilet), need to go more often and more urgently.  The bag of quinones (rupture of membranes) breaks, or you notice leaking in your underwear.  Bright red blood in your underwear.  Abdominal (lower belly) or stomach pain.  For first baby: Contractions (tightening) less than 5 minutes apart for one hour or more.  Second (plus) baby: Contractions (tightening) less than 10 minutes apart and getting stronger.  *If less than 34 weeks: Contractions (tightening) more than 6 times in one hour.  Increase or change in vaginal discharge (note the color and amount)      Follow-up:  As scheduled in the clinic

## 2022-11-29 NOTE — H&P
Ob/Gyn History and Physical   2022  Mark Cervantes  [id number]      HPI: Mark Cervantes is a 37 year old  at 38w0d by 22w6d US who presents with low LATRICE here to r/o ROM.    Patient presented to [facility] for scheduled follow up, was found to have LATRICE of 1.8 by BSUS. She was then sent to [facility]. LATRICE there was 1.98. Evaluation for ROM, and induction was recommended.    She now presents to L&D. She is feeling well today. She has not had any leaking fluid or gushes of fluid. Denies contractions, vaginal bleeding. She is feeling normal fetal movement. Reports having strep and Influenza A about a month ago but has felt well since then.  She denies headache, vision changes, chest pain, shortness of breath, fever, chills, nausea, vomiting or other systemic complaints.    Her pregnancy has been complicated by:  - hep C antibody positive, RNA negative  -History of GDM  -New diagnosis of oligohydramnios    ROS: No headaches, vision changes, nausea, vomiting, fevers, chills, chest pain, SOB, abdominal pain, constipation, diarrhea, dysuria, changes in vaginal discharge or edema in extremities noted.     OBHX:   OB History    Para Term  AB Living   6 5 5 0 0 5   SAB IAB Ectopic Multiple Live Births   0 0 0 0 5      # Outcome Date GA Lbr Bryce/2nd Weight Sex Delivery Anes PTL Lv   6 Current            5 Term 20 40w1d 07:55 / 00:13 4.252 kg (9 lb 6 oz) F Vag-Spont EPI  JIM      Name: MILI,FEMALE-MARK      Apgar1: 9  Apgar5: 9   4 Term 18 41w1d 02:00 / 00:19 3.78 kg (8 lb 5.3 oz) M Vag-Spont EPI N JIM      Name: MILI,BABY1 REANNAKYMBERLY      Apgar1: 9  Apgar5: 9   3 Term 09/25/15 39w3d 03:30 / 00:11 3.74 kg (8 lb 3.9 oz) F Vag-Spont EPI  JIM      Apgar1: 6  Apgar5: 9   2 Term 10/23/13 39w4d 05:40 / 00:38 3.465 kg (7 lb 10.2 oz) F Vag-Spont EPI  JIM      Name: MILI,ROSENDO JEWELL I      Apgar1: 9  Apgar5: 9   1 Term 12 39w6d 15:00 / 04:33 3.459 kg (7 lb 10 oz) F Vag-Vacuum  EPI, Local N JIM      Name: ROSENDO GARCES      Apgar1: 8  Apgar5: 9      Obstetric Comments   Hx GDM with all pregnancies.  HTN after delivery in 2018-required medication.  No PPH.        Physical Exam:  Vitals:    22 1323   BP: 108/62   BP Location: Left arm   Resp: 16   Temp: 97.9  F (36.6  C)   TempSrc: Oral     General: alert, oriented female, resting in bed in NAD  CV: regular rate and rhythm.  Lungs: Breathing comfortably on RA.  Abdomen: soft, gravid, non-tender.  Extremities: bilateral lower extremities non-tender without edema.  SSE: Normal vulva and vagina, no pooling or fluid. No blood, normal discharge. Cervix appeared digitally 0-1 cm  SVE: Fingertip/long/high    Presentation: Cephalic by BSUS     NST:  FHT: baseline 150-160, moderate variability, accelerations present, no decelerations  [place]: No contractions    Assessment/Plan: 37 year old  at 38w0d by 22w6d US who presents with low LATRICE here to r/o ROM.    Rule out ROM  Oligohydramnios  Patient with LATRICE of 1.98 in MFM clinic today. Here, patient denies any symptoms of ROM. Negative pooling, ferning, ROM plus negative. Based on this evaluation, do not suspect rupture membranes. We then discussed other etiologies of oligohydramnios and recommendations as she is now term. Discussed again the [facility] recommendation for induction for oligohydramnios. She again declines as she does not feel like her body is ready for labor. Induction of labor scheduled for Thursday 12/ PM per patient request. Discussed the risks of discharging at this time including risk of fetal distress, stillbirth, cord accident. Discussed that she can return at any time for induction if she changes her mind. Discussed strict return precautions    Fetal well-being  - Category 1 FHT, reactive and reassuring, some periods that appear to be tachycardia, but seems most consistent with multiple accelerations.  - continuous fetal monitoring  - intrauterine resuscitation  PRN    Plan to discharge with IOL scheduled for Thursday 12/01    Patient discussed with Dr. Tidwell.    Avi Pantoja MD  OB/GYN PGY-3  11/29/2022 7:15 PM    Physician Attestation   I did not see the patient on this date as I was occupied by other clinical responsibilities.   NST reactive.     Gricelda Tidwell MD

## 2022-11-29 NOTE — PROVIDER NOTIFICATION
11/29/22 1500   Provider Notification   Provider Name/Title Dr Pantoja   Method of Notification At Bedside   Notification Reason Fetal Baseline Change;Status Update;Patient Request   Dr Pantoja at bedside to review FHT and discuss with patient recommended plan and risks, see provider's notes. Patient requesting to be discharged at this time. Dr Pantoja okayed for patient to come off monitor and discharge to home.

## 2022-11-29 NOTE — PLAN OF CARE
Data: [facility] at [facility]6.   Reason for maternal/fetal assessment per patient is No chief complaint on file.  . Patient is a . Prenatal record reviewed.      OB History    Para Term  AB Living   6 5 5 0 0 5   SAB IAB Ectopic Multiple Live Births   0 0 0 0 5      # Outcome Date GA Lbr Bryce/2nd Weight Sex Delivery Anes PTL Lv   6 Current            5 Term 20 40w1d 07:55 / 00:13 4.252 kg (9 lb 6 oz) F Vag-Spont EPI  JIM      Name: MILIFEMALE-HAMDI      Apgar1: 9  Apgar5: 9   4 Term 18 41w1d 02:00 / 00:19 3.78 kg (8 lb 5.3 oz) M Vag-Spont EPI N JIM      Name: AMANDA GARCES1 HAMDI      Apgar1: 9  Apgar5: 9   3 Term 09/25/15 39w3d 03:30 / 00:11 3.74 kg (8 lb 3.9 oz) F Vag-Spont EPI  JIM      Apgar1: 6  Apgar5: 9   2 Term 10/23/13 39w4d 05:40 / 00:38 3.465 kg (7 lb 10.2 oz) F Vag-Spont EPI  JIM      Name: ROSENDO GARCES HAMDI I      Apgar1: 9  Apgar5: 9   1 Term 12 39w6d 15:00 / 04:33 3.459 kg (7 lb 10 oz) F Vag-Vacuum EPI, Local N JIM      Name: ROSENDO GARCES HAMDI      Apgar1: 8  Apgar5: 9      Obstetric Comments   Hx GDM with all pregnancies.  HTN after delivery in 2018-required medication.  No PPH.       Medical History:   Past Medical History:   Diagnosis Date    Anemia     Diabetes mellitus (H)     Gestational diabetes diet controlled    Tuberculosis     skin test + but chest xray ok   . Gestational Age 38w0d. VSS. Cervix: posterior and closed.  Fetal movement present. Patient denies cramping, backache, vaginal discharge, pelvic pressure, UTI symptoms, GI problems, bloody show, vaginal bleeding, edema, headache, visual disturbances, epigastric or URQ pain, abdominal pain, rupture of membranes. Support persons not present.  Action: Verbal consent for EFM. Triage assessment completed. EFM applied for fetal well-being. Uterine assessment shows infrequent contractions. Fetal assessment: Presumed adequate fetal oxygenation documented (see flow record). Patient  education given on discharge instructions and follow plan for induction on Thursday. Patient instructed to report change in fetal movement, vaginal leaking of fluid or bleeding, abdominal pain, or any concerns related to the pregnancy to her nurse/physician.   Response: Dr. Pantoja informed of patient arrival. Patient assessed by provider, plan is for patient to discharge and have induction on Thursday. See provider note.  Patient verbalized understanding of education and verbalized agreement with plan. Discharged ambulatory at 1515.        Plan of Care Reviewed With: patient

## 2022-11-30 LAB — GP B STREP DNA SPEC QL NAA+PROBE: NEGATIVE

## 2022-12-01 ENCOUNTER — HOSPITAL ENCOUNTER (INPATIENT)
Facility: CLINIC | Age: 37
LOS: 3 days | Discharge: HOME | End: 2022-12-04
Attending: OBSTETRICS & GYNECOLOGY | Admitting: OBSTETRICS & GYNECOLOGY
Payer: COMMERCIAL

## 2022-12-01 DIAGNOSIS — O92.70 LACTATION DISORDER: Primary | ICD-10-CM

## 2022-12-01 PROBLEM — Z34.90 ENCOUNTER FOR INDUCTION OF LABOR: Status: ACTIVE | Noted: 2022-12-01

## 2022-12-01 LAB
ABO/RH(D): NORMAL
ANTIBODY SCREEN: NEGATIVE
HGB BLD-MCNC: 11.5 G/DL (ref 11.7–15.7)
PLATELET # BLD AUTO: 217 10E3/UL (ref 150–450)
SARS-COV-2 RNA RESP QL NAA+PROBE: NEGATIVE
SPECIMEN EXPIRATION DATE: NORMAL

## 2022-12-01 PROCEDURE — 85049 AUTOMATED PLATELET COUNT: CPT

## 2022-12-01 PROCEDURE — 99221 1ST HOSP IP/OBS SF/LOW 40: CPT | Mod: 25 | Performed by: OBSTETRICS & GYNECOLOGY

## 2022-12-01 PROCEDURE — 85018 HEMOGLOBIN: CPT

## 2022-12-01 PROCEDURE — U0003 INFECTIOUS AGENT DETECTION BY NUCLEIC ACID (DNA OR RNA); SEVERE ACUTE RESPIRATORY SYNDROME CORONAVIRUS 2 (SARS-COV-2) (CORONAVIRUS DISEASE [COVID-19]), AMPLIFIED PROBE TECHNIQUE, MAKING USE OF HIGH THROUGHPUT TECHNOLOGIES AS DESCRIBED BY CMS-2020-01-R: HCPCS

## 2022-12-01 PROCEDURE — 120N000002 HC R&B MED SURG/OB UMMC

## 2022-12-01 PROCEDURE — 86780 TREPONEMA PALLIDUM: CPT

## 2022-12-01 PROCEDURE — 59025 FETAL NON-STRESS TEST: CPT | Mod: 26 | Performed by: OBSTETRICS & GYNECOLOGY

## 2022-12-01 PROCEDURE — 36415 COLL VENOUS BLD VENIPUNCTURE: CPT

## 2022-12-01 PROCEDURE — 86850 RBC ANTIBODY SCREEN: CPT

## 2022-12-01 PROCEDURE — 86901 BLOOD TYPING SEROLOGIC RH(D): CPT

## 2022-12-01 PROCEDURE — 250N000013 HC RX MED GY IP 250 OP 250 PS 637

## 2022-12-01 RX ORDER — LIDOCAINE HYDROCHLORIDE 10 MG/ML
INJECTION, SOLUTION EPIDURAL; INFILTRATION; INTRACAUDAL; PERINEURAL
Status: DISCONTINUED
Start: 2022-12-01 | End: 2022-12-02 | Stop reason: HOSPADM

## 2022-12-01 RX ORDER — MISOPROSTOL 200 UG/1
TABLET ORAL
Status: COMPLETED
Start: 2022-12-01 | End: 2022-12-02

## 2022-12-01 RX ORDER — SODIUM CHLORIDE, SODIUM LACTATE, POTASSIUM CHLORIDE, CALCIUM CHLORIDE 600; 310; 30; 20 MG/100ML; MG/100ML; MG/100ML; MG/100ML
INJECTION, SOLUTION INTRAVENOUS CONTINUOUS PRN
Status: DISCONTINUED | OUTPATIENT
Start: 2022-12-01 | End: 2022-12-02

## 2022-12-01 RX ORDER — KETOROLAC TROMETHAMINE 30 MG/ML
30 INJECTION, SOLUTION INTRAMUSCULAR; INTRAVENOUS
Status: DISCONTINUED | OUTPATIENT
Start: 2022-12-01 | End: 2022-12-04

## 2022-12-01 RX ORDER — NALOXONE HYDROCHLORIDE 0.4 MG/ML
0.4 INJECTION, SOLUTION INTRAMUSCULAR; INTRAVENOUS; SUBCUTANEOUS
Status: DISCONTINUED | OUTPATIENT
Start: 2022-12-01 | End: 2022-12-03 | Stop reason: HOSPADM

## 2022-12-01 RX ORDER — MISOPROSTOL 100 UG/1
25 TABLET ORAL
Status: DISCONTINUED | OUTPATIENT
Start: 2022-12-01 | End: 2022-12-02

## 2022-12-01 RX ORDER — ONDANSETRON 4 MG/1
4 TABLET, ORALLY DISINTEGRATING ORAL EVERY 6 HOURS PRN
Status: DISCONTINUED | OUTPATIENT
Start: 2022-12-01 | End: 2022-12-03 | Stop reason: HOSPADM

## 2022-12-01 RX ORDER — MISOPROSTOL 200 UG/1
800 TABLET ORAL
Status: DISCONTINUED | OUTPATIENT
Start: 2022-12-01 | End: 2022-12-03 | Stop reason: HOSPADM

## 2022-12-01 RX ORDER — NALOXONE HYDROCHLORIDE 0.4 MG/ML
0.2 INJECTION, SOLUTION INTRAMUSCULAR; INTRAVENOUS; SUBCUTANEOUS
Status: DISCONTINUED | OUTPATIENT
Start: 2022-12-01 | End: 2022-12-03 | Stop reason: HOSPADM

## 2022-12-01 RX ORDER — MISOPROSTOL 200 UG/1
400 TABLET ORAL
Status: DISCONTINUED | OUTPATIENT
Start: 2022-12-01 | End: 2022-12-03 | Stop reason: HOSPADM

## 2022-12-01 RX ORDER — CITRIC ACID/SODIUM CITRATE 334-500MG
30 SOLUTION, ORAL ORAL
Status: DISCONTINUED | OUTPATIENT
Start: 2022-12-01 | End: 2022-12-03 | Stop reason: HOSPADM

## 2022-12-01 RX ORDER — PROCHLORPERAZINE MALEATE 10 MG
10 TABLET ORAL EVERY 6 HOURS PRN
Status: DISCONTINUED | OUTPATIENT
Start: 2022-12-01 | End: 2022-12-03 | Stop reason: HOSPADM

## 2022-12-01 RX ORDER — METOCLOPRAMIDE HYDROCHLORIDE 5 MG/ML
10 INJECTION INTRAMUSCULAR; INTRAVENOUS EVERY 6 HOURS PRN
Status: DISCONTINUED | OUTPATIENT
Start: 2022-12-01 | End: 2022-12-03 | Stop reason: HOSPADM

## 2022-12-01 RX ORDER — IBUPROFEN 800 MG/1
800 TABLET, FILM COATED ORAL
Status: DISCONTINUED | OUTPATIENT
Start: 2022-12-01 | End: 2022-12-04

## 2022-12-01 RX ORDER — TRANEXAMIC ACID 10 MG/ML
1 INJECTION, SOLUTION INTRAVENOUS EVERY 30 MIN PRN
Status: DISCONTINUED | OUTPATIENT
Start: 2022-12-01 | End: 2022-12-03 | Stop reason: HOSPADM

## 2022-12-01 RX ORDER — OXYTOCIN 10 [USP'U]/ML
10 INJECTION, SOLUTION INTRAMUSCULAR; INTRAVENOUS
Status: DISCONTINUED | OUTPATIENT
Start: 2022-12-01 | End: 2022-12-03 | Stop reason: HOSPADM

## 2022-12-01 RX ORDER — METHYLERGONOVINE MALEATE 0.2 MG/ML
200 INJECTION INTRAVENOUS
Status: DISCONTINUED | OUTPATIENT
Start: 2022-12-01 | End: 2022-12-03 | Stop reason: HOSPADM

## 2022-12-01 RX ORDER — OXYTOCIN 10 [USP'U]/ML
10 INJECTION, SOLUTION INTRAMUSCULAR; INTRAVENOUS
Status: DISCONTINUED | OUTPATIENT
Start: 2022-12-01 | End: 2022-12-04 | Stop reason: HOSPADM

## 2022-12-01 RX ORDER — OXYTOCIN/0.9 % SODIUM CHLORIDE 30/500 ML
340 PLASTIC BAG, INJECTION (ML) INTRAVENOUS CONTINUOUS PRN
Status: DISCONTINUED | OUTPATIENT
Start: 2022-12-01 | End: 2022-12-03 | Stop reason: HOSPADM

## 2022-12-01 RX ORDER — OXYTOCIN 10 [USP'U]/ML
INJECTION, SOLUTION INTRAMUSCULAR; INTRAVENOUS
Status: DISCONTINUED
Start: 2022-12-01 | End: 2022-12-02 | Stop reason: HOSPADM

## 2022-12-01 RX ORDER — LIDOCAINE 40 MG/G
CREAM TOPICAL
Status: DISCONTINUED | OUTPATIENT
Start: 2022-12-01 | End: 2022-12-03 | Stop reason: HOSPADM

## 2022-12-01 RX ORDER — OXYTOCIN/0.9 % SODIUM CHLORIDE 30/500 ML
100-340 PLASTIC BAG, INJECTION (ML) INTRAVENOUS CONTINUOUS PRN
Status: DISCONTINUED | OUTPATIENT
Start: 2022-12-01 | End: 2022-12-04 | Stop reason: HOSPADM

## 2022-12-01 RX ORDER — PROCHLORPERAZINE 25 MG
25 SUPPOSITORY, RECTAL RECTAL EVERY 12 HOURS PRN
Status: DISCONTINUED | OUTPATIENT
Start: 2022-12-01 | End: 2022-12-03 | Stop reason: HOSPADM

## 2022-12-01 RX ORDER — OXYTOCIN/0.9 % SODIUM CHLORIDE 30/500 ML
PLASTIC BAG, INJECTION (ML) INTRAVENOUS
Status: COMPLETED
Start: 2022-12-01 | End: 2022-12-02

## 2022-12-01 RX ORDER — ONDANSETRON 2 MG/ML
4 INJECTION INTRAMUSCULAR; INTRAVENOUS EVERY 6 HOURS PRN
Status: DISCONTINUED | OUTPATIENT
Start: 2022-12-01 | End: 2022-12-03 | Stop reason: HOSPADM

## 2022-12-01 RX ORDER — LIDOCAINE 40 MG/G
CREAM TOPICAL
Status: DISCONTINUED | OUTPATIENT
Start: 2022-12-01 | End: 2022-12-02

## 2022-12-01 RX ORDER — METOCLOPRAMIDE 10 MG/1
10 TABLET ORAL EVERY 6 HOURS PRN
Status: DISCONTINUED | OUTPATIENT
Start: 2022-12-01 | End: 2022-12-03 | Stop reason: HOSPADM

## 2022-12-01 RX ORDER — TERBUTALINE SULFATE 1 MG/ML
0.25 INJECTION, SOLUTION SUBCUTANEOUS
Status: DISCONTINUED | OUTPATIENT
Start: 2022-12-01 | End: 2022-12-03 | Stop reason: HOSPADM

## 2022-12-01 RX ORDER — CARBOPROST TROMETHAMINE 250 UG/ML
250 INJECTION, SOLUTION INTRAMUSCULAR
Status: DISCONTINUED | OUTPATIENT
Start: 2022-12-01 | End: 2022-12-03 | Stop reason: HOSPADM

## 2022-12-01 RX ORDER — FENTANYL CITRATE 50 UG/ML
100 INJECTION, SOLUTION INTRAMUSCULAR; INTRAVENOUS
Status: DISCONTINUED | OUTPATIENT
Start: 2022-12-01 | End: 2022-12-03 | Stop reason: HOSPADM

## 2022-12-01 RX ADMIN — MISOPROSTOL 25 MCG: 100 TABLET ORAL at 22:16

## 2022-12-01 ASSESSMENT — ACTIVITIES OF DAILY LIVING (ADL)
ADLS_ACUITY_SCORE: 22
ADLS_ACUITY_SCORE: 22

## 2022-12-02 ENCOUNTER — ANESTHESIA EVENT (OUTPATIENT)
Dept: OBGYN | Facility: CLINIC | Age: 37
End: 2022-12-02
Payer: COMMERCIAL

## 2022-12-02 ENCOUNTER — ANESTHESIA (OUTPATIENT)
Dept: OBGYN | Facility: CLINIC | Age: 37
End: 2022-12-02
Payer: COMMERCIAL

## 2022-12-02 LAB — T PALLIDUM AB SER QL: NONREACTIVE

## 2022-12-02 PROCEDURE — 00HU33Z INSERTION OF INFUSION DEVICE INTO SPINAL CANAL, PERCUTANEOUS APPROACH: ICD-10-PCS | Performed by: ANESTHESIOLOGY

## 2022-12-02 PROCEDURE — 10907ZC DRAINAGE OF AMNIOTIC FLUID, THERAPEUTIC FROM PRODUCTS OF CONCEPTION, VIA NATURAL OR ARTIFICIAL OPENING: ICD-10-PCS | Performed by: OBSTETRICS & GYNECOLOGY

## 2022-12-02 PROCEDURE — 370N000003 HC ANESTHESIA WARD SERVICE

## 2022-12-02 PROCEDURE — 250N000009 HC RX 250

## 2022-12-02 PROCEDURE — 250N000009 HC RX 250: Performed by: ANESTHESIOLOGY

## 2022-12-02 PROCEDURE — 120N000002 HC R&B MED SURG/OB UMMC

## 2022-12-02 PROCEDURE — 250N000011 HC RX IP 250 OP 636

## 2022-12-02 PROCEDURE — 258N000003 HC RX IP 258 OP 636

## 2022-12-02 PROCEDURE — 250N000013 HC RX MED GY IP 250 OP 250 PS 637

## 2022-12-02 PROCEDURE — 250N000011 HC RX IP 250 OP 636: Performed by: ANESTHESIOLOGY

## 2022-12-02 PROCEDURE — 3E0R3BZ INTRODUCTION OF ANESTHETIC AGENT INTO SPINAL CANAL, PERCUTANEOUS APPROACH: ICD-10-PCS | Performed by: ANESTHESIOLOGY

## 2022-12-02 RX ORDER — FENTANYL CITRATE-0.9 % NACL/PF 10 MCG/ML
100 PLASTIC BAG, INJECTION (ML) INTRAVENOUS EVERY 5 MIN PRN
Status: DISCONTINUED | OUTPATIENT
Start: 2022-12-02 | End: 2022-12-03 | Stop reason: HOSPADM

## 2022-12-02 RX ORDER — OXYTOCIN/0.9 % SODIUM CHLORIDE 30/500 ML
1-24 PLASTIC BAG, INJECTION (ML) INTRAVENOUS CONTINUOUS
Status: DISCONTINUED | OUTPATIENT
Start: 2022-12-02 | End: 2022-12-03 | Stop reason: HOSPADM

## 2022-12-02 RX ORDER — LIDOCAINE HYDROCHLORIDE AND EPINEPHRINE 15; 5 MG/ML; UG/ML
INJECTION, SOLUTION EPIDURAL PRN
Status: DISCONTINUED | OUTPATIENT
Start: 2022-12-02 | End: 2022-12-03

## 2022-12-02 RX ORDER — NALBUPHINE HYDROCHLORIDE 10 MG/ML
2.5-5 INJECTION, SOLUTION INTRAMUSCULAR; INTRAVENOUS; SUBCUTANEOUS EVERY 6 HOURS PRN
Status: DISCONTINUED | OUTPATIENT
Start: 2022-12-02 | End: 2022-12-02

## 2022-12-02 RX ORDER — SODIUM CHLORIDE, SODIUM LACTATE, POTASSIUM CHLORIDE, CALCIUM CHLORIDE 600; 310; 30; 20 MG/100ML; MG/100ML; MG/100ML; MG/100ML
INJECTION, SOLUTION INTRAVENOUS
Status: COMPLETED
Start: 2022-12-02 | End: 2022-12-02

## 2022-12-02 RX ORDER — SODIUM CHLORIDE, SODIUM LACTATE, POTASSIUM CHLORIDE, CALCIUM CHLORIDE 600; 310; 30; 20 MG/100ML; MG/100ML; MG/100ML; MG/100ML
INJECTION, SOLUTION INTRAVENOUS
Status: COMPLETED
Start: 2022-12-02 | End: 2022-12-03

## 2022-12-02 RX ORDER — LIDOCAINE 40 MG/G
CREAM TOPICAL
Status: DISCONTINUED | OUTPATIENT
Start: 2022-12-02 | End: 2022-12-02

## 2022-12-02 RX ORDER — FENTANYL CITRATE 50 UG/ML
INJECTION, SOLUTION INTRAMUSCULAR; INTRAVENOUS
Status: DISCONTINUED
Start: 2022-12-02 | End: 2022-12-03 | Stop reason: HOSPADM

## 2022-12-02 RX ORDER — SODIUM CHLORIDE, SODIUM LACTATE, POTASSIUM CHLORIDE, CALCIUM CHLORIDE 600; 310; 30; 20 MG/100ML; MG/100ML; MG/100ML; MG/100ML
INJECTION, SOLUTION INTRAVENOUS CONTINUOUS PRN
Status: DISCONTINUED | OUTPATIENT
Start: 2022-12-02 | End: 2022-12-03 | Stop reason: HOSPADM

## 2022-12-02 RX ORDER — FENTANYL/ROPIVACAINE/NS/PF 2MCG/ML-.1
PLASTIC BAG, INJECTION (ML) EPIDURAL CONTINUOUS PRN
Status: DISCONTINUED | OUTPATIENT
Start: 2022-12-02 | End: 2022-12-03

## 2022-12-02 RX ORDER — FENTANYL/ROPIVACAINE/NS/PF 2MCG/ML-.1
PLASTIC BAG, INJECTION (ML) EPIDURAL
Status: DISCONTINUED | OUTPATIENT
Start: 2022-12-02 | End: 2022-12-03 | Stop reason: HOSPADM

## 2022-12-02 RX ORDER — NALBUPHINE HYDROCHLORIDE 10 MG/ML
2.5-5 INJECTION, SOLUTION INTRAMUSCULAR; INTRAVENOUS; SUBCUTANEOUS EVERY 6 HOURS PRN
Status: DISCONTINUED | OUTPATIENT
Start: 2022-12-02 | End: 2022-12-04 | Stop reason: HOSPADM

## 2022-12-02 RX ORDER — FENTANYL CITRATE-0.9 % NACL/PF 10 MCG/ML
100 PLASTIC BAG, INJECTION (ML) INTRAVENOUS EVERY 5 MIN PRN
Status: DISCONTINUED | OUTPATIENT
Start: 2022-12-02 | End: 2022-12-02

## 2022-12-02 RX ADMIN — Medication 6 ML/HR: at 17:47

## 2022-12-02 RX ADMIN — MISOPROSTOL 25 MCG: 100 TABLET ORAL at 00:23

## 2022-12-02 RX ADMIN — SODIUM CHLORIDE, POTASSIUM CHLORIDE, SODIUM LACTATE AND CALCIUM CHLORIDE 1000 ML: 600; 310; 30; 20 INJECTION, SOLUTION INTRAVENOUS at 09:31

## 2022-12-02 RX ADMIN — Medication 2 MILLI-UNITS/MIN: at 11:04

## 2022-12-02 RX ADMIN — SODIUM CHLORIDE, POTASSIUM CHLORIDE, SODIUM LACTATE AND CALCIUM CHLORIDE: 600; 310; 30; 20 INJECTION, SOLUTION INTRAVENOUS at 15:41

## 2022-12-02 RX ADMIN — MISOPROSTOL 25 MCG: 100 TABLET ORAL at 09:15

## 2022-12-02 RX ADMIN — MISOPROSTOL 25 MCG: 100 TABLET ORAL at 02:46

## 2022-12-02 RX ADMIN — SODIUM CHLORIDE, SODIUM LACTATE, POTASSIUM CHLORIDE, CALCIUM CHLORIDE: 600; 310; 30; 20 INJECTION, SOLUTION INTRAVENOUS at 15:41

## 2022-12-02 RX ADMIN — MISOPROSTOL 25 MCG: 100 TABLET ORAL at 06:39

## 2022-12-02 RX ADMIN — Medication: at 17:27

## 2022-12-02 RX ADMIN — LIDOCAINE HYDROCHLORIDE,EPINEPHRINE BITARTRATE 3 ML: 15; .005 INJECTION, SOLUTION EPIDURAL; INFILTRATION; INTRACAUDAL; PERINEURAL at 17:33

## 2022-12-02 RX ADMIN — Medication 12 MILLI-UNITS/MIN: at 18:55

## 2022-12-02 ASSESSMENT — ACTIVITIES OF DAILY LIVING (ADL)
ADLS_ACUITY_SCORE: 22

## 2022-12-02 NOTE — ANESTHESIA PREPROCEDURE EVALUATION
Anesthesia Pre-Procedure Evaluation    Patient: Mark Cervantes   MRN: [id number] : 1985        Procedure :      Epidural catheter placement for labor pain      Past Medical History:   Diagnosis Date     Anemia      Diabetes mellitus (H)     Gestational diabetes diet controlled     Tuberculosis     skin test + but chest xray ok      Past Surgical History:   Procedure Laterality Date     GYN SURGERY      ovarian cyst      No Known Allergies   Social History     Tobacco Use     Smoking status: Never     Smokeless tobacco: Never   Substance Use Topics     Alcohol use: No      Wt Readings from Last 1 Encounters:   22 94.3 kg (208 lb)        Anesthesia Evaluation   Pt has had prior anesthetic.     No history of anesthetic complications       ROS/MED HX  ENT/Pulmonary: Comment: Possible latent TB, positive skin test but negative XR. Unclear vaccination history.  - neg pulmonary ROS     Neurologic:  - neg neurologic ROS     Cardiovascular: Comment: Chronic hypertension - neg cardiovascular ROS     METS/Exercise Tolerance:     Hematologic:     (+) no thrombocytopenia (plts 217 on 22), anemia (Hgb 11.5),     Musculoskeletal:       GI/Hepatic:  - neg GI/hepatic ROS     Renal/Genitourinary:  - neg Renal ROS     Endo:  - neg endo ROS   (+) gestational diabetes and Diet- controlled, (-) Type I DM and Type II DM   Psychiatric/Substance Use:  - neg psychiatric ROS     Infectious Disease:  - neg infectious disease ROS     Malignancy:  - neg malignancy ROS     Other:    at 38w3d here for induction of labor due to oligohydramnios.   (-) previous  and TOLAC candidate       Physical Exam    Airway        Mallampati: II   TM distance: > 3 FB   Neck ROM: full   Mouth opening: > 3 cm    Respiratory Devices and Support         Dental  no notable dental history         Cardiovascular   cardiovascular exam normal          Pulmonary   pulmonary exam normal                OUTSIDE LABS:  CBC:   Lab Results    Component Value Date    WBC 9.5 11/08/2022    WBC 10.9 09/22/2022    HGB 11.5 (L) 12/01/2022    HGB 10.9 (L) 11/08/2022    HCT 33.0 (L) 11/08/2022    HCT 31.9 (L) 09/22/2022     12/01/2022     11/08/2022     BMP:   Lab Results   Component Value Date     09/22/2022     03/31/2020    POTASSIUM 3.8 09/22/2022    POTASSIUM 3.8 03/31/2020    CHLORIDE 110 (H) 09/22/2022    CHLORIDE 107 03/31/2020    CO2 21 09/22/2022    CO2 22 03/31/2020    BUN 6 (L) 09/22/2022    BUN 6 (L) 03/31/2020    CR 0.30 (L) 09/22/2022    CR 0.34 (L) 03/31/2020    GLC 83 09/22/2022    GLC 90 03/31/2020     COAGS: No results found for: PTT, INR, FIBR  POC:   Lab Results   Component Value Date     (H) 09/08/2020    HCG Negative 11/16/2020     HEPATIC:   Lab Results   Component Value Date    ALBUMIN 2.6 (L) 09/22/2022    PROTTOTAL 6.7 (L) 09/22/2022    ALT 23 09/22/2022    AST 15 09/22/2022    ALKPHOS 82 09/22/2022    BILITOTAL 0.3 09/22/2022     OTHER:   Lab Results   Component Value Date    A1C 5.4 03/14/2022    SHERIDAN 8.4 (L) 09/22/2022    TSH 0.87 03/14/2022    T4 0.97 03/14/2022       Anesthesia Plan    ASA Status:  3   NPO Status:  NPO Appropriate    Anesthesia Type: Epidural.              Consents    Anesthesia Plan(s) and associated risks, benefits, and realistic alternatives discussed. Questions answered and patient/representative(s) expressed understanding.     - Discussed: Risks, Benefits and Alternatives for the PROCEDURE were discussed     - Discussed with:  Patient      - Extended Intubation/Ventilatory Support Discussed: No.      - Patient is DNR/DNI Status: No    Use of blood products discussed: No .     Postoperative Care    Pain management: Neuraxial analgesia.        Comments:    Other Comments: Epidural with std monitors            Jonnie Shore

## 2022-12-02 NOTE — PROGRESS NOTES
[facility]  Labor Progress Note - FHT Strip Review    S:   Patient reports feeling well, is having some tightening. Amenable to davis placement    O:   Patient Vitals for the past 4 hrs:   BP Temp Temp src Resp   22 0443 100/58 98.5  F (36.9  C) Oral 16     Gen: NAD, sleeping upon arrival  SVE: 1.5/50/-3, davis with 60 mL    FHT  Baseline: 140  Variability: Moderate  Accels: Present  Decels: Absent  [place]: 2 in 10 minutes    A/P:  Hamziggy Cervantes is a 37 year old  at 38w3d by 22w6d US, here for IOL for oligo.    #Labor:  - SVE: ft/l/h (2100) > PO miso x3 (0239) > 1.5/50/-3, davis 60 mL (0515)  - Pain: planning epidual  - GBS neg  - Plan: Continue PO misoprostol and davis balloon.    #Fetal Well Being  - Category I FHT  - Continuous EFM  - Interventions PRN  - EFW 7.5#  - Cephalic    Marie Conner MD  Ob/Gyn Resident, PGY-2  2022 5:43 AM

## 2022-12-02 NOTE — PROGRESS NOTES
[facility]  Labor Progress Note - FHT Strip Review    S:   FHT strip reviewed    O:   Patient Vitals for the past 4 hrs:   BP Temp Temp src   22 0028 105/61 97.8  F (36.6  C) Oral     FHT  Baseline: 140  Variability: Moderate  Accels: Present  Decels: Absent  [place]: 2 in 10 minutes    A/P:  Mark Cervantes is a 37 year old  at 38w3d by 22w6d US, here for IOL for oligo.    #Labor:  - SVE: ft/l/h (2100) > PO miso x2 (0023)  - Pain: planning epidual  - GBS neg  - Plan: Continue PO misoprostol    #Fetal Well Being  - Category I FHT  - Continuous EFM  - Interventions PRN  - EFW 7.5#  - Cephalic    Marie Conner MD  Ob/Gyn Resident, PGY-2  2022 12:53 AM

## 2022-12-02 NOTE — H&P
Ob/Gyn History and Physical   2022  Mark Cervantes  [id number]      HPI: Mark Cervantes is a 37 year old  at 38w2d by 22w6d US who presents here for IOL for oligohydramnios.    Patient presented to [facility] for scheduled follow up, was found to have LATRICE of 1.8 by BSUS. She was then sent to [facility]. LATRICE there was 1.98 and induction was recommended.    She is feeling well today. She has not had any leaking fluid or gushes of fluid. Denies contractions, vaginal bleeding. She is feeling normal fetal movement. Reports having strep and Influenza A about a month ago but has felt well since then.  She denies headache, vision changes, chest pain, shortness of breath, fever, chills, nausea, vomiting or other systemic complaints.    Her pregnancy has been complicated by:  - hep C antibody positive, RNA negative  - History of GDM  - New diagnosis of oligohydramnios    ROS: No headaches, vision changes, nausea, vomiting, fevers, chills, chest pain, SOB, abdominal pain, constipation, diarrhea, dysuria, changes in vaginal discharge or edema in extremities noted.     OBHX:   OB History    Para Term  AB Living   6 5 5 0 0 5   SAB IAB Ectopic Multiple Live Births   0 0 0 0 5      # Outcome Date GA Lbr Bryce/2nd Weight Sex Delivery Anes PTL Lv   6 Current            5 Term 20 40w1d 07:55 / 00:13 4.252 kg (9 lb 6 oz) F Vag-Spont EPI  JIM      Name: MILI,FEMALE-HAMDI      Apgar1: 9  Apgar5: 9   4 Term 18 41w1d 02:00 / 00:19 3.78 kg (8 lb 5.3 oz) M Vag-Spont EPI N JIM      Name: MILIBABY1 HAMDI      Apgar1: 9  Apgar5: 9   3 Term 09/25/15 39w3d 03:30 / 00:11 3.74 kg (8 lb 3.9 oz) F Vag-Spont EPI  JIM      Apgar1: 6  Apgar5: 9   2 Term 10/23/13 39w4d 05:40 / 00:38 3.465 kg (7 lb 10.2 oz) F Vag-Spont EPI  JIM      Name: MOHAMED,G1 HAMDI I      Apgar1: 9  Apgar5: 9   1 Term 12 39w6d 15:00 / 04:33 3.459 kg (7 lb 10 oz) F Vag-Vacuum EPI, Local N JIM      Name: ROSENDO CERVANTES  "HAMDI      Apgar1: 8  Apgar5: 9      Obstetric Comments   Hx GDM with all pregnancies.  HTN after delivery in 2018-required medication.  No PPH.        Physical Exam:  Vitals:    22   BP:  98/54    BP Location:  Left arm    Patient Position:  Left side    Cuff Size:  Adult Regular    Resp: 18     Temp: 98.7  F (37.1  C)     TempSrc: Oral     Weight:   94.3 kg (208 lb)   Height:   1.676 m (5' 6\")     General: alert, oriented female, resting in bed in NAD  CV: regular rate and rhythm.  Lungs: Breathing comfortably on RA.  Abdomen: soft, gravid, non-tender.  Extremities: bilateral lower extremities non-tender without edema.  SSE: Normal vulva and vagina, no pooling or fluid. No blood, normal discharge. Cervix appeared digitally 0-1 cm  SVE: Fingertip/long/high    Presentation: Cephalic by BSUS     NST:  FHT: baseline 150-160, moderate variability, accelerations present, no decelerations  [place]: No contractions    Assessment/Plan: 37 year old  at 38w0d by 22w6d US who presents for IOL for oligohydramnios.    #IOL, Oligohydramnios  - Patient with LATRICE of 1.98 in MFM clinic on , now here for induction for oligohydramnios.  - Admit to labor and delivery for IOL  - SVE: ft/long/high (2100)  - Pain: planning epidural  - GBS neg  - Plan: begin induction with misoprostol    #Fetal Well Being  - Category I FHT  - Continuous EFM  - Interventions PRN  - EFW 7.5#    #PNC:     - BMI 33.6  - Rh positive, Rubella immune, GCT wnl  - Hep B Antigen neg  - GBS neg  - Other infectious labs neg  - Placenta: anterior no previa  - Immunization: s/p TDAP and flu, COVID  - Pap: NILM 2020  - Feed: Breast  - BC: not discussed    Patient discussed with Dr. Cassie Conner MD  Ob/Gyn Resident, PGY-2  2022 9:30 PM    Physician Attestation   I saw this patient with the resident and agree with the resident/fellow's findings and plan of care as documented in the note.      I " personally reviewed vital signs, labs and fetal tracing.    Key findings: NST reactive. Here for IOL due to oligo. Cervix is unfavorable so will need some ripening with misoprostol and she prefers oral dosing. Understands the plan and no questions. Anticipate  in next day or so.    JHONNY LAMB MD  Date of Service (when I saw the patient): 22

## 2022-12-02 NOTE — PLAN OF CARE
Pt afebrile and VSS. See flowsheets for EFM and toco documentation. Pt states she feels abdominal tightening with contractions, but no pain. She denies LOF or vaginal bleeding. She also denies headaches, vision changes, or RUQ pain. Pt received 4 doses of oral miso and a davis balloon was placed at 0530. She is coping with labor by resting and family support. Voiding appropriately and tolerating diet well. Antecipate . Report given to Malu Cuevas RN.

## 2022-12-02 NOTE — PROGRESS NOTES
"At about 0930 patient requested to RN's to have IV fluids for epidural started, stating that she was feeling more \"cramping\" and back pain. RN started 37707 mL LR bolus for epidural into R PIV. Upon reassessment at 1020, patient stated she wanted to wait to get epidural until after davis balloon comes out. IV LR rate decreased to 125 mL/hr. Davis balloon remains in place at this time, RN encouraged patient to pull on davis after using toilet. RN will return for reassessment.  "

## 2022-12-02 NOTE — PROVIDER NOTIFICATION
12/02/22 1447   Provider Notification   Provider Name/Title Dr. SEBASTIAN Tidwell   Method of Notification Electronic Page   Request Evaluate - Remote   Notification Reason Status Update   Pt requesting epidural. Bolus started. Pt reporting intermittent vaginal and rectal pressure with contractions. Dr. SEBASTIAN Tidwell updated.

## 2022-12-02 NOTE — PLAN OF CARE
Data: Patient admitted to room 442 at 1941. Patient is a . Prenatal record reviewed.   OB History    Para Term  AB Living   6 5 5 0 0 5   SAB IAB Ectopic Multiple Live Births   0 0 0 0 5      # Outcome Date GA Lbr Bryce/2nd Weight Sex Delivery Anes PTL Lv   6 Current            5 Term 20 40w1d 07:55 / 00:13 4.252 kg (9 lb 6 oz) F Vag-Spont EPI  JIM      Name: MILIFEMALE-HAMDI      Apgar1: 9  Apgar5: 9   4 Term 18 41w1d 02:00 / 00:19 3.78 kg (8 lb 5.3 oz) M Vag-Spont EPI N JIM      Name: MILI,BABY1 HAMDI      Apgar1: 9  Apgar5: 9   3 Term 09/25/15 39w3d 03:30 / 00:11 3.74 kg (8 lb 3.9 oz) F Vag-Spont EPI  JIM      Apgar1: 6  Apgar5: 9   2 Term 10/23/13 39w4d 05:40 / 00:38 3.465 kg (7 lb 10.2 oz) F Vag-Spont EPI  JIM      Name: ROSENDO GARCES HAMDI I      Apgar1: 9  Apgar5: 9   1 Term 12 39w6d 15:00 / 04:33 3.459 kg (7 lb 10 oz) F Vag-Vacuum EPI, Local N JIM      Name: ROSENDO GARCES HAMDI      Apgar1: 8  Apgar5: 9      Obstetric Comments   Hx GDM with all pregnancies.  HTN after delivery in 2018-required medication.  No PPH.    .  Medical History:   Past Medical History:   Diagnosis Date     Anemia      Diabetes mellitus (H)     Gestational diabetes diet controlled     Tuberculosis     skin test + but chest xray ok   .  Gestational age 38w2d. Vital signs per doc flowsheet. Fetal movement present. Patient reports Induction Of Labor   as reason for admission. Support persons not present.  Action:  Care of patient assumed at 1950. Verbal consent for EFM, external fetal monitors applied. Admission assessment completed. Patient and support persons educated on labor process. Patient instructed to report change in fetal movement, contractions, vaginal leaking of fluid or bleeding, abdominal pain, or any concerns related to the pregnancy to her nurse/physician. Patient oriented to room, call light in reach.   Response: Dr. Conner informed of patient's arrival. Plan per provider is to start  induction with miso. Patient verbalized understanding of education and verbalized agreement with plan. Patient coping with labor via resting.

## 2022-12-02 NOTE — ANESTHESIA PROCEDURE NOTES
"Epidural catheter Procedure Note    Pre-Procedure   Staff -        Anesthesiologist:  Jennifer Huertas MD       Performed By: anesthesiologist       Location: OR       Procedure Start/Stop Times: 12/2/2022 4:53 PM and 12/2/2022 5:34 PM       Pre-Anesthestic Checklist: patient identified, IV checked, risks and benefits discussed, informed consent, monitors and equipment checked, pre-op evaluation, at physician/surgeon's request and post-op pain management  Timeout:       Correct Patient: Yes        Correct Procedure: Yes        Correct Site: Yes        Correct Position: Yes   Procedure Documentation  Procedure: epidural catheter       Patient Position: sitting       Skin prep: Chloraprep       Local skin infiltrated with mL of 1% lidocaine.        Insertion Site: L3-4. (midline approach).       Technique: LORT saline        Needle Type: [employer] needle       Needle Gauge: 17.        Catheter: 19 G.          Catheter threaded easily.         5 cm epidural space.         Threaded 12 cm at skin.         # of attempts: 3 and  # of redirects:  1    Assessment/Narrative         Paresthesias: No.       Test dose of 3 mL at.         Test dose negative, 3 minutes after injection, for signs of intravascular, subdural, or intrathecal injection.       Insertion/Infusion Method: LORT saline       Aspiration negative for Heme or CSF via Epidural Catheter.    Medication(s) Administered   Medication Administration Time: 12/2/2022 4:53 PM      FOR [facility] ONLY:   Pain Team Contact information: please page the Pain Team [address] [employer]. Search \"Pain\". During daytime hours, please page the attending first. At night please page the resident first.    "

## 2022-12-02 NOTE — PROGRESS NOTES
[facility]  Labor Progress Note    S:   Patient reports feeling well. No questions or concerns at this time.    O:   Patient Vitals for the past 4 hrs:   BP Temp Temp src Resp   22 0818 103/63 98  F (36.7  C) -- --   22 0443 100/58 98.5  F (36.9  C) Oral 16     Gen: NAD  SVE: Deferred.    FHT  Baseline: 145  Variability: Moderate  Accels: Present  Decels: Absent  [place]: 2 in 10 minutes    A/P:  Mark Cervantes is a 37 year old  at 38w3d by 22w6d US, here for IOL for oligo.    #Labor:  - Undergoing cervical ripening with PO miso and davis in place.  - Pain: planning epidual  - GBS neg  - Plan: Continue PO misoprostol and davis balloon. Will recheck SVE if davis still in place prior to dose scheduled just before 11 am.    #Fetal Well Being  - Category I FHT  - Continuous EFM  - Interventions PRN    Curtis Tidwell MD MPH  Ob/Gyn Resident, PGY-2  2022 8:19 AM

## 2022-12-02 NOTE — PROGRESS NOTES
Intrapartum Progress Note    S: Patient reports she is feeling some uncomfortable contractions.      O:   Patient Vitals for the past 4 hrs:   BP Temp Temp src   22 0818 103/63 98  F (36.7  C) Oral   ]   Gen: awake, alert, answering questions appropriately, appears slightly uncomfortable with contractions    FHT:   Baseline 148 bpm   Variability moderate  Accels Present  Decels Absent     [place]: 2-3 contractions in 10 min    Membranes: intact    A/P: 37 year old  at 38w3d by 22w6d us admitted for induction of labor for oligohydramnios, currently undergoing cervical ripening with davis balloon    Induction of labor:  S/p PO misoprostol x4, most recent at 0639  Davis balloon in place with 60ml in uterine balloon.  Discussed with patient plan for IV pitocin when balloon falls out and AROM when able.    Pain- patient plans epidural.  Discussed getting prior to AROM    FWB: Category 1, reactive     Sharon Garcia MD 8:58 AM

## 2022-12-02 NOTE — PROVIDER NOTIFICATION
12/02/22 1741   Provider Notification   Provider Name/Title Dr. SEBASTIAN Tidwell   Method of Notification Electronic Page   Request Evaluate in Person   Notification Reason SVE   Epidural placed. Patient comfortable. Pt requesting SVE. Dr. SEBASTIAN Tidwell updated.

## 2022-12-02 NOTE — PROGRESS NOTES
[facility]  Labor Progress Note - FHT Strip Review  O:   Patient Vitals for the past 4 hrs:   BP Temp Temp src Resp SpO2   22 1644 108/69 -- -- -- --   22 1543 118/68 98.1  F (36.7  C) Oral 18 --   22 1457 -- -- -- -- 99 %   22 1351 99/55 97.3  F (36.3  C) Oral -- --     FHT  Baseline: 150  Variability: Moderate  Accels: Present  Decels: Absent  [place]: 4-5 in 10 minutes    A/P:  Mark Cervantes is a 37 year old  at 38w3d by 22w6d US, here for IOL for oligo.    #Labor:  - S/p cervical ripening, now on pitocin.  - Pain: prepping for epidual  - GBS neg  - Plan: Continue titration of pitocin until epidural in place. Then AROM.    #Fetal Well Being  - Category I FHT  - Continuous EFM  - Interventions PRN    Anticipate .    Curtis Tidwell MD MPH  Ob/Gyn Resident, PGY-2  2022 4:45 PM

## 2022-12-02 NOTE — PROGRESS NOTES
[facility]  Labor Progress Note    S: Starting to feel more contractions, stronger and more intense. Agreeable to an exam.    O:   Patient Vitals for the past 4 hrs:   BP Temp Temp src   22 0818 / 98  F (36.7  C) Oral   22 0810 103/63 -- --     Gen: NAD  SVE: Davis in place, cervix 2-3 cm dilated    FHT  Baseline: 150  Variability: Moderate  Accels: Present  Decels: Absent  [place]: 2-3 in 10 minutes    A/P:  Mark Cervantes is a 37 year old  at 38w3d by 22w6d US, here for IOL for oligo.    #Labor:  - Undergoing cervical ripening with PO miso and davis in place.  - Pain: planning epidual  - GBS neg  - Plan: Discontinue PO misoprostol and start pitocin titration at this time; AROM PRN when davis out    #Fetal Well Being  - Category I FHT  - Continuous EFM  - Interventions PRN    Curtis Tidwell MD MPH  Ob/Gyn Resident, PGY-2  2022 10:45 AM

## 2022-12-03 PROCEDURE — 59410 OBSTETRICAL CARE: CPT | Mod: GC | Performed by: OBSTETRICS & GYNECOLOGY

## 2022-12-03 PROCEDURE — 120N000002 HC R&B MED SURG/OB UMMC

## 2022-12-03 PROCEDURE — 722N000001 HC LABOR CARE VAGINAL DELIVERY SINGLE

## 2022-12-03 PROCEDURE — 250N000013 HC RX MED GY IP 250 OP 250 PS 637: Performed by: STUDENT IN AN ORGANIZED HEALTH CARE EDUCATION/TRAINING PROGRAM

## 2022-12-03 PROCEDURE — 250N000011 HC RX IP 250 OP 636: Performed by: STUDENT IN AN ORGANIZED HEALTH CARE EDUCATION/TRAINING PROGRAM

## 2022-12-03 PROCEDURE — 258N000003 HC RX IP 258 OP 636

## 2022-12-03 PROCEDURE — 250N000011 HC RX IP 250 OP 636

## 2022-12-03 PROCEDURE — 250N000013 HC RX MED GY IP 250 OP 250 PS 637

## 2022-12-03 RX ORDER — CARBOPROST TROMETHAMINE 250 UG/ML
250 INJECTION, SOLUTION INTRAMUSCULAR
Status: DISCONTINUED | OUTPATIENT
Start: 2022-12-03 | End: 2022-12-04 | Stop reason: HOSPADM

## 2022-12-03 RX ORDER — METHYLERGONOVINE MALEATE 0.2 MG/ML
200 INJECTION INTRAVENOUS
Status: DISCONTINUED | OUTPATIENT
Start: 2022-12-03 | End: 2022-12-04 | Stop reason: HOSPADM

## 2022-12-03 RX ORDER — MODIFIED LANOLIN
OINTMENT (GRAM) TOPICAL
Status: DISCONTINUED | OUTPATIENT
Start: 2022-12-03 | End: 2022-12-04 | Stop reason: HOSPADM

## 2022-12-03 RX ORDER — OXYTOCIN 10 [USP'U]/ML
10 INJECTION, SOLUTION INTRAMUSCULAR; INTRAVENOUS
Status: DISCONTINUED | OUTPATIENT
Start: 2022-12-03 | End: 2022-12-04 | Stop reason: HOSPADM

## 2022-12-03 RX ORDER — IBUPROFEN 800 MG/1
800 TABLET, FILM COATED ORAL EVERY 6 HOURS PRN
Status: DISCONTINUED | OUTPATIENT
Start: 2022-12-03 | End: 2022-12-04 | Stop reason: HOSPADM

## 2022-12-03 RX ORDER — OXYTOCIN/0.9 % SODIUM CHLORIDE 30/500 ML
340 PLASTIC BAG, INJECTION (ML) INTRAVENOUS CONTINUOUS PRN
Status: DISCONTINUED | OUTPATIENT
Start: 2022-12-03 | End: 2022-12-04 | Stop reason: HOSPADM

## 2022-12-03 RX ORDER — HYDROCORTISONE 25 MG/G
CREAM TOPICAL 3 TIMES DAILY PRN
Status: DISCONTINUED | OUTPATIENT
Start: 2022-12-03 | End: 2022-12-04 | Stop reason: HOSPADM

## 2022-12-03 RX ORDER — ACETAMINOPHEN 325 MG/1
650 TABLET ORAL EVERY 4 HOURS PRN
Status: DISCONTINUED | OUTPATIENT
Start: 2022-12-03 | End: 2022-12-04 | Stop reason: HOSPADM

## 2022-12-03 RX ORDER — BISACODYL 10 MG
10 SUPPOSITORY, RECTAL RECTAL DAILY PRN
Status: DISCONTINUED | OUTPATIENT
Start: 2022-12-03 | End: 2022-12-04 | Stop reason: HOSPADM

## 2022-12-03 RX ORDER — TRANEXAMIC ACID 10 MG/ML
1 INJECTION, SOLUTION INTRAVENOUS EVERY 30 MIN PRN
Status: DISCONTINUED | OUTPATIENT
Start: 2022-12-03 | End: 2022-12-04 | Stop reason: HOSPADM

## 2022-12-03 RX ORDER — DOCUSATE SODIUM 100 MG/1
100 CAPSULE, LIQUID FILLED ORAL DAILY
Status: DISCONTINUED | OUTPATIENT
Start: 2022-12-03 | End: 2022-12-04 | Stop reason: HOSPADM

## 2022-12-03 RX ORDER — MISOPROSTOL 200 UG/1
400 TABLET ORAL
Status: DISCONTINUED | OUTPATIENT
Start: 2022-12-03 | End: 2022-12-04 | Stop reason: HOSPADM

## 2022-12-03 RX ORDER — MISOPROSTOL 200 UG/1
800 TABLET ORAL
Status: DISCONTINUED | OUTPATIENT
Start: 2022-12-03 | End: 2022-12-04 | Stop reason: HOSPADM

## 2022-12-03 RX ADMIN — METHYLERGONOVINE MALEATE 200 MCG: 0.2 INJECTION INTRAVENOUS at 03:52

## 2022-12-03 RX ADMIN — MISOPROSTOL 400 MCG: 200 TABLET ORAL at 01:39

## 2022-12-03 RX ADMIN — SODIUM CHLORIDE, SODIUM LACTATE, POTASSIUM CHLORIDE, CALCIUM CHLORIDE 1000 ML: 600; 310; 30; 20 INJECTION, SOLUTION INTRAVENOUS at 00:45

## 2022-12-03 RX ADMIN — ACETAMINOPHEN 650 MG: 325 TABLET, FILM COATED ORAL at 22:15

## 2022-12-03 RX ADMIN — IBUPROFEN 800 MG: 800 TABLET, FILM COATED ORAL at 08:21

## 2022-12-03 RX ADMIN — IBUPROFEN 800 MG: 800 TABLET, FILM COATED ORAL at 16:07

## 2022-12-03 RX ADMIN — DOCUSATE SODIUM 100 MG: 100 CAPSULE, LIQUID FILLED ORAL at 08:22

## 2022-12-03 RX ADMIN — KETOROLAC TROMETHAMINE 30 MG: 30 INJECTION, SOLUTION INTRAMUSCULAR at 02:20

## 2022-12-03 RX ADMIN — SODIUM CHLORIDE, POTASSIUM CHLORIDE, SODIUM LACTATE AND CALCIUM CHLORIDE 1000 ML: 600; 310; 30; 20 INJECTION, SOLUTION INTRAVENOUS at 00:45

## 2022-12-03 ASSESSMENT — ACTIVITIES OF DAILY LIVING (ADL)
ADLS_ACUITY_SCORE: 22

## 2022-12-03 NOTE — PROVIDER NOTIFICATION
12/03/22 0349   Provider Notification   Provider Name/Title Dr. Pantoja   Method of Notification Electronic Page   Request Evaluate - Remote;Evaluate in Person   Notification Reason Bleeding   Dr. Pantoja states to give IM metherrgine, as high blood pressures were in a previous pregnancy and she has not had any recent problems with hypertension.

## 2022-12-03 NOTE — PROGRESS NOTES
[facility]  Labor Progress Note     S: Comfortable with epidural in place. Feeling tired now.    O:   Patient Vitals for the past 4 hrs:   BP Temp Temp src Resp SpO2   22 1752 -- 98.1  F (36.7  C) Oral -- --   22 1644 108/69 -- -- -- --   22 1543 118/68 98.1  F (36.7  C) Oral 18 --   22 1457 -- -- -- -- 99 %   Gen: NAD  SVE: 3-4/50/-3    BSUS: Cephalic.    FHT  Baseline: 150  Variability: Moderate  Accels: Present  Decels: Absent  [place]: 4-5 in 10 minutes    A/P:  Hamziggy Cervantes is a 37 year old  at 38w3d by 22w6d US, here for IOL for oligo.    #Labor:  - S/p cervical ripening, now on pitocin.  - Pain: prepping for epidual  - GBS neg  - Plan: Continue titration of pitocin at this time    #Fetal Well Being  - Category I FHT  - Continuous EFM  - Interventions PRN    Curtis Tidwell MD MPH  Ob/Gyn Resident, PGY-2  2022 6:21 PM

## 2022-12-03 NOTE — PLAN OF CARE
Goal Outcome Evaluation:  VSS and postpartum assessments WDL. Bleeding light to scant now, no gushing or trickling with checks. Up ad reta with steady gait and independent with cares. Voided once since delivery. Bonding well with infant.  Breastfeeding and bottle feeding, but did breastfeed independently.  Pain managed with Ibuprofen.  PIV in right forearm, pitocin still infusing.  Mom Janet present and supportive,  will be here this morning.  Will continue with postpartum cares and education per plan of care.

## 2022-12-03 NOTE — PROVIDER NOTIFICATION
12/03/22 0042   Provider Notification   Provider Name/Title Dr. Pantoja   Method of Notification In Department   Request Evaluate - Remote   Notification Reason Other (Comment)   Reviewed plan of care with provider.

## 2022-12-03 NOTE — PLAN OF CARE
Goal Outcome Evaluation:    Vaginal Delivery Note   of viable Male with Dr. Pantoja and Dr. Garcia in attendance.  NICU and Nursery RN Rudy GOLDSMITH present.  Infant with spontaneous cry, to mother's abdomen, dried and stimulated.  APGAR at 1 minute:  7 and APGAR at 5 minutes:  9. NICU dismissed by Nursery RN. Placenta delivered with out complication, superficial laceration, without repair, leila cares provided.  Mother and baby in stable condition.

## 2022-12-03 NOTE — PROVIDER NOTIFICATION
12/03/22 0309   Provider Notification   Provider Name/Title Dr. Pantoja   Method of Notification At Bedside   Request Evaluate in Person   Notification Reason Bleeding   Dr. Pantoja at bedside assessing pt. Provider states to get pt up to bathroom and reassess.

## 2022-12-03 NOTE — PLAN OF CARE
Data: Mark Cervantes transferred to [id number] via wheelchair at 0422. Baby transferred via parent's arms.  Action: Receiving unit notified of transfer: Yes. Patient and family notified of room change. Report given to Janice at 0248. Belongings sent to receiving unit. Accompanied by Registered Nurse. Oriented patient to surroundings. Call light within reach. ID bands double-checked with receiving RN.  Response: Patient tolerated transfer and is stable.

## 2022-12-03 NOTE — DISCHARGE SUMMARY
[facility] Discharge Summary    Mark Cervantes MRN# [id number]   Age: 37 year old YOB: 1985     Date of Admission:  2022  Date of Discharge::  22  Admitting Physician:  Anabell Matthews MD  Discharge Physician:  Melissa Garcia MD            Admission Diagnoses:   -   - IUP at 38w4d  - oligohydramnios          Discharge Diagnosis:   - Postpartum, s/p delivery of viable infant            Procedures:     Procedure(s): - Normal spontaneous vaginal delivery  - Epidural               Medications Prior to Admission:     Medications Prior to Admission   Medication Sig Dispense Refill Last Dose     ferrous gluconate (FERGON) 324 (38 Fe) MG tablet Take 324 mg by mouth daily (with breakfast)   2022 at 0900     Prenatal Vit-Fe Fumarate-FA (PRENATAL MULTIVITAMIN W/IRON) 27-0.8 MG tablet Take 1 tablet by mouth daily 90 tablet 3 2022 at 0900     Prenat-Fe Carbonyl-FA-Omega 3 (ONE-A-DAY WOMENS PRENATAL 1 PO) Take by mouth daily (Patient not taking: Reported on 2022)        [DISCONTINUED] paragard intrauterine copper device 1 each by Intrauterine route once (Patient not taking: Reported on 3/14/2022)        [DISCONTINUED] vitamin B-Complex Take 1 tablet by mouth daily (Patient not taking: Reported on 2022)        [DISCONTINUED] vitamin D3 (CHOLECALCIFEROL) 50 mcg (2000 units) tablet Take 1 tablet (50 mcg) by mouth daily (Patient not taking: Reported on 2022) 100 tablet 3              Discharge Medications:        Review of your medicines      START taking      Dose / Directions   acetaminophen 500 MG tablet  Commonly known as: TYLENOL  Used for:  (normal spontaneous vaginal delivery)      Dose: 500-1,000 mg  Take 1-2 tablets (500-1,000 mg) by mouth every 6 hours as needed for mild pain  Quantity: 100 tablet  Refills: 0     ibuprofen 600 MG tablet  Commonly known as: ADVIL/MOTRIN  Used for:  (normal spontaneous vaginal delivery)      Dose: 600 mg  Take  1 tablet (600 mg) by mouth every 6 hours as needed for moderate pain (4-6)  Quantity: 90 tablet  Refills: 0     polyethylene glycol 17 GM/Dose powder  Commonly known as: MIRALAX  Used for:  (normal spontaneous vaginal delivery)      Dose: 1 capful  Take 17 g (1 capful) by mouth daily  Quantity: 850 g  Refills: 0        CONTINUE these medicines which have NOT CHANGED      Dose / Directions   ferrous gluconate 324 (38 Fe) MG tablet  Commonly known as: FERGON      Dose: 324 mg  Take 324 mg by mouth daily (with breakfast)  Refills: 0     ONE-A-DAY WOMENS PRENATAL 1 PO      Take by mouth daily  Refills: 0     prenatal multivitamin w/iron 27-0.8 MG tablet  Used for: AMA (advanced maternal age) multigravida 35+, unspecified trimester      Dose: 1 tablet  Take 1 tablet by mouth daily  Quantity: 90 tablet  Refills: 3        STOP taking    paragard intrauterine copper device        vitamin B-Complex        vitamin D3 50 mcg (2000 units) tablet  Commonly known as: CHOLECALCIFEROL              Where to get your medicines      These medications were sent to [facility] [address]  [address] [address]    Phone: [phone]     acetaminophen 500 MG tablet    ibuprofen 600 MG tablet    polyethylene glycol 17 GM/Dose powder               Consultations:   Anesthesiology          Brief History of Admission and Labor:   Mark Cervantes is a 37 year old now  who presented at 38w4d for IOL for oligohydramnios. Her IOL began with misoprostol and a cervcal ripening balloon. She was augmented with pitocin, AROM was performed at 4cm.  Labor course was uncomplicated. FHT were category 1 during labor. She then made rapid change from 7 to 10cm, FHT with a deceleration at that time that then recovered. She was then complete and pushed over 2 contractions, after which she had a spontaneous vaginal delivery of a viable male infant in OA position.  No nuchal cord was  noted.  Apgars of 7 and 9 with weight of 3120 g.  The cord was double clamped after 60 seconds and cut.  A cord segment and cord blood were obtained. IV pitocin was started for PPH prophylaxis. The placenta was then delivered using gentle traction and suprapubic pressure.  The uterus was noted to be firm after fundal massage.  The perineum was assessed for lacerations and none were noted. She had a small area of skin separation anteriorly that was hemostatic and so not repaired. Total QBL was 100mL.  The placenta appeared intact with a 3V umbilical cord.            Postpartum Hospital Course:   The patient's postpartum course was unremarkable.  On discharge, her pain was well controlled. Vaginal bleeding is similar to peak menstrual flow.  Voiding without difficulty.  Ambulating well and tolerating a normal diet.  No fever.  Breastfeeding well.  Infant is stable.  She was discharged on post-partum day #1.    Post-partum hemoglobin: 11.4    Contraception: mirena at 6w PP    Rhogam was not indicated          Discharge Instructions and Follow-Up:     Discharge diet: Regular   Discharge activity: Activity as tolerated   Discharge follow-up: Follow up with your primary OBGYN provider in six weeks for a routine postpartum visit                Discharge Disposition:     Discharged to home      Attestation:  I have reviewed today's vital signs, notes, medications, labs and imaging.    Avi Pantoja MD  OB/GYN PGY-3  12/04/2022 10:15 AM         Physician Attestation   I saw and evaluated this patient prior to discharge.  I discussed the patient with the resident/fellow and agree with plan of care as documented in the note.      I personally reviewed vital signs, medications and labs.    I personally spent 5 minutes on discharge activities.    Sharon Garcia MD  Date of Service (when I saw the patient): 12/04/22

## 2022-12-03 NOTE — PROVIDER NOTIFICATION
12/03/22 0209   Provider Notification   Provider Name/Title Dr. Pantoja   Method of Notification Phone   Request Evaluate - Remote   Notification Reason Bleeding     Provider called to notify bag of Pitocin has 270ml left and if provider wanted another bag hung. Provider verbally stated they would like a second bag of 500ml Pitocin hung. Namrata TUBBS, primary RN notified.

## 2022-12-03 NOTE — PROGRESS NOTES
Patient arrived to [facility] unit via wheelchair at 0428,with belongings, accompanied by family, with infant in arms. Received report from MAGEN Fuller and checked bands. Unit and room orientation started. Call light given; no concerns present at this time. Continue with plan of care.

## 2022-12-03 NOTE — L&D DELIVERY NOTE
OB Vaginal Delivery Note    Mark Cervantes MRN# [id number]   Age: 37 year old YOB: 1985       GA: 38w4d  GP:   Labor Complications: None   EBL:   mL  Delivery QBL:    Delivery Type: Vaginal, Spontaneous   ROM to Delivery Time: (Delivered) Hours: 3 Minutes: 49   Weight: 3.12 kg (6 lb 14.1 oz)    1 Minute 5 Minute 10 Minute   Apgar Totals: 7   9        RENUKA LEE;ANIVAL PANTOJA;CHARLETTE VERDUZCO     Delivery Details:  L&D Delivery Note:     Mark Cervantes is a 37 year old now  who presented at 38w4d for IOL for oligohydramnios. Her IOL began with misoprostol and a cervcal ripening balloon. She was augmented with pitocin, AROM was performed at 4cm.  Labor course was uncomplicated. FHT were category 1 during labor. She then made rapid change from 7 to 10cm, FHT with a deceleration at that time with FHT that then recovered. She was then complete and pushed over 2 contractions, after which she had a spontaneous vaginal delivery of a viable male infant in OA position.  No nuchal cord was noted.  Apgars of 7 and 9 with weight of 3120 g.  The cord was double clamped after 60 seconds and cut.  A cord segment and cord blood were obtained. IV pitocin was started for PPH prophylaxis. Buccal miso was also administered given her grand multiparity. The placenta was then delivered using gentle traction and suprapubic pressure.  The uterus was noted to be firm after fundal massage.  The perineum was assessed for lacerations and none were noted. She had a small area of skin separation anteriorly that was hemostatic and so not repaired. Total QBL was 100mL.  The placenta appeared intact with a 3V umbilical cord.  Dr. MANDI Garcia was present for the entire procedure. Blood gases pending.     Anival Pantoja MD  OB/GYN PGY-3  2022 7:35 AM       Bryan Cervantes-Mark [[id number]]    Labor Event Times    Labor onset date: 22 Onset time: 10:00 PM   Dilation complete date: 12/3/22 Complete  time:  1:22 AM   Start pushing date/time: 12/3/2022 0122      Labor Length    1st Stage (hrs): 3 (min): 21   2nd Stage (hrs): 0 (min): 12   3rd Stage (hrs): 0 (min): 5      Labor Events     labor?: No   steroids: None  Labor Type: Induction/Cervical ripening  Predominate monitoring during 1st stage: continuous electronic fetal monitoring     Antibiotics received during labor?: No     Rupture identifier: Sac 1  Rupture date/time: 22   Rupture type: Artificial Rupture of Membranes  Fluid color: Clear  Fluid odor: Normal     Induction: Misoprostol, Mechanical ripening agent  Induction date/time: 22   Cervical ripening date/time: 22   Indications for induction: Fetal Abnormality     Augmentation: Oxytocin, AROM  Indications for augmentation: Ineffective Contraction Pattern  1:1 continuous labor support provided by?: RN Labor partogram used?: no      Delivery/Placenta Date and Time    Delivery Date: 12/3/22 Delivery Time:  1:34 AM   Placenta Date/Time: 12/3/2022  1:39 AM  Oxytocin given at the time of delivery: after delivery of baby  Delivering clinician: Sharon Garcia MD   Other personnel present at delivery:  Provider Role   Alina Fallon RN Delivery Nurse   Avi Pantoja MD Resident   Rudy Flores RN Registered Nurse         Vaginal Counts     Initial count performed by 2 team members:  Two Team Members   Alina Pantoja MD       Needles Suture Needles Sponges (RETIRED) Instruments   Initial counts 2  5    Added to count       Relief counts       Final counts 2  5          Placed during labor Accounted for at the end of labor   FSE Yes Yes   IUPC No NA   Cervidil No NA              Final count performed by 2 team members:  Two Team Members   Alina Pantoja MD      Final count correct?: Yes     Apgars    Living status: Living   1 Minute 5 Minute 10 Minute 15 Minute 20 Minute   Skin color: 1  1       Heart rate: 2  2      "  Reflex irritability: 1  2       Muscle tone: 1  2       Respiratory effort: 2  2       Total: 7  9       Apgars assigned by: CHARLETTE GOLDSMITH RN     Cord    Vessels: 3 Vessels    Cord Complications: None               Cord Blood Disposition: Lab    Gases Sent?: Yes    Delayed cord clamping?: Yes    Cord Clamping Delay (seconds): 31-60 seconds    Stem cell collection?: No        Resuscitation    Methods: None  [place] Care at Delivery: Data: male baby born at 0134. Delivery remarkable for decels prior to delivery, NICU at delivery and dismissed at 1 minute of life.  Action: Interventions at birth were drying, bulb suctioning, and warm blankets. Infant placed skin-to-skin with mother.  Response: Stable . Positive bonding behaviors observed.          Measurements    Weight: 6 lb 14.1 oz Length: 1' 7\"   Head circumference: 35.6 cm       Skin to Skin and Feeding Plan    Skin to skin initiation date/time: 1841    Skin to skin with: Mother  Skin to skin end date/time:     Breastfeeding initiated date/time: 12/3/2022 0239     Labor Events and Shoulder Dystocia    Fetal Tracing Prior to Delivery: Category 1  Shoulder dystocia present?: Neg     Delivery (Maternal) (Provider to Complete) (155085)    Episiotomy: None  Perineal lacerations: None    Repair suture: None  Genital tract inspection done: Pos     Blood Loss  Mother: Mark Cervantes #[id number]   Start of Mother's Information    Delivery Blood Loss  22 2200 - 22 0735    Delivery QBL (mL) Hospital Encounter 110 mL    Postpartum QBL (mL) Hospital Encounter 130 mL    Total  240 mL         End of Mother's Information  Mother: Mark Cervantes #[id number]          Delivery - Provider to Complete (035480)    Delivering clinician: Sharon Garcia MD  Attempted Delivery Types (Choose all that apply): Spontaneous Vaginal Delivery  Delivery Type (Choose the 1 that will go to the Birth History): Vaginal, Spontaneous                   Other " personnel:  Provider Role   Alina Fallon, RN Delivery Nurse   Avi Pantoja MD Resident   Rudy Flores, RN Registered Nurse                Placenta    Date/Time: 12/3/2022  1:39 AM  Removal: Spontaneous  Disposition: Hospital disposal           Anesthesia    Method: Epidural                Presentation and Position    Presentation: Vertex    Position: Middle Occiput Anterior                 Sharon Garcia MD     Physician Attestation   I spent a total of 30 minutes with the patient, personally assisting as the resident performed  .       Sharon Garcia MD  Date of Service (when I saw the patient): 22

## 2022-12-03 NOTE — PROGRESS NOTES
[facility]  Labor Progress Note     S: feeling more uncomfortable with contractions. Feeling the urge to pee. She is pushing her button for epidural bolus which is helping some    O:   Patient Vitals for the past 4 hrs:   BP Temp Temp src Resp SpO2   22 0011 120/70 98.4  F (36.9  C) Oral 16 98 %   226 -- -- -- -- 96 %   22 109/58 98.1  F (36.7  C) Oral 18 --   22 -- 97.9  F (36.6  C) Oral 16 --   22 107/62 -- -- -- 96 %     Gen: NAD  SVE: 780/-2    FHT  Baseline 150, moderate variability, + accels, no decels  [place]: 4 in 10 minutes    A/P:  Mark Cervantes is a 37 year old  at 38w3d by 22w6d US, here for IOL for oligo.    #Labor:  - S/p cervical ripening  - continue pitocin, titrate prn  - s/p AROM  - now is progressing in active labor, anticipate   - Pain: epidual  - GBS neg    #Fetal Well Being  - Category I FHT  - Continuous EFM  - Interventions PRN    Avi Pantoja MD  OB/GYN PGY-3  2022 1:19 AM

## 2022-12-03 NOTE — PROVIDER NOTIFICATION
12/03/22 0303   Provider Notification   Provider Name/Title Dr. Pantoja   Method of Notification Electronic Page   Request Evaluate in Person   Notification Reason Bleeding   Pt QBL under 200, but she has had large gushes with last 3 checks. Can you come assess?

## 2022-12-03 NOTE — PROVIDER NOTIFICATION
12/03/22 0110   Provider Notification   Provider Name/Title Dr. Pantoja   Method of Notification Electronic Page   Request Evaluate in Person   Notification Reason SVE;Patient Request   Pt requesting SVE

## 2022-12-03 NOTE — PROGRESS NOTES
Patient remains comfortable after having epidural placed at 1715. Pitocin gtt continues @ 12 mL/hr, LR running @ 125 mL/hr. Contractions are irregular on monitor. Abdomen palpated mild/moderate with contractions. Fetal HR has remained at ~145 baseline with moderate variability and accelerations present and no decelerations present. VS have been stable throughout the day, pt afebrile. Straight catheter done at 1815 for 600 mL. Patient is  currently resting on left side with peanut ball. Mother and friend at bedside. Call light at PCEA button within reach. Encouraged call light use.

## 2022-12-03 NOTE — PROGRESS NOTES
[facility]  Labor Progress Note     S: Comfortable with epidural in place. Says this baby is taking a long time    O:   Patient Vitals for the past 4 hrs:   BP Temp Temp src Resp SpO2   22 107/60 98.3  F (36.8  C) Oral 16 97 %   22 99/58 -- -- 18 --   22 -- -- -- -- 97 %   22 -- -- -- -- 98 %   22 107/66 98.2  F (36.8  C) Oral 16 --   22 -- -- -- -- 97 %   22 105/64 -- -- -- --   22 -- -- -- -- 98 %   22 -- -- -- -- 98 %   22 184 -- -- -- -- 97 %   22 113/70 -- -- -- --   22 183 -- -- -- -- 98 %   22 183 -- -- -- -- 98 %   22 182 -- -- -- -- 97 %   22 182 -- -- -- -- 97 %   22 181 -- -- -- -- 98 %   22 181 -- -- -- -- 99 %   22 115/65 -- -- 16 --   22 180 -- -- -- -- 98 %   22 109/62 -- -- -- --   22 1800 -- -- -- -- 98 %   22 175 101/52 -- -- 16 --   22 -- -- -- -- 98 %     Gen: NAD  SVE: /-3, AROM performed, no fluid, no further bag palpable, fetal sutures palpable    FHT  Baseline: 150  Variability: Moderate  Accels: Present  Decels: Absent  [place]: 4-5 in 10 minutes    A/P:  Mark Cervantes is a 37 year old  at 38w3d by 22w6d US, here for IOL for oligo.    #Labor:  - S/p cervical ripening  - continue pitocin, titrate prn  - s/p AROM  - Pain: epidual working well  - GBS neg    #Fetal Well Being  - Category I FHT  - Continuous EFM  - Interventions PRN    Avi Pantoja MD  OB/GYN PGY-3  2022 10:43 PM

## 2022-12-04 VITALS
OXYGEN SATURATION: 97 % | TEMPERATURE: 98.1 F | HEART RATE: 79 BPM | SYSTOLIC BLOOD PRESSURE: 99 MMHG | RESPIRATION RATE: 18 BRPM | WEIGHT: 208 LBS | DIASTOLIC BLOOD PRESSURE: 81 MMHG | HEIGHT: 66 IN | BODY MASS INDEX: 33.43 KG/M2

## 2022-12-04 LAB — HGB BLD-MCNC: 11.4 G/DL (ref 11.7–15.7)

## 2022-12-04 PROCEDURE — 250N000013 HC RX MED GY IP 250 OP 250 PS 637: Performed by: STUDENT IN AN ORGANIZED HEALTH CARE EDUCATION/TRAINING PROGRAM

## 2022-12-04 PROCEDURE — 36415 COLL VENOUS BLD VENIPUNCTURE: CPT | Performed by: STUDENT IN AN ORGANIZED HEALTH CARE EDUCATION/TRAINING PROGRAM

## 2022-12-04 PROCEDURE — 85018 HEMOGLOBIN: CPT | Performed by: STUDENT IN AN ORGANIZED HEALTH CARE EDUCATION/TRAINING PROGRAM

## 2022-12-04 RX ORDER — ACETAMINOPHEN 500 MG
500-1000 TABLET ORAL EVERY 6 HOURS PRN
Qty: 100 TABLET | Refills: 0 | Status: SHIPPED | OUTPATIENT
Start: 2022-12-04 | End: 2023-08-08

## 2022-12-04 RX ORDER — POLYETHYLENE GLYCOL 3350 17 G/17G
1 POWDER, FOR SOLUTION ORAL DAILY
Qty: 850 G | Refills: 0 | Status: SHIPPED | OUTPATIENT
Start: 2022-12-04 | End: 2023-08-08

## 2022-12-04 RX ORDER — IBUPROFEN 600 MG/1
600 TABLET, FILM COATED ORAL EVERY 6 HOURS PRN
Qty: 90 TABLET | Refills: 0 | Status: SHIPPED | OUTPATIENT
Start: 2022-12-04

## 2022-12-04 RX ADMIN — DOCUSATE SODIUM 100 MG: 100 CAPSULE, LIQUID FILLED ORAL at 08:06

## 2022-12-04 ASSESSMENT — ACTIVITIES OF DAILY LIVING (ADL)
ADLS_ACUITY_SCORE: 22

## 2022-12-04 NOTE — PROGRESS NOTES
[facility]   Post-partum Progress Note    Name:  Mark Cervantes  MRN: [id number]    S:   Patient reports feeling tired, otherwise okay. Ongoing pain at epidural site, sore with periodic sharp pain; otherwise pain adequately controlled.  Tolerating regular diet without nausea or vomiting.  Ambulating without dizziness.  Voiding spontaneously. Has passed flatus; has not yet had bowel movement. Lochia similar to menstrual flow. Denies fever/chills, headache, vision changes, chest pain, shortness of breath, RUQ pain, increased edema. Breast feeding and supplementing with formula.  Desires mirena IUD at postpartum visit for contraception.      O:   Patient Vitals for the past 24 hrs:   BP Temp Temp src Pulse Resp   22 0334 105/61 97.8  F (36.6  C) Oral 71 16   22 109/72 98.3  F (36.8  C) Oral 68 16   22 1215 118/73 97.8  F (36.6  C) Oral 75 16     Gen:  Standing at bedside, ambulating slowly, NAD  CV:  RRR  Pulm:  Non-labored breathing. No cough or audible wheezing.   Abd:  Soft, appropriately tender to palpation, non-distended.  Fundus below umbilicus, firm, and non-tender.  Back:  Bandaid overlying epidural site. Minimal surrounding edema. Slight tenderness to palpation.  Ext:  Non-tender, minimal LE edema b/l    Hgb:   Recent Labs   Lab 22  0629 22   HGB 11.4* 11.5*     A/P:  37 year old  PPD#1 s/p . Pregnancy notable for history of hep C antibody positive, history of GDM, and oligohydramnios. Meeting postpartum goals. Continue with routine postpartum management.     Routine Postpartum  Pain: Well-controlled with scheduled tylenol, ibuprofen; appreciate RN to contact anesthesia re: epidural site pain  Hgb: 11.5 >  > 11.4.   GI:  PRN Senna/Colace.  PRN Simethicone, Miralax. Regular diet. Encourage hydration and ambulation.   PPx:  Encouraged ambulation   Rh: Positive  Rub:  Immune  Hep B: Surface antigen negative  Vax: S/p influenza, TDAP,  COVID  Baby: In room, doing well  Feed: Breast feeding, supplementing with formula  BC: mirena IUD at postpartum clinic visit  Dispo: Plan for home on PPD#1    Patient discussed with Dr. Sharon Garcia.    Melissa De La Rosa MD  Obstetrics & Gynecology, PGY-1  2022 8:42 AM     Physician Attestation   I personally examined and evaluated this patient.  I discussed the patient with the resident/fellow and care team, and agree with the assessment and plan of care as documented in the note of 22.      I personally reviewed vital signs, medications and labs.    Ríos findings: Mark Cervantes is a 37 year old  postpartum day number 1 s/p , currently doing well and meeting goals for discharge to home.  Pain is controlled, voiding without issues, passing flatus, tolerating regular diet, breast feeding.  Discussed normal vs. Abnormal pp bleeding. Reviewed s/sx of pp depression.  Discussed 6 week postpartum visit in clinic and message sent to clinic to coordinate visit.  Discharge to home today.  Sharon Garcia MD  Date of Service (when I saw the patient): 22

## 2022-12-04 NOTE — PLAN OF CARE
AVS reviewed. When to seek follow up care reviewed. Patient has no concerns or questions. Discharge medications provided. Patient discharged ambulatory at 1340.

## 2022-12-04 NOTE — PLAN OF CARE
[id number]:  VSS and postpartum assessments WDL.  Up ad reta with steady gait and independent with cares.  Bonding well with infant.  Breastfeeding on cue independently and also bottle/formula feeding per pt preference, infant tolerating 10-15mls.  Pain managed with ibuprofen per MAR and hot packs.  , Bachar present on and off throughout the shift and supportive.  Pt sleeping between cares today.  Will continue with postpartum cares and education per plan of care.     Problem: Plan of Care - These are the overarching goals to be used throughout the patient stay.    Goal: Plan of Care Review  Description: The Plan of Care Review/Shift note should be completed every shift.  The Outcome Evaluation is a brief statement about your assessment that the patient is improving, declining, or no change.  This information will be displayed automatically on your shift note.  Outcome: Progressing  Flowsheets (Taken 12/3/2022 1813)  Plan of Care Reviewed With: patient  Overall Patient Progress: improving

## 2022-12-04 NOTE — PLAN OF CARE
Goal Outcome Evaluation:  VSS and postpartum assessments WDL.  Up ad reta with steady gait and independent with cares.  Bonding well with infant.  Breastfeeding on cue every 2-3 hours independently as well as supplementing with formula.  Pain managed with Tylenol.   was present and supportive during the evening and went home for the night .  Will continue with postpartum cares and education per plan of care.     Plan of Care Reviewed With: patient    Overall Patient Progress: improvingOverall Patient Progress: improving

## 2022-12-05 ENCOUNTER — TELEPHONE (OUTPATIENT)
Dept: OBGYN | Facility: CLINIC | Age: 37
End: 2022-12-05

## 2022-12-05 NOTE — TELEPHONE ENCOUNTER
----- Message from Brian Garcia MD sent at 12/4/2022  8:58 AM CST -----  Regarding: pp follow up  Please call patient and schedule her for a 6 week postparutm visit. Preferred provider is [facility].  Thanks,  brian

## 2023-01-09 ENCOUNTER — PRENATAL OFFICE VISIT (OUTPATIENT)
Dept: OBGYN | Facility: CLINIC | Age: 38
End: 2023-01-09

## 2023-01-09 VITALS
SYSTOLIC BLOOD PRESSURE: 109 MMHG | WEIGHT: 194.8 LBS | DIASTOLIC BLOOD PRESSURE: 73 MMHG | BODY MASS INDEX: 31.44 KG/M2 | HEART RATE: 60 BPM | TEMPERATURE: 98.1 F

## 2023-01-09 PROCEDURE — 99207 PR POST PARTUM EXAM: CPT | Performed by: OBSTETRICS & GYNECOLOGY

## 2023-01-09 RX ORDER — ACETAMINOPHEN AND CODEINE PHOSPHATE 120; 12 MG/5ML; MG/5ML
0.35 SOLUTION ORAL DAILY
Qty: 84 TABLET | Refills: 3 | Status: SHIPPED | OUTPATIENT
Start: 2023-01-09 | End: 2023-08-08

## 2023-01-09 NOTE — PROGRESS NOTES
SUBJECTIVE:  Mark Cervantes is a 37 year old female  here for a postpartum visit.  She had a  on 12/3/22 delivering a healthy baby at term.      delivery complications:  No  breast feeding:  Yes, but very low milk supply  bladder problems:  No  bowel problems/hemorrhoids:  No but perineum feels too loose    episiotomy/laceration/incision healed? Yes:    vaginal flow:  Had a very light period a week ago.   contraception:  oral contraceptive, maybe come back for nexplanon or mirena  emotional adjustment:  doing well and happy  back to work:  In February    Lab Results   Component Value Date    PAP NIL 2020        OBJECTIVE:  Blood pressure 109/73, pulse 60, temperature 98.1  F (36.7  C), temperature source Tympanic, weight 88.4 kg (194 lb 12.8 oz), last menstrual period 2022, currently breastfeeding.   General - pleasant female in no acute distress.  Breast - no nodularity, asymmetry or nipple discharge bilaterally.  Abdomen - soft, nontender, nondistended, no hepatosplenomegaly.  Pelvic - EG: no lesions, previous infibulation, no stitches left BUS: within normal limits, Vagina: well rugated, no discharge, Cervix: no lesions or CMT, Uterus: firm, normal sized and nontender, Adnexae: no masses or tenderness.  Rectovaginal - deferred.    ASSESSMENT:  normal postpartum exam    PLAN:  Discussed kegel exercises   May resume normal activities without restrictions  Pap smear was not  done today  Patient had questions about why she was not stitched up tight after her delivery.   discussed that the perineal stretching occurs over time with each delivery and at the time of giving birth usually the deep stitches for perineal tightening can not be done.  When she has completed childbearing, then a perineal reconstruction can be done.   Reviewed contraception, family planning and breast feeding expectations.   The patient will use oral contraceptive for birth control. Full counseling was provided, and all  questions answered.   There was not time for implant  Placement today but she is considering coming back later this week for either nexplanon or mirena.         Ayde Cunningham MD

## 2023-01-09 NOTE — Clinical Note
Can you please add in this patient to my schedule this Wednesday when I am on call for mirena IUD.

## 2023-01-10 ENCOUNTER — TELEPHONE (OUTPATIENT)
Dept: OBGYN | Facility: CLINIC | Age: 38
End: 2023-01-10
Payer: COMMERCIAL

## 2023-04-08 NOTE — LETTER
Date:April 1, 2020      Patient was self referred, no letter generated. Do not send.        [facility] Health Information       Report received from Yesi JACKSON at [facility].

## 2023-04-29 ENCOUNTER — HEALTH MAINTENANCE LETTER (OUTPATIENT)
Age: 38
End: 2023-04-29

## 2023-08-08 ENCOUNTER — OFFICE VISIT (OUTPATIENT)
Dept: FAMILY MEDICINE | Facility: CLINIC | Age: 38
End: 2023-08-08
Payer: COMMERCIAL

## 2023-08-08 VITALS
HEART RATE: 60 BPM | OXYGEN SATURATION: 98 % | BODY MASS INDEX: 29.25 KG/M2 | TEMPERATURE: 98.1 F | DIASTOLIC BLOOD PRESSURE: 72 MMHG | SYSTOLIC BLOOD PRESSURE: 106 MMHG | HEIGHT: 66 IN | WEIGHT: 182 LBS | RESPIRATION RATE: 14 BRPM

## 2023-08-08 DIAGNOSIS — Z00.00 ROUTINE GENERAL MEDICAL EXAMINATION AT A HEALTH CARE FACILITY: Primary | ICD-10-CM

## 2023-08-08 PROBLEM — Z34.90 ENCOUNTER FOR INDUCTION OF LABOR: Status: RESOLVED | Noted: 2022-12-01 | Resolved: 2023-08-08

## 2023-08-08 PROCEDURE — 99385 PREV VISIT NEW AGE 18-39: CPT | Performed by: FAMILY MEDICINE

## 2023-08-08 SDOH — ECONOMIC STABILITY: TRANSPORTATION INSECURITY
IN THE PAST 12 MONTHS, HAS THE LACK OF TRANSPORTATION KEPT YOU FROM MEDICAL APPOINTMENTS OR FROM GETTING MEDICATIONS?: NO

## 2023-08-08 SDOH — ECONOMIC STABILITY: TRANSPORTATION INSECURITY
IN THE PAST 12 MONTHS, HAS LACK OF TRANSPORTATION KEPT YOU FROM MEETINGS, WORK, OR FROM GETTING THINGS NEEDED FOR DAILY LIVING?: NO

## 2023-08-08 SDOH — ECONOMIC STABILITY: INCOME INSECURITY: IN THE LAST 12 MONTHS, WAS THERE A TIME WHEN YOU WERE NOT ABLE TO PAY THE MORTGAGE OR RENT ON TIME?: NO

## 2023-08-08 SDOH — ECONOMIC STABILITY: INCOME INSECURITY: HOW HARD IS IT FOR YOU TO PAY FOR THE VERY BASICS LIKE FOOD, HOUSING, MEDICAL CARE, AND HEATING?: NOT VERY HARD

## 2023-08-08 SDOH — HEALTH STABILITY: PHYSICAL HEALTH: ON AVERAGE, HOW MANY MINUTES DO YOU ENGAGE IN EXERCISE AT THIS LEVEL?: 10 MIN

## 2023-08-08 SDOH — ECONOMIC STABILITY: FOOD INSECURITY: WITHIN THE PAST 12 MONTHS, YOU WORRIED THAT YOUR FOOD WOULD RUN OUT BEFORE YOU GOT MONEY TO BUY MORE.: NEVER TRUE

## 2023-08-08 SDOH — HEALTH STABILITY: PHYSICAL HEALTH: ON AVERAGE, HOW MANY DAYS PER WEEK DO YOU ENGAGE IN MODERATE TO STRENUOUS EXERCISE (LIKE A BRISK WALK)?: 1 DAY

## 2023-08-08 SDOH — ECONOMIC STABILITY: FOOD INSECURITY: WITHIN THE PAST 12 MONTHS, THE FOOD YOU BOUGHT JUST DIDN'T LAST AND YOU DIDN'T HAVE MONEY TO GET MORE.: NEVER TRUE

## 2023-08-08 ASSESSMENT — ENCOUNTER SYMPTOMS
DIZZINESS: 0
BREAST MASS: 0
COUGH: 0
FREQUENCY: 0
CONSTIPATION: 0
DYSURIA: 0
SORE THROAT: 0
SHORTNESS OF BREATH: 0
WEAKNESS: 0
JOINT SWELLING: 0
HEMATOCHEZIA: 0
PALPITATIONS: 0
NAUSEA: 0
DIARRHEA: 0
CHILLS: 0
MYALGIAS: 1
EYE PAIN: 0
ABDOMINAL PAIN: 0
HEARTBURN: 0
HEADACHES: 0
NERVOUS/ANXIOUS: 0
HEMATURIA: 0
PARESTHESIAS: 0
ARTHRALGIAS: 1

## 2023-08-08 ASSESSMENT — SOCIAL DETERMINANTS OF HEALTH (SDOH)
HOW OFTEN DO YOU ATTEND CHURCH OR RELIGIOUS SERVICES?: MORE THAN 4 TIMES PER YEAR
IN A TYPICAL WEEK, HOW MANY TIMES DO YOU TALK ON THE PHONE WITH FAMILY, FRIENDS, OR NEIGHBORS?: MORE THAN THREE TIMES A WEEK
HOW OFTEN DO YOU GET TOGETHER WITH FRIENDS OR RELATIVES?: MORE THAN THREE TIMES A WEEK
DO YOU BELONG TO ANY CLUBS OR ORGANIZATIONS SUCH AS CHURCH GROUPS UNIONS, FRATERNAL OR ATHLETIC GROUPS, OR SCHOOL GROUPS?: NO

## 2023-08-08 ASSESSMENT — LIFESTYLE VARIABLES
HOW MANY STANDARD DRINKS CONTAINING ALCOHOL DO YOU HAVE ON A TYPICAL DAY: PATIENT DOES NOT DRINK
HOW OFTEN DO YOU HAVE A DRINK CONTAINING ALCOHOL: NEVER
HOW OFTEN DO YOU HAVE SIX OR MORE DRINKS ON ONE OCCASION: NEVER
SKIP TO QUESTIONS 9-10: 1
AUDIT-C TOTAL SCORE: 0

## 2023-08-08 NOTE — PROGRESS NOTES
SUBJECTIVE:   CC: Mark is an 38 year old who presents for preventive health visit.     Healthy Habits:     Getting at least 3 servings of Calcium per day:  Yes    Bi-annual eye exam:  NO    Dental care twice a year:  Yes    Sleep apnea or symptoms of sleep apnea:  None    Diet:  Diabetic    Frequency of exercise:  2-3 days/week    Duration of exercise:  Less than 15 minutes    Taking medications regularly:  Not Applicable    Medication side effects:  Not applicable    Additional concerns today:  No      Today's PHQ-2 Score:       8/8/2023     9:32 AM   PHQ-2 ( 1999 [employer])   Q1: Little interest or pleasure in doing things 0   Q2: Feeling down, depressed or hopeless 0   PHQ-2 Score 0   Q1: Little interest or pleasure in doing things Not at all   Q2: Feeling down, depressed or hopeless Not at all   PHQ-2 Score 0     Consult IUD    Have you ever done Advance Care Planning? (For example, a Health Directive, POLST, or a discussion with a medical provider or your loved ones about your wishes): No, advance care planning information given to patient to review.  Patient plans to discuss their wishes with loved ones or provider.      Social History     Tobacco Use    Smoking status: Never    Smokeless tobacco: Never   Substance Use Topics    Alcohol use: No             8/8/2023     9:32 AM   Alcohol Use   Prescreen: >3 drinks/day or >7 drinks/week? Not Applicable     Reviewed orders with patient.  Reviewed health maintenance and updated orders accordingly - Yes  Patient Active Problem List   Diagnosis    Myopia    Regular astigmatism    Other iron deficiency anemia    History of gestational diabetes mellitus (GDM)    History of hypertension    HCV antibody positive - Hep C RNA neg x2     Past Surgical History:   Procedure Laterality Date    GYN SURGERY      ovarian cyst       Social History     Tobacco Use    Smoking status: Never    Smokeless tobacco: Never   Substance Use Topics    Alcohol use: No     Family History    Problem Relation Age of Onset    Eye Surgery No family hx of            Breast Cancer Screenin/8/2023     9:40 AM   Breast CA Risk Assessment (FHS-7)   Do you have a family history of breast, colon, or ovarian cancer? No / Unknown       click delete button to remove this line now  Patient under 40 years of age: Routine Mammogram Screening not recommended.   Pertinent mammograms are reviewed under the imaging tab.    History of abnormal Pap smear: NO - age 30-65 PAP every 5 years with negative HPV co-testing recommended      Latest Ref Rng & Units 2020     2:26 PM 2020     1:00 PM   PAP / HPV   PAP (Historical)  NIL     HPV 16 DNA NEG^Negative  Negative    HPV 18 DNA NEG^Negative  Negative    Other HR HPV NEG^Negative  Negative      Reviewed and updated as needed this visit by clinical staff   Tobacco  Allergies               Reviewed and updated as needed this visit by Provider                     Review of Systems   Constitutional:  Negative for chills.   HENT:  Negative for congestion, ear pain, hearing loss and sore throat.    Eyes:  Negative for pain and visual disturbance.   Respiratory:  Negative for cough and shortness of breath.    Cardiovascular:  Negative for chest pain, palpitations and peripheral edema.   Gastrointestinal:  Negative for abdominal pain, constipation, diarrhea, heartburn, hematochezia and nausea.   Breasts:  Negative for tenderness, breast mass and discharge.   Genitourinary:  Negative for dysuria, frequency, genital sores, hematuria, pelvic pain, urgency, vaginal bleeding and vaginal discharge.   Musculoskeletal:  Positive for arthralgias and myalgias. Negative for joint swelling.   Skin:  Negative for rash.   Neurological:  Negative for dizziness, weakness, headaches and paresthesias.   Psychiatric/Behavioral:  Negative for mood changes. The patient is not nervous/anxious.         OBJECTIVE:   /72 (BP Location: Right arm, Patient Position: Chair, Cuff  "Size: Adult Large)   Pulse 60   Temp 98.1  F (36.7  C) (Oral)   Resp 14   Ht 1.664 m (5' 5.5\")   Wt 82.6 kg (182 lb)   LMP 08/01/2023 (Approximate)   SpO2 98%   Breastfeeding No   BMI 29.83 kg/m    Physical Exam  GENERAL: healthy, alert and no distress  EYES: Eyes grossly normal to inspection, PERRL and conjunctivae and sclerae normal  HENT: ear canals and TM's normal, nose and mouth without ulcers or lesions  NECK: no adenopathy, no asymmetry, masses, or scars and thyroid normal to palpation  RESP: lungs clear to auscultation - no rales, rhonchi or wheezes  BREAST: normal without masses, tenderness or nipple discharge and no palpable axillary masses or adenopathy  CV: regular rate and rhythm, normal S1 S2, no S3 or S4, no murmur, click or rub, no peripheral edema and peripheral pulses strong  ABDOMEN: soft, nontender, no hepatosplenomegaly, no masses and bowel sounds normal  MS: no gross musculoskeletal defects noted, no edema  SKIN: no suspicious lesions or rashes  NEURO: Normal strength and tone, mentation intact and speech normal  PSYCH: mentation appears normal, affect normal/bright    Diagnostic Test Results:  Labs reviewed in Epic    ASSESSMENT/PLAN:     1. Routine general medical examination at a health care facility  - REVIEW OF HEALTH MAINTENANCE PROTOCOL ORDERS  - Hemoglobin A1c; Future  - CBC with platelets; Future  - Iron and iron binding capacity; Future  - Lipid panel reflex to direct LDL Fasting; Future      COUNSELING:  Reviewed preventive health counseling, as reflected in patient instructions      BMI:   Estimated body mass index is 29.83 kg/m  as calculated from the following:    Height as of this encounter: 1.664 m (5' 5.5\").    Weight as of this encounter: 82.6 kg (182 lb).       She reports that she has never smoked. She has never used smokeless tobacco.          Rola Segura MD  [facility]  "

## 2023-08-08 NOTE — PATIENT INSTRUCTIONS
Look into the Mirena for the 5 year hormonal IUD     Preventive Health Recommendations  Female Ages 26 - 39  Yearly exam:   See your health care provider every year in order to  Review health changes.   Discuss preventive care.    Review your medicines if you your doctor has prescribed any.    Until age 30: Get a Pap test every three years (more often if you have had an abnormal result).    After age 30: Talk to your doctor about whether you should have a Pap test every 3 years or have a Pap test with HPV screening every 5 years.   You do not need a Pap test if your uterus was removed (hysterectomy) and you have not had cancer.  You should be tested each year for STDs (sexually transmitted diseases), if you're at risk.   Talk to your provider about how often to have your cholesterol checked.  If you are at risk for diabetes, you should have a diabetes test (fasting glucose).  Shots: Get a flu shot each year. Get a tetanus shot every 10 years.   Nutrition:   Eat at least 5 servings of fruits and vegetables each day.  Eat whole-grain bread, whole-wheat pasta and brown rice instead of white grains and rice.  Get adequate Calcium and Vitamin D.     Lifestyle  Exercise at least 150 minutes a week (30 minutes a day, 5 days of the week). This will help you control your weight and prevent disease.  Limit alcohol to one drink per day.  No smoking.   Wear sunscreen to prevent skin cancer.  See your dentist every six months for an exam and cleaning.

## 2023-12-13 ENCOUNTER — HOSPITAL ENCOUNTER (EMERGENCY)
Facility: CLINIC | Age: 38
Discharge: HOME | End: 2023-12-14
Attending: EMERGENCY MEDICINE | Admitting: EMERGENCY MEDICINE
Payer: COMMERCIAL

## 2023-12-13 DIAGNOSIS — M79.671 RIGHT FOOT PAIN: ICD-10-CM

## 2023-12-13 PROCEDURE — 99283 EMERGENCY DEPT VISIT LOW MDM: CPT

## 2023-12-14 ENCOUNTER — APPOINTMENT (OUTPATIENT)
Dept: GENERAL RADIOLOGY | Facility: CLINIC | Age: 38
End: 2023-12-14
Attending: EMERGENCY MEDICINE
Payer: COMMERCIAL

## 2023-12-14 VITALS
OXYGEN SATURATION: 97 % | RESPIRATION RATE: 18 BRPM | DIASTOLIC BLOOD PRESSURE: 55 MMHG | TEMPERATURE: 98 F | HEART RATE: 74 BPM | SYSTOLIC BLOOD PRESSURE: 102 MMHG

## 2023-12-14 PROCEDURE — 73630 X-RAY EXAM OF FOOT: CPT | Mod: RT

## 2023-12-14 RX ORDER — OXYCODONE HYDROCHLORIDE 5 MG/1
5 TABLET ORAL ONCE
Status: COMPLETED | OUTPATIENT
Start: 2023-12-14 | End: 2023-12-14

## 2023-12-14 RX ORDER — IBUPROFEN 600 MG/1
600 TABLET, FILM COATED ORAL EVERY 6 HOURS PRN
Qty: 20 TABLET | Refills: 0 | Status: SHIPPED | OUTPATIENT
Start: 2023-12-14 | End: 2024-01-13

## 2023-12-14 RX ORDER — OXYCODONE HYDROCHLORIDE 5 MG/1
5 TABLET ORAL EVERY 6 HOURS PRN
Qty: 5 TABLET | Refills: 0 | Status: SHIPPED | OUTPATIENT
Start: 2023-12-14 | End: 2023-12-17

## 2023-12-14 ASSESSMENT — ACTIVITIES OF DAILY LIVING (ADL): ADLS_ACUITY_SCORE: 37

## 2023-12-14 NOTE — ED TRIAGE NOTES
Pt. Presents to ED with complaints of R foot pain that has been intermittent for a couple weeks. Reports it hurts on the dorsal and plantar aspects of her foot. Taking tylenol and ibuprofen with no relief. AVSS on RA. Denies injury to foot.      Triage Assessment (Adult)       Row Name 12/13/23 9064          Triage Assessment    Airway WDL WDL        Respiratory WDL    Respiratory WDL WDL        Skin Circulation/Temperature WDL    Skin Circulation/Temperature WDL WDL        Cardiac WDL    Cardiac WDL WDL        Peripheral/Neurovascular WDL    Peripheral Neurovascular WDL WDL        Cognitive/Neuro/Behavioral WDL    Cognitive/Neuro/Behavioral WDL WDL

## 2023-12-14 NOTE — ED PROVIDER NOTES
"    History     Chief Complaint:  Foot Pain       HPI   Mark Cervantes is a 38 year old female with a history of Diabetes mellitus, and Anemia who presents in the ED today due to right foot pain. The patient notes that the pain began at the end of October this year. She notes that her foot feels swollen at times and the pain is greatest in the sole of the foot.  Walking particularly up and down strirs exacerbates the pain. She notes that she has tried heating it, tylenol/motrin, compression socks and massages, but has seen minimal relief. The patient denies fever, chills, numbness, focal weakness or other symptoms. No known reported trauma. Presents to ED at this time as \"tired on the pain.\"      Independent Historian:    None- The Patient only     Review of External Notes:  10/31/23 OP visit - incontinence    Medications:    The patient is not currently taking any prescribed medications.    Past Medical History:    Diabetes mellitus   Tuberculosis  Anemia     Past Surgical History:    Ovarian cyst     Physical Exam   Patient Vitals for the past 24 hrs:   BP Temp Temp src Pulse Resp SpO2   12/14/23 0008 102/55 98  F (36.7  C) Oral 74 18 97 %   12/13/23 2241 122/76 -- -- 82 16 100 %   12/13/23 2240 -- 98  F (36.7  C) Temporal -- 16 --      Physical Exam  General: Resting comfortably   Head:  The scalp, face, and head appear normal  Eyes:  The pupils are normal    Conjunctivae and sclera appear normal  ENT:    The nose is normal  Neck:  Normal range of motion  MSK:  Moves all extremities equally  R. Foot Exam:  Inspection: no overlying warmth/erythema/swelling.   Palpation: TTP over the plantar surface. No ankle tenderness  Strength: Normal painless right hip and knee flex/ext. No pain with right ankle ROM actively and passively  Sensation: Intact to light touch distally all toes  Cap refill all toes:   < 2 seconds distally.   PT/DP Pulses  Normal left foot/ankle  Skin:  Nonblanching macular rash to soles " bilaterally, chronic per patient  Neuro:  Speech is normal and fluent  Psych: Awake. Alert.  Normal affect.                Emergency Department Course     Imaging:  Foot  XR, G/E 3 views, right   Final Result   IMPRESSION: Normal joint spaces and alignment. No fracture.        Report per radiology    Laboratory:  Labs Ordered and Resulted from Time of ED Arrival to Time of ED Departure - No data to display     Emergency Department Course & Assessments:    Interventions:  Medications - No data to display     Assessments:  0128  I obtained history and examined the patient as noted above. I explained findings to the patient and we discussed plan for discharge. The patient is comfortable with this plan.    Independent Interpretation (X-rays, CTs, rhythm strip):  None    Consultations/Discussion of Management or Tests:  None    Social Determinants of Health affecting care:  None     Disposition:  The patient was discharged to home.     Impression & Plan    CMS Diagnoses: None    Medical Decision Making:  Patient is a 38-year-old female presenting with predominantly complaints of atraumatic foot pain.  She is neurovascularly intact.  X-ray without focal fracture or dislocation.  No clinical evidence to suggest septic joint.  I did discuss with patient quite possible her pain is secondary to plantar fasciitis.  We did discuss recommendation for using a frozen juice can and rolling her foot to assist with pain control.  Ibuprofen/Tylenol recommended but will provide a few oxycodone for breakthrough..  I also will provide orthopedic referral should pain persist..  Return precautions given.    Diagnosis:    ICD-10-CM    1. Right foot pain  M79.671            Discharge Medications:  Discharge Medication List as of 12/14/2023  1:39 AM        START taking these medications    Details   !! ibuprofen (ADVIL/MOTRIN) 600 MG tablet Take 1 tablet (600 mg) by mouth every 6 hours as needed for moderate pain, Disp-20 tablet, R-0, Local  Print      oxyCODONE (ROXICODONE) 5 MG tablet Take 1 tablet (5 mg) by mouth every 6 hours as needed for pain, Disp-5 tablet, R-0, Local Print       !! - Potential duplicate medications found. Please discuss with provider.         Scribe Disclosure:    I, Shahid Combs, am serving as a scribe at 12:55 AM on 12/14/2023 to document services personally performed by Radha Blake DO based on my observations and the provider's statements to me.    12/14/2023   Radha Blake DO McDonald, Lindsey E, DO  12/14/23 0632

## 2024-07-06 ENCOUNTER — HEALTH MAINTENANCE LETTER (OUTPATIENT)
Age: 39
End: 2024-07-06

## 2024-08-23 ENCOUNTER — OFFICE VISIT (OUTPATIENT)
Dept: URGENT CARE | Facility: URGENT CARE | Age: 39
End: 2024-08-23
Payer: COMMERCIAL

## 2024-08-23 VITALS
RESPIRATION RATE: 17 BRPM | DIASTOLIC BLOOD PRESSURE: 77 MMHG | TEMPERATURE: 98.5 F | OXYGEN SATURATION: 97 % | SYSTOLIC BLOOD PRESSURE: 115 MMHG | BODY MASS INDEX: 29.57 KG/M2 | HEIGHT: 66 IN | WEIGHT: 184 LBS | HEART RATE: 79 BPM

## 2024-08-23 DIAGNOSIS — J31.0 CHRONIC RHINITIS: ICD-10-CM

## 2024-08-23 DIAGNOSIS — J02.0 STREP PHARYNGITIS: Primary | ICD-10-CM

## 2024-08-23 LAB — DEPRECATED S PYO AG THROAT QL EIA: POSITIVE

## 2024-08-23 PROCEDURE — 99203 OFFICE O/P NEW LOW 30 MIN: CPT | Performed by: INTERNAL MEDICINE

## 2024-08-23 PROCEDURE — 87880 STREP A ASSAY W/OPTIC: CPT

## 2024-08-23 PROCEDURE — 87635 SARS-COV-2 COVID-19 AMP PRB: CPT | Performed by: INTERNAL MEDICINE

## 2024-08-23 RX ORDER — AMOXICILLIN 500 MG/1
500 CAPSULE ORAL 2 TIMES DAILY
Qty: 20 CAPSULE | Refills: 0 | Status: SHIPPED | OUTPATIENT
Start: 2024-08-23 | End: 2024-09-02

## 2024-08-23 RX ORDER — FLUTICASONE PROPIONATE 50 MCG
1 SPRAY, SUSPENSION (ML) NASAL DAILY
Qty: 11 ML | Refills: 0 | Status: SHIPPED | OUTPATIENT
Start: 2024-08-23

## 2024-08-23 NOTE — PROGRESS NOTES
"SUBJECTIVE:  Chief complaint of headache, sore throat with onset today.  Some dry cough as well.  Children are ill with cough and sore throat. Has been noting loss of smell and taste.  Overall duration of symptoms with the cough and nasal congestion predates her sore throat over the past month.    ROS:  The following systems have been completely reviewed and are negative except as noted in the HPI: CONSTITUTIONAL, HEAD AND NECK, CARDIOVASCULAR, PULMONARY, and GASTROINTESTINAL    OBJECTIVE:  /77   Pulse 79   Temp 98.5  F (36.9  C) (Oral)   Resp 17   Ht 1.676 m (5' 6\")   Wt 83.5 kg (184 lb)   SpO2 97%   BMI 29.70 kg/m    GENERAL: healthy, alert and no distress  HENT: ear canals and TM's normal and cobblestoning of the posterior pharynx with post-nasal drainage   NECK: cervical adenopathy moderate, bilateral, anterior  RESP: clear to auscultation and percussion bilaterally; normal I:E ratio  CV: regular rates and rhythm, normal S1 S2, no S3 or S4 and no murmur, click or rub -     LAB:   Results for orders placed or performed in visit on 08/23/24   Streptococcus A Rapid Screen w/Reflex to PCR - Clinic Collect     Status: Abnormal    Specimen: Throat; Swab   Result Value Ref Range    Group A Strep antigen Positive (A) Negative      ASSESSMENT/PLAN:    ICD-10-CM    1. Strep pharyngitis  J02.0 Streptococcus A Rapid Screen w/Reflex to PCR - Clinic Collect     Symptomatic COVID-19 Virus (Coronavirus) by PCR Nasopharyngeal     amoxicillin (AMOXIL) 500 MG capsule      2. Chronic rhinitis  J31.0 fluticasone (FLONASE) 50 MCG/ACT nasal spray          James Magana MD   "

## 2024-08-24 LAB — SARS-COV-2 RNA RESP QL NAA+PROBE: NEGATIVE

## 2024-09-14 ENCOUNTER — HEALTH MAINTENANCE LETTER (OUTPATIENT)
Age: 39
End: 2024-09-14